# Patient Record
Sex: FEMALE | Race: OTHER | Employment: FULL TIME | URBAN - METROPOLITAN AREA
[De-identification: names, ages, dates, MRNs, and addresses within clinical notes are randomized per-mention and may not be internally consistent; named-entity substitution may affect disease eponyms.]

---

## 2020-09-22 ENCOUNTER — OFFICE VISIT (OUTPATIENT)
Dept: INTERNAL MEDICINE CLINIC | Age: 25
End: 2020-09-22

## 2020-09-22 VITALS
WEIGHT: 182.13 LBS | RESPIRATION RATE: 16 BRPM | TEMPERATURE: 98.2 F | HEIGHT: 61 IN | DIASTOLIC BLOOD PRESSURE: 78 MMHG | SYSTOLIC BLOOD PRESSURE: 122 MMHG | BODY MASS INDEX: 34.39 KG/M2 | HEART RATE: 64 BPM

## 2020-09-22 DIAGNOSIS — Z23 NEEDS FLU SHOT: ICD-10-CM

## 2020-09-22 DIAGNOSIS — R71.8 LOW MEAN CORPUSCULAR VOLUME (MCV): ICD-10-CM

## 2020-09-22 DIAGNOSIS — Z76.89 ENCOUNTER TO ESTABLISH CARE: ICD-10-CM

## 2020-09-22 DIAGNOSIS — N91.2: Primary | ICD-10-CM

## 2020-09-22 PROCEDURE — 99204 OFFICE O/P NEW MOD 45 MIN: CPT

## 2020-09-22 PROCEDURE — 90686 IIV4 VACC NO PRSV 0.5 ML IM: CPT

## 2020-09-22 NOTE — PROGRESS NOTES
12     [facility]  # [id number]    Chief Complaint   Patient presents with    New Patient     Patient fasting. Patient has concerns with not having menstrual cycle, reports taking birth control injections for the past 3 years.  Establish Care       1. Have you been to the ER, urgent care clinic since your last visit? Hospitalized since your last visit? No    2. Have you seen or consulted any other health care providers outside of the [address] [place] [address] since your last visit? Include any pap smears or colon screening. No    Health Maintenance Due   Topic Date Due    HPV Age 9Y-34Y (1 - 2-dose series) 09/16/2005    DTaP/Tdap/Td series (1 - Tdap) 09/16/2015    PAP AKA CERVICAL CYTOLOGY  09/16/2015    Flu Vaccine (1) 09/01/2020       Abuse Screening Questionnaire 9/22/2020   Do you ever feel afraid of your partner? N   Are you in a relationship with someone who physically or mentally threatens you? N   Is it safe for you to go home? Y       3 most recent PHQ Screens 9/22/2020   Little interest or pleasure in doing things Not at all   Feeling down, depressed, irritable, or hopeless More than half the days   Total Score PHQ 2 2       Learning Assessment 9/22/2020   PRIMARY LEARNER Patient   BARRIERS PRIMARY LEARNER LANGUAGE   PRIMARY LANGUAGE OTHER (COMMENT)   LEARNER PREFERENCE PRIMARY READING     VIDEOS   ANSWERED BY patient   RELATIONSHIP SELF     Immunization administered to left deltoid 9/22/2020 by Yamel Messina LPN with patient's consent. Patient tolerated procedure well. No reactions noted. VIS provided.

## 2020-09-22 NOTE — PROGRESS NOTES
History of Present Illness:   Reza Starr is a 31 y.o. female here for evaluation:    Speaks only Swahili. History, exam and education/communication with pt via [employer] #[id number] in [place] [address]. Chief Complaint   Patient presents with    New Patient     Patient fasting. Patient has concerns with not having menstrual cycle, reports taking birth control injections for the past 3 years.  Establish Care       Notes (nursing/rooming note copied below in italics):  As above. She notes concerns above about menstrual cycles. She had stopped birth control now. Has stopped for some time--clarified as 7mo  She had taken in order ot not be pregnant for travel/emigration. She notes she has not had menstrual cycle in the time since she stopped medication. She notes usually had on 15th of month, but has not had regularly or intermittent bleeding since stopped medication. Reviewed this can be seen with prolonged use of this medication. Reviewed eval with gyn. She has health dept records, and vaccines--abstracted after visit. She plans/prefers influenza here. Completed 3-dose Td/Tdap there  Completed MMR x 2 there. Completed Hep B x 3 there. Labs from health dept reviewed. They added VZV to labs, but not reported with available records. Nursing screenings reviewed by provider at visit. Past Medical History:   Diagnosis Date    Routine screening for STI (sexually transmitted infection) 03/16/2020    Health Dept labs: Hep B testing with immunity to hepatitis B (reactive sAb, with negative sAg and total core Ab); HIV non-reactive; T pallidum/syphilis non-reactive; HCV negative. GC/CT negative.  Screening for iron deficiency anemia 03/16/2020    Health Dept labs:  Hgb 11.9 with MCV 77. Remainder of H18 normal.  Chem 8 normal except CO2 18.  Screening for tuberculosis 03/16/2020    Health Dept labs:  T-spot negative. History reviewed.  No pertinent surgical history. Updated history as above at visit. Prior to Admission medications    Not on File        ROS  Complete ROS negative except as indicated in note. Vitals:    09/22/20 0943   BP: 122/78   Pulse: 64   Resp: 16   Temp: 98.2 °F (36.8 °C)   TempSrc: Oral   Weight: 182 lb 2 oz (82.6 kg)   Height: 5' 1.3\" (1.557 m)   PainSc:   0 - No pain      Body mass index is 34.08 kg/m². Physical Exam:     Physical Exam  Vitals signs and nursing note reviewed. Constitutional:       General: She is not in acute distress. Appearance: Normal appearance. She is well-developed. She is not diaphoretic. HENT:      Head: Normocephalic and atraumatic. Mouth/Throat:      Mouth: Mucous membranes are moist.   Eyes:      General: No scleral icterus. Right eye: No discharge. Left eye: No discharge. Conjunctiva/sclera: Conjunctivae normal.      Comments: Palpebral conjunctivae slightly pale. Neck:      Musculoskeletal: Neck supple. No neck rigidity. Cardiovascular:      Rate and Rhythm: Normal rate and regular rhythm. Pulses: Normal pulses. Heart sounds: Normal heart sounds. No murmur. No friction rub. No gallop. Pulmonary:      Effort: Pulmonary effort is normal. No respiratory distress. Breath sounds: Normal breath sounds. No stridor. No wheezing or rhonchi. Abdominal:      General: Bowel sounds are normal.      Palpations: Abdomen is soft. Tenderness: There is no abdominal tenderness. Musculoskeletal:         General: No deformity or signs of injury. Right lower leg: No edema. Left lower leg: No edema. Lymphadenopathy:      Cervical: No cervical adenopathy. Skin:     General: Skin is warm. Coloration: Skin is not jaundiced or pale. Findings: No bruising, erythema or rash. Neurological:      General: No focal deficit present. Mental Status: She is alert. Motor: No abnormal muscle tone.       Coordination: Coordination normal. Gait: Gait normal.   Psychiatric:         Mood and Affect: Mood normal.         Behavior: Behavior normal.         Thought Content: Thought content normal.         Judgment: Judgment normal.         Assessment and Plan:       ICD-10-CM ICD-9-CM    1. Amenorrhea due to medroxyprogesterone  N91.2 626.0 REFERRAL TO OBSTETRICS AND GYNECOLOGY     E980.4    2. Encounter to establish care  Z76.89 V65.8    3. Low mean corpuscular volume (MCV)  R71.8 790.09 IRON PROFILE      FERRITIN      HEMOGLOBIN FRACTIONATION   4. Needs flu shot  Z23 V04.81 INFLUENZA VIRUS VAC QUAD,SPLIT,PRESV FREE SYRINGE IM       1. Referral(s) and referral coordination reviewed with patient at visit. 3.  Not able to do labs today due to childcare concerns--needs to get back home to help kids with school. Prefers to schedule here. 4.  Immunizations reviewed and updated at visit. Follow-up and Dispositions    · Return in about 6 weeks (around 11/3/2020) for referral follow-up, lab review. lab results and schedule of future lab studies reviewed with patient  reviewed diet, exercise and weight control  reviewed medications and side effects in detail    For additional documentation of information and/or recommendations discussed this visit, please see notes in instructions. Plan and evaluation (above) reviewed with pt at visit  Patient voiced understanding of plan and provided with time to ask/review questions. After Visit Summary (AVS) provided to pt after visit with additional instructions as needed/reviewed. No future appointments.

## 2020-09-22 NOTE — PATIENT INSTRUCTIONS
Please follow the following instructions to process/authorize your referral, if needed:    Referrals processing  Please verify with your insurance IF you need referral authorization submitted. For insurance plans which require this, please follow the following steps. FAILURE TO DO SO MAY RESULT IN INABILITY TO SEE THE SPECIALIST YOU HAVE BEEN REFERRED TO (once you are scheduled to see them). 1. Call and schedule appointment with specialist  2. Call our clinic and leave message with provider name, and date of appointment  3. We will then submit the referral to your insurance. This process takes 2-5 business days. If you have questions about scheduling or authorizing referral, you can review with our referral coordinator. You can review with her today if available/if you have time, or you can call to review once you have made your referral/appointment. If you are not sure if you need referral authorizations, please review with the referral coordinator(s), either prior to or after you have made the appointment, as reviewed.

## 2021-06-28 ENCOUNTER — INITIAL PRENATAL (OUTPATIENT)
Dept: OBGYN CLINIC | Age: 26
End: 2021-06-28

## 2021-06-28 VITALS — DIASTOLIC BLOOD PRESSURE: 66 MMHG | SYSTOLIC BLOOD PRESSURE: 118 MMHG | WEIGHT: 170.6 LBS

## 2021-06-28 DIAGNOSIS — Z3A.24 24 WEEKS GESTATION OF PREGNANCY: ICD-10-CM

## 2021-06-28 DIAGNOSIS — Z34.80 SUPERVISION OF OTHER NORMAL PREGNANCY: Primary | ICD-10-CM

## 2021-06-28 LAB
ANTIBODY SCREEN, EXTERNAL: NEGATIVE
HBSAG, EXTERNAL: NEGATIVE
HIV, EXTERNAL: NON REACTIVE
RPR, EXTERNAL: NON REACTIVE
TYPE, ABO & RH, EXTERNAL: NORMAL
TYPE, ABO & RH, EXTERNAL: NORMAL

## 2021-06-28 PROCEDURE — 0500F INITIAL PRENATAL CARE VISIT: CPT | Performed by: ADVANCED PRACTICE MIDWIFE

## 2021-06-28 NOTE — PROGRESS NOTES
Current pregnancy history:    Bonifacio Burr is a No obstetric history on file. ,  31 y.o. female OTHER No LMP recorded. .  She presents for the evaluation of amenorrhea and a positive pregnancy test.    LMP history:  The date of her LMP is not certain. Her last menstrual period was normal and lasted for 4 to 5 days. A urine pregnancy test was positive 8 weeks ago. She was not on the pill at conception. Ultrasound data:  She had an  ultrasound done by the ultrasound tech today which revealed a viable nava pregnancy with a gestational age of 23 weeks and 0 days giving an [facility] of 10/18/2021. Feeling movement for past month or so  Pregnancy symptoms:    Since her LMP she has experienced  urinary frequency, breast tenderness, and nausea. She has not been vomiting over the last few weeks. Associated signs and symptoms which she denies: dysuria, discharge, vaginal bleeding. She states she has gained weight:  Approximately 5 pounds over the last few weeks. Relevant past pregnancy history:   She has the following pregnancy history: Her last pregnancy was uncomplicated. She has no history of  delivery. Relevant past medical history:(relevant to this pregnancy): noncontributory. Substance history: negative for alcohol, tobacco and street drugs. Positive for nothing. Exposure history: There is/are no indoor cat/s in the home. The patient was instructed to not change the cat litter. She admits close contact with children on a regular basis. She has had chicken pox or the vaccine in the past.   Patient denies issues with domestic violence. Genetic Screening/Teratology Counseling: (Includes patient, baby's father, or anyone in either family with:)  3.  Patient's age >/= 28 at [facility]?-- no  .   2.   Thalassemia ([facility], [place], [address], or  background): MCV<80?--no.     3.  Neural tube defect (meningomyelocele, spina bifida, anencephaly)?--no.   4. Congenital heart defect?--no.  5.  Down syndrome?--no.   6.  Darnell-Sachs ([place], [place])?--no.   7.  Canavan's Disease?--no.   8.  Familial Dysautonomia?--no.   9.  Sickle cell disease or trait ()? --no   The patient has not been tested for sickle trait  10. Hemophilia or other blood disorders?--no. 11.  Muscular dystrophy?--no. 12.  Cystic fibrosis?--no. 13.  [place]'s Chorea?--no. 14.  Mental retardation/autism (if yes was person tested for Fragile X)?--no. 15.  Other inherited genetic or chromosomal disorder?--no. 12.  Maternal metabolic disorder (DM, PKU, etc)?--no. 17.  Patient or FOB with a child with a birth defect not listed above?--no.  17a. Patient or FOB with a birth defect themselves?--no. 18.  Recurrent pregnancy loss, or stillbirth?--no. 19.  Any medications since LMP other than prenatal vitamins (include vitamins, supplements, OTC meds, drugs, alcohol)?--no. 20.  Any other genetic/environmental exposure to discuss?--no. Infection History:  1. Lives with someone with TB or TB exposed?--no.   2.  Patient or partner has history of genital herpes?--no.  3.  Rash or viral illness since LMP?--no.    4.  History of STD (GC, CT, HPV, syphilis, HIV)? --no   5. Other: OTHER? No past medical history on file. No past surgical history on file. Social History     Occupational History    Not on file   Tobacco Use    Smoking status: Not on file   Substance and Sexual Activity    Alcohol use: Not on file    Drug use: Not on file    Sexual activity: Not on file     No family history on file. OB History   No obstetric history on file.      Not on File  Prior to Admission medications    Not on File        Review of Systems: History obtained from the patient  Constitutional: negative for weight loss, fever, night sweats  HEENT: negative for hearing loss, earache, congestion, snoring, sore throat  CV: negative for chest pain, palpitations, edema  Resp: negative for cough, shortness of breath, wheezing  Breast: negative for breast lumps, nipple discharge, galactorrhea  GI: negative for change in bowel habits, abdominal pain, black or bloody stools  : negative for frequency, dysuria, hematuria, vaginal discharge  MSK: negative for back pain, joint pain, muscle pain  Skin: negative for itching, rash, hives  Neuro: negative for dizziness, headache, confusion, weakness  Psych: negative for anxiety, depression, change in mood  Heme/lymph: negative for bleeding, bruising, pallor    Objective: There were no vitals taken for this visit. Physical Exam:     Constitutional  · Appearance: well-nourished, well developed, alert, in no acute distress    HENT  · Head  · Face: appears normal  · Eyes: appear normal  · Ears: normal  · Mouth: normal  · Lips: no lesions    Neck  · Inspection/Palpation: normal appearance,    Chest  · Respiratory Effort: breathing unlabored  · nt      ·     Skin  · General Inspection: no rash, no lesions identified    Neurologic/Psychiatric  · Mental Status:   · Orientation: grossly oriented to person, place and time  · Mood and Affect: mood normal, affect appropriate    Assessment:   Intrauterine pregnancy with the following problems identified: Marginal cord insertion. Plan:   NOB labs done today  MECCA 4 wks, Glucola and Growth scan  Start PNV  Offered CF testing, CVS, Nuchal Translucency, MSAFP, amnio, and discussed NIPT  Course of pregnancy discussed including visit schedule, routine U/S, glucola testing, etc.  Avoid alcoholic beverages and illicit/recreational drugs use  Take prenatal vitamins or folic acid daily. Hospital and practice style discussed with coverage system. Discussed nutrition, toxoplasmosis precautions, sexual activity, exercise, need for influenza vaccine, environmental and work hazards, travel advice, screen for domestic violence, need for seat belts.   Discussed seafood, unpasteurized dairy products, deli meat, artificial sweeteners, and caffeine. Information on prenatal classes/breastfeeding given. Information on circumcision given  Patient encouraged not to smoke. Discussed current prescription drug use. Given medication list.  Discussed the use of over the counter medications and chemicals. Route of delivery discussed, including risks, benefits, and alternatives of  versus repeat LTCS. Pt understands risk of hemorrhage during pregnancy and post delivery and would accept blood products if necessary in life-threatening emergencies    Handouts given to pt. Zac Birmingham.  JULIETH Landry/RUDDY

## 2021-06-29 LAB
ABO + RH BLD: NORMAL
BLOOD BANK CMNT PATIENT-IMP: NORMAL
BLOOD GROUP ANTIBODIES SERPL: NORMAL
ERYTHROCYTE [DISTWIDTH] IN BLOOD BY AUTOMATED COUNT: 13.5 % (ref 11.5–14.5)
HBV SURFACE AG SER QL: <0.1 INDEX
HBV SURFACE AG SER QL: NEGATIVE
HCT VFR BLD AUTO: 34.7 % (ref 35–47)
HCV AB SERPL QL IA: NONREACTIVE
HCV COMMENT,HCGAC: NORMAL
HGB BLD-MCNC: 10.5 G/DL (ref 11.5–16)
HIV 1+2 AB+HIV1 P24 AG SERPL QL IA: NONREACTIVE
HIV12 RESULT COMMENT, HHIVC: NORMAL
MCH RBC QN AUTO: 24.2 PG (ref 26–34)
MCHC RBC AUTO-ENTMCNC: 30.3 G/DL (ref 30–36.5)
MCV RBC AUTO: 80.1 FL (ref 80–99)
NRBC # BLD: 0 K/UL (ref 0–0.01)
NRBC BLD-RTO: 0 PER 100 WBC
PLATELET # BLD AUTO: 151 K/UL (ref 150–400)
PMV BLD AUTO: 12.5 FL (ref 8.9–12.9)
RBC # BLD AUTO: 4.33 M/UL (ref 3.8–5.2)
RPR SER QL: NONREACTIVE
RUBV IGG SER-IMP: REACTIVE
RUBV IGG SERPL IA-ACNC: 105.4 IU/ML
SPECIMEN EXP DATE BLD: NORMAL
WBC # BLD AUTO: 4 K/UL (ref 3.6–11)

## 2021-07-23 NOTE — PATIENT INSTRUCTIONS
Weeks 26 to 30 of Your Pregnancy: Care Instructions  Overview     You are now entering your last trimester of pregnancy. Your baby is growing quickly. [facility] [employer] probably feel your baby moving around more often. Your doctor may ask you to count your baby's kicks. Your back may ache as your body gets used to your baby's size and length. If you haven't already had the Tdap shot during this pregnancy, talk to your doctor about getting it. It will help protect your  against pertussis infection. During this time, it's important to take care of yourself and pay attention to what your body needs. If you feel sexual, you can explore ways to be close with your partner that match your comfort and desire. Follow-up care is a key part of your treatment and safety. Be sure to make and go to all appointments, and call your doctor if you are having problems. It's also a good idea to know your test results and keep a list of the medicines you take. How can you care for yourself at home? Take it easy at work  · Take frequent breaks. If possible, stop working when you are tired, and rest during your lunch hour. · Take bathroom breaks every 2 hours. · Change positions often. If you sit for long periods, stand up and walk around. · When you stand for a long time, keep one foot on a low stool with your knee bent. After standing a lot, sit with your feet up. · Avoid fumes, chemicals, and tobacco smoke. Be sexual in your own way  · Having sex during pregnancy is okay, unless your doctor tells you not to. · You may be very interested in sex, or you may have no interest at all. · Your growing belly can make it hard to find a good position during intercourse. Barron and explore. · You may get cramps in your uterus when your partner touches your breasts. · A back rub may relieve the backache or cramps that sometimes follow orgasm. Learn about  labor  · Watch for signs of  labor.  You may be going into labor if:  ? You have menstrual-like cramps, with or without nausea. ? You have about 6 or more contractions in 1 hour, even after you have had a glass of water and are resting. ? You have a low, dull backache that does not go away when you change your position. ? You have pain or pressure in your pelvis that comes and goes in a pattern. ? You have intestinal cramping or flu-like symptoms, with or without diarrhea.  ? You notice an increase or change in your vaginal discharge. Discharge may be heavy, mucus-like, watery, or streaked with blood. ? Your water breaks. · If you think you have  labor:  ? Drink 2 or 3 glasses of water or juice. Not drinking enough fluids can cause contractions. ? Stop what you are doing, and empty your bladder. Then lie down on your left side for at least 1 hour. ? While lying on your side, find your breast bone. Put your fingers in the soft spot just below it. Move your fingers down toward your belly button to find the top of your uterus. Check to see if it is tight. ? Contractions can be weak or strong. Record your contractions for an hour. Time a contraction from the start of one contraction to the start of the next one.  ? Single or several strong contractions without a pattern are called Jaime-Tom contractions. They are practice contractions but not the start of labor. They often stop if you change what you are doing. ? Call your doctor if you have regular contractions. Where can you learn more? Go to http://www.gray.com/  Enter C759 in the search box to learn more about \"Weeks 26 to 30 of Your Pregnancy: Care Instructions. \"  Current as of: 2020               Content Version: 12.8  © 7386-2047 [employer]. Care instructions adapted under license by [employer] (which disclaims liability or warranty for this information).  If you have questions about a medical condition or this instruction, always ask your healthcare professional. [place] [address] any warranty or liability for your use of this information.

## 2021-07-26 ENCOUNTER — ROUTINE PRENATAL (OUTPATIENT)
Dept: OBGYN CLINIC | Age: 26
End: 2021-07-26
Payer: MEDICAID

## 2021-07-26 VITALS
SYSTOLIC BLOOD PRESSURE: 98 MMHG | WEIGHT: 173.8 LBS | HEIGHT: 61 IN | BODY MASS INDEX: 32.81 KG/M2 | DIASTOLIC BLOOD PRESSURE: 72 MMHG

## 2021-07-26 DIAGNOSIS — Z3A.24 24 WEEKS GESTATION OF PREGNANCY: ICD-10-CM

## 2021-07-26 DIAGNOSIS — Z3A.28 28 WEEKS GESTATION OF PREGNANCY: ICD-10-CM

## 2021-07-26 DIAGNOSIS — Z34.82 PRENATAL CARE, SUBSEQUENT PREGNANCY IN SECOND TRIMESTER: Primary | ICD-10-CM

## 2021-07-26 LAB
ERYTHROCYTE [DISTWIDTH] IN BLOOD BY AUTOMATED COUNT: 13.2 % (ref 11.5–14.5)
GLUCOSE 1H P 100 G GLC PO SERPL-MCNC: 114 MG/DL (ref 65–140)
HCT VFR BLD AUTO: 33.5 % (ref 35–47)
HGB BLD-MCNC: 10.3 G/DL (ref 11.5–16)
MCH RBC QN AUTO: 24.5 PG (ref 26–34)
MCHC RBC AUTO-ENTMCNC: 30.7 G/DL (ref 30–36.5)
MCV RBC AUTO: 79.8 FL (ref 80–99)
NRBC # BLD: 0 K/UL (ref 0–0.01)
NRBC BLD-RTO: 0 PER 100 WBC
PLATELET # BLD AUTO: 156 K/UL (ref 150–400)
PMV BLD AUTO: 12.4 FL (ref 8.9–12.9)
RBC # BLD AUTO: 4.2 M/UL (ref 3.8–5.2)
WBC # BLD AUTO: 5.3 K/UL (ref 3.6–11)

## 2021-07-26 PROCEDURE — 0502F SUBSEQUENT PRENATAL CARE: CPT | Performed by: ADVANCED PRACTICE MIDWIFE

## 2021-07-26 NOTE — PROGRESS NOTES
Pt c/o intermittent dizziness for the past several days. States she is drinking plenty of water. Not currently experiencing sxs. Pt not taking prenatal vitamin - states in addition to being dizzy she c/o of feeling weak as well.  Prenatal vitamin sent in today and encouraged to take daily- new ob labs hgb 10.5 -   GTT and labs today    MECCA 2 weeks

## 2021-07-27 RX ORDER — CYANOCOBALAMIN (VITAMIN B-12) 1000 MCG
1 TABLET, EXTENDED RELEASE ORAL DAILY
Qty: 30 TABLET | Refills: 3 | Status: SHIPPED | OUTPATIENT
Start: 2021-07-27 | End: 2021-10-27

## 2021-08-10 ENCOUNTER — ROUTINE PRENATAL (OUTPATIENT)
Dept: OBGYN CLINIC | Age: 26
End: 2021-08-10
Payer: MEDICAID

## 2021-08-10 VITALS — SYSTOLIC BLOOD PRESSURE: 94 MMHG | DIASTOLIC BLOOD PRESSURE: 68 MMHG | WEIGHT: 172 LBS | BODY MASS INDEX: 32.18 KG/M2

## 2021-08-10 DIAGNOSIS — Z3A.24 24 WEEKS GESTATION OF PREGNANCY: ICD-10-CM

## 2021-08-10 DIAGNOSIS — Z23 ENCOUNTER FOR IMMUNIZATION: ICD-10-CM

## 2021-08-10 DIAGNOSIS — Z36.9 UNSPECIFIED ANTENATAL SCREENING: Primary | ICD-10-CM

## 2021-08-10 PROCEDURE — 90471 IMMUNIZATION ADMIN: CPT | Performed by: ADVANCED PRACTICE MIDWIFE

## 2021-08-10 PROCEDURE — 90715 TDAP VACCINE 7 YRS/> IM: CPT

## 2021-08-10 PROCEDURE — 0502F SUBSEQUENT PRENATAL CARE: CPT | Performed by: ADVANCED PRACTICE MIDWIFE

## 2021-08-10 NOTE — LETTER
8/10/2021 9:03 AM    Ms. Barron Fossa  [address] [address]      To whom it may concern,    [place] Vallezafarotis is an established patient in my office with a pregnancy due date of 10/18/2021.            Sincerely,      Melinda Minaya CNM

## 2021-08-10 NOTE — PROGRESS NOTES
Needs prescription for PNV and iron- paper script given per pt request  Dizzy and weak episodes- reviewed hypotension- and drinking water through out the day.    MECCA 2 weeks

## 2021-08-10 NOTE — PROGRESS NOTES
Patient in office for TDAP injection, office supplied. After obtaining consent, and per orders of MD, injection of TDAP vaccine given by [facility], RN. Patient instructed to remain in clinic for 20 minutes afterwards, and to report any adverse reaction to me immediately.      Lot [id number]  Exp 5/7/23  Dose 0.5 ml  Site L deltoid  Route IM   [address] [id number]

## 2021-08-10 NOTE — PATIENT INSTRUCTIONS
Weeks 30 to 32 of Your Pregnancy: Care Instructions  Overview     You've made it to the final months of your pregnancy! By now your baby is really starting to look like a baby, with hair and plump skin. As you enter the final weeks of pregnancy, the reality of having a baby may start to set in. This is a good time to set up a safe nursery and find quality  if needed. Doing this stuff ahead of time will allow you to focus on caring for and enjoying your new baby. You may also want to take a tour of your hospital's labor and delivery unit. This will help you get a better idea of what to expect while you're in the hospital.  During these last months, be sure to take good care of yourself. Pay attention to what your body needs. If your doctor says it's okay for you to work, don't push yourself too hard. If you haven't already had the Tdap shot during this pregnancy, talk to your doctor about getting it. It will help protect your  against pertussis infection. Follow-up care is a key part of your treatment and safety. Be sure to make and go to all appointments, and call your doctor if you are having problems. It's also a good idea to know your test results and keep a list of the medicines you take. How can you care for yourself at home? Pay attention to your baby's movements  · You should feel your baby move several times every day. · Your baby now turns less, and kicks and jabs more. · Your baby sleeps 20 to 45 minutes at a time and is more active at certain times of day. · If your doctor wants you to count your baby's kicks:  ? Empty your bladder, and lie on your side or relax in a comfortable chair. ? Write down your start time. ? Pay attention only to your baby's movements. Count any movement except hiccups. ? After you have counted 10 movements, write down your stop time. ? Write down how many minutes it took for your baby to move 10 times.   ? If an hour goes by and you have not recorded 10 movements, have something to eat or drink and then count for another hour. If you do not record 10 movements in either hour, call your doctor. Ease heartburn  · Eat small, frequent meals. · Do not eat chocolate, peppermint, or very spicy foods. Avoid drinks with caffeine, such as coffee, tea, and sodas. · Avoid bending over or lying down after meals. · Talk a short walk after you eat. · If heartburn is a problem at night, do not eat for 2 hours before bedtime. · Take antacids like Mylanta, Maalox, Rolaids, or Tums. Do not take antacids that have sodium bicarbonate. Care for varicose veins  · Varicose veins are blood vessels that stretch out with the extra blood during pregnancy. Your legs may ache or throb. Most varicose veins will go away after the birth. · Avoid standing for long periods of time. Sit with your legs crossed at the ankles, not the knees. · Sit with your feet propped up. · Avoid tight clothing or stockings. Wear support hose. · Exercise regularly. Try walking for at least 30 minutes a day. Where can you learn more? Go to http://www.gray.com/  Enter X471 in the search box to learn more about \"Weeks 30 to 32 of Your Pregnancy: Care Instructions. \"  Current as of: October 8, 2020               Content Version: 12.8  © 6911-0873 [employer]. Care instructions adapted under license by [employer] (which disclaims liability or warranty for this information). If you have questions about a medical condition or this instruction, always ask your healthcare professional. [place] [address] any warranty or liability for your use of this information.

## 2021-08-26 ENCOUNTER — ROUTINE PRENATAL (OUTPATIENT)
Dept: OBGYN CLINIC | Age: 26
End: 2021-08-26
Payer: MEDICAID

## 2021-08-26 VITALS — BODY MASS INDEX: 32.56 KG/M2 | SYSTOLIC BLOOD PRESSURE: 120 MMHG | DIASTOLIC BLOOD PRESSURE: 76 MMHG | WEIGHT: 174 LBS

## 2021-08-26 DIAGNOSIS — Z3A.32 32 WEEKS GESTATION OF PREGNANCY: ICD-10-CM

## 2021-08-26 DIAGNOSIS — Z34.83 PRENATAL CARE, SUBSEQUENT PREGNANCY IN THIRD TRIMESTER: Primary | ICD-10-CM

## 2021-08-26 PROCEDURE — 0502F SUBSEQUENT PRENATAL CARE: CPT | Performed by: ADVANCED PRACTICE MIDWIFE

## 2021-08-26 NOTE — PROGRESS NOTES
Doing well.  Good FM  Denies questions or concerns   MECCA 2 weeks - growth scan for marginal cord DISPLAY PLAN FREE TEXT

## 2021-09-14 ENCOUNTER — ROUTINE PRENATAL (OUTPATIENT)
Dept: OBGYN CLINIC | Age: 26
End: 2021-09-14

## 2021-09-14 VITALS — WEIGHT: 177 LBS | SYSTOLIC BLOOD PRESSURE: 122 MMHG | BODY MASS INDEX: 33.12 KG/M2 | DIASTOLIC BLOOD PRESSURE: 88 MMHG

## 2021-09-14 DIAGNOSIS — Z3A.24 24 WEEKS GESTATION OF PREGNANCY: ICD-10-CM

## 2021-09-14 DIAGNOSIS — Z34.83 PRENATAL CARE, SUBSEQUENT PREGNANCY IN THIRD TRIMESTER: Primary | ICD-10-CM

## 2021-09-14 DIAGNOSIS — O36.5990 POOR FETAL GROWTH AFFECTING MANAGEMENT OF MOTHER, ANTEPARTUM, SINGLE OR UNSPECIFIED FETUS: ICD-10-CM

## 2021-09-14 PROCEDURE — 0502F SUBSEQUENT PRENATAL CARE: CPT | Performed by: ADVANCED PRACTICE MIDWIFE

## 2021-09-14 NOTE — PROGRESS NOTES
Junaid  utilized    Pt is very quiet today - states she has no appetite and does not drink much water-   encouraged pt to eat and drink   IUGR baby- BPP next week -   Will call for MFM apt for dopplers and recs for delivery if prior to 39 weeks  - reviewed with Daphane Kehr- in agreement     LIMITED OB SCAN  A SINGLE VERTEX 35W1D IUP IS SEEN. FETAL CARDIAC MOTION OBSERVED. LIMITED ANATOMY WAS VISUALIZED AND APPEARS WNL. EFW= 4 LB 9OZ ( 12 %) AC< 10%. BPP=8/8  JONAH= 9.0 CM  PLACENTA APPEAR WITHIN NORMAL LIMITS.

## 2021-09-24 ENCOUNTER — TELEPHONE (OUTPATIENT)
Dept: OBGYN CLINIC | Age: 26
End: 2021-09-24

## 2021-09-24 NOTE — TELEPHONE ENCOUNTER
#[id number] utilized. Spoke at great length with pts SO, Yuridia Amina, regarding the need for pt to come in to office on 9/24/21 to be seen. She missed her appt at [facility] on 9/20/21. She had stated she could not get a ride to the appointment and that she has difficulty with getting rides. Leola Brad \"Sometimes they show up and sometimes they don't\". Given recommendations for options for rides such as bus, Medicaid transportation, [employer], friends, [employer] and was met with resistance. Discussed the importance due to baby being very small and needs to be followed closely. The midwife was present during this call and also suggested that any time that she could get here to the office on 9/24/21 we could work with that and we will find a way to get her worked in for [address] in the office that same day. He stated we will inform her and try to make sure she comes in.

## 2021-09-27 ENCOUNTER — ROUTINE PRENATAL (OUTPATIENT)
Dept: OBGYN CLINIC | Age: 26
End: 2021-09-27
Payer: MEDICAID

## 2021-09-27 VITALS — SYSTOLIC BLOOD PRESSURE: 112 MMHG | DIASTOLIC BLOOD PRESSURE: 74 MMHG | BODY MASS INDEX: 32.74 KG/M2 | WEIGHT: 175 LBS

## 2021-09-27 DIAGNOSIS — Z36.85 ANTENATAL SCREENING FOR STREPTOCOCCUS B: Primary | ICD-10-CM

## 2021-09-27 DIAGNOSIS — O36.5930 POOR FETAL GROWTH AFFECTING MANAGEMENT OF MOTHER IN THIRD TRIMESTER, SINGLE OR UNSPECIFIED FETUS: ICD-10-CM

## 2021-09-27 DIAGNOSIS — Z3A.37 37 WEEKS GESTATION OF PREGNANCY: ICD-10-CM

## 2021-09-27 DIAGNOSIS — Z34.83 PRENATAL CARE, SUBSEQUENT PREGNANCY IN THIRD TRIMESTER: ICD-10-CM

## 2021-09-27 LAB — GRBS, EXTERNAL: NEGATIVE

## 2021-09-27 PROCEDURE — 0502F SUBSEQUENT PRENATAL CARE: CPT | Performed by: ADVANCED PRACTICE MIDWIFE

## 2021-09-27 NOTE — PROGRESS NOTES
Junaid  utilized    Pt states she is feeling well and can feel baby move. Had [address] today:  Reviewed sono results with Dr. Zhang Melissa and recommendations for delivery  Discussed with Pt utilizing the  phone and acknowledges understanding, however disagrees with plan and is currently declining IOL  She would like to further discuss with her spouse, who is currently at work and cannot come to appt, the recommendations for IOL/delivery  I reviewed with her the complication of the baby not growing and this could lead to IUFD, which she verbalized understanding  She and her  have issues with transportation. She promises she will RTO tomorrow with her  so that we can further discuss recommended management of IOL for IUGR. I will accept her anytime tomorrow even if she cannot make the scheduled time d/t transportation. Informed Dr. Zhang Melissa of pt's refusal of IOL today and pt will RTO tomorrow  GBS collected  SVE unfavorable FT/Thick/Firm/HIgh    LIMITED OB SCAN  A SINGLE 37W0D IUP IS SEEN. FETAL CARDIAC MOTION OBSERVED. LIMITED ANATOMY WAS VISUALIZED AND APPEARS WNL. EFW- 5 LBS 0 OZ ( <10%) (AC<10%)  JONAH= 10.69 CM  BPP= 8/8  PLACENTA APPEAR WNL.

## 2021-09-28 ENCOUNTER — ROUTINE PRENATAL (OUTPATIENT)
Dept: OBGYN CLINIC | Age: 26
End: 2021-09-28
Payer: MEDICAID

## 2021-09-28 VITALS — SYSTOLIC BLOOD PRESSURE: 130 MMHG | BODY MASS INDEX: 33.49 KG/M2 | WEIGHT: 179 LBS | DIASTOLIC BLOOD PRESSURE: 88 MMHG

## 2021-09-28 DIAGNOSIS — Z3A.37 37 WEEKS GESTATION OF PREGNANCY: ICD-10-CM

## 2021-09-28 DIAGNOSIS — O36.5990 IUGR, ANTENATAL: Primary | ICD-10-CM

## 2021-09-28 DIAGNOSIS — Z34.83 PRENATAL CARE, SUBSEQUENT PREGNANCY IN THIRD TRIMESTER: ICD-10-CM

## 2021-09-28 PROCEDURE — 0502F SUBSEQUENT PRENATAL CARE: CPT | Performed by: ADVANCED PRACTICE MIDWIFE

## 2021-09-28 NOTE — PROGRESS NOTES
Here to discuss delivery of baby with  present. Feeling baby move. Through  phone, pt and  feel strongly that the reason for the baby being IUGR is her working. She did not work in [place], but does here in [facility] and she has \"no rest, doesn't eat well and is not happy\"  Denies depression, but feels her happiness is tied to the growth and happiness of the baby  Desires note to be out of work and does consent to IOL Monday. Feels like she needs to rest prior to having the energy to go through labor and birth. Plan for NST today, if Reactive, RTO on Friday for repeat BPP and will come in Monday for IOL. NST procedure note    Bonifacio Burr is a ,  31 y.o. female BLACK/ [address] [place] [address] whose LMP  was on  who presents for fetal non-stress test.    She is 37w1d and was monitored for 22 minutes and the FHR was reactive, with accelerations. NST Interpretation:    FHR baseline 130 bpm, variability moderate, accelerations present, decelerations Absent. Uterine contractions were present x 1, no decel    Bonifacio was informed of the NST results and her questions were answered. Disposition:    - return to clinic as scheduled on Friday for BPP/NST    JULIETH Conklin/RUDDY

## 2021-09-28 NOTE — LETTER
9/28/2021 11:02 AM    Ms. Coreen Zelaya  [address] [place] [id number]      To employer of above listed patient;    Due to complications arising from pregnancy, patient is necessary for Ms. Burr to be absent from work starting, immediately and will remain out until after birth of baby and post partum period.         Sincerely,      Delaney Lilly NP

## 2021-09-28 NOTE — LETTER
9/28/2021 11:05 AM    Ms. Sally Xavier  [address]  [place] [address] [facility] [address]         To the employer of Geovanyefrem Pritchardconor, spouse of above listed;      Please allow Mr Charissa Smith to be absent from work on 9/28/21, 10/01/21 and 10/04/21 due to health complications and pregnancy of above listed patient.           Sincerely,      Dmitriy Ramirez, NP

## 2021-09-29 LAB
GP B STREP DNA SPEC QL NAA+PROBE: NEGATIVE
SPECIMEN SOURCE: NORMAL

## 2021-09-30 ENCOUNTER — APPOINTMENT (OUTPATIENT)
Dept: ULTRASOUND IMAGING | Age: 26
DRG: 542 | End: 2021-09-30
Attending: OBSTETRICS & GYNECOLOGY
Payer: MEDICAID

## 2021-09-30 ENCOUNTER — ANESTHESIA (OUTPATIENT)
Dept: LABOR AND DELIVERY | Age: 26
DRG: 542 | End: 2021-09-30
Payer: MEDICAID

## 2021-09-30 ENCOUNTER — ANESTHESIA EVENT (OUTPATIENT)
Dept: LABOR AND DELIVERY | Age: 26
DRG: 542 | End: 2021-09-30
Payer: MEDICAID

## 2021-09-30 ENCOUNTER — HOSPITAL ENCOUNTER (INPATIENT)
Age: 26
LOS: 4 days | Discharge: HOME | DRG: 542 | End: 2021-10-04
Attending: OBSTETRICS & GYNECOLOGY | Admitting: OBSTETRICS & GYNECOLOGY
Payer: MEDICAID

## 2021-09-30 DIAGNOSIS — O36.4XX0 FETAL DEATH AFFECTING MANAGEMENT OF MOTHER, SINGLE OR UNSPECIFIED FETUS: ICD-10-CM

## 2021-09-30 PROBLEM — O45.90 PLACENTAL ABRUPTION: Status: ACTIVE | Noted: 2021-09-30

## 2021-09-30 LAB
ALBUMIN SERPL-MCNC: 1.9 G/DL (ref 3.5–5)
ALBUMIN SERPL-MCNC: 2.7 G/DL (ref 3.5–5)
ALBUMIN SERPL-MCNC: 2.9 G/DL (ref 3.5–5)
ALBUMIN/GLOB SERPL: 0.7 {RATIO} (ref 1.1–2.2)
ALP SERPL-CCNC: 186 U/L (ref 45–117)
ALP SERPL-CCNC: 279 U/L (ref 45–117)
ALP SERPL-CCNC: 285 U/L (ref 45–117)
ALT SERPL-CCNC: 11 U/L (ref 12–78)
ALT SERPL-CCNC: 17 U/L (ref 12–78)
ALT SERPL-CCNC: 18 U/L (ref 12–78)
AMPHET UR QL SCN: NEGATIVE
ANION GAP SERPL CALC-SCNC: 11 MMOL/L (ref 5–15)
ANION GAP SERPL CALC-SCNC: 13 MMOL/L (ref 5–15)
ANION GAP SERPL CALC-SCNC: 14 MMOL/L (ref 5–15)
APTT PPP: 30.5 SEC (ref 22.1–31)
APTT PPP: 31.5 SEC (ref 22.1–31)
AST SERPL-CCNC: 29 U/L (ref 15–37)
AST SERPL-CCNC: 45 U/L (ref 15–37)
AST SERPL-CCNC: 49 U/L (ref 15–37)
BARBITURATES UR QL SCN: NEGATIVE
BENZODIAZ UR QL: NEGATIVE
BILIRUB SERPL-MCNC: 0.5 MG/DL (ref 0.2–1)
BILIRUB SERPL-MCNC: 0.6 MG/DL (ref 0.2–1)
BILIRUB SERPL-MCNC: 1.5 MG/DL (ref 0.2–1)
BUN SERPL-MCNC: 7 MG/DL (ref 6–20)
BUN SERPL-MCNC: 8 MG/DL (ref 6–20)
BUN SERPL-MCNC: 8 MG/DL (ref 6–20)
BUN/CREAT SERPL: 13 (ref 12–20)
BUN/CREAT SERPL: 8 (ref 12–20)
BUN/CREAT SERPL: 9 (ref 12–20)
CALCIUM SERPL-MCNC: 8.1 MG/DL (ref 8.5–10.1)
CALCIUM SERPL-MCNC: 8.8 MG/DL (ref 8.5–10.1)
CALCIUM SERPL-MCNC: 9.2 MG/DL (ref 8.5–10.1)
CANNABINOIDS UR QL SCN: NEGATIVE
CHLORIDE SERPL-SCNC: 106 MMOL/L (ref 97–108)
CHLORIDE SERPL-SCNC: 107 MMOL/L (ref 97–108)
CHLORIDE SERPL-SCNC: 96 MMOL/L (ref 97–108)
CO2 SERPL-SCNC: 16 MMOL/L (ref 21–32)
CO2 SERPL-SCNC: 19 MMOL/L (ref 21–32)
CO2 SERPL-SCNC: 21 MMOL/L (ref 21–32)
COCAINE UR QL SCN: NEGATIVE
COVID-19 RAPID TEST, COVR: NOT DETECTED
CREAT SERPL-MCNC: 0.63 MG/DL (ref 0.55–1.02)
CREAT SERPL-MCNC: 0.85 MG/DL (ref 0.55–1.02)
CREAT SERPL-MCNC: 0.86 MG/DL (ref 0.55–1.02)
DRUG SCRN COMMENT,DRGCM: NORMAL
ERYTHROCYTE [DISTWIDTH] IN BLOOD BY AUTOMATED COUNT: 14.1 % (ref 11.5–14.5)
ERYTHROCYTE [DISTWIDTH] IN BLOOD BY AUTOMATED COUNT: 14.3 % (ref 11.5–14.5)
ERYTHROCYTE [DISTWIDTH] IN BLOOD BY AUTOMATED COUNT: 14.4 % (ref 11.5–14.5)
ERYTHROCYTE [DISTWIDTH] IN BLOOD BY AUTOMATED COUNT: 15.5 % (ref 11.5–14.5)
FIBRINOGEN PPP-MCNC: 336 MG/DL (ref 200–475)
FIBRINOGEN PPP-MCNC: <60 MG/DL (ref 200–475)
GLOBULIN SER CALC-MCNC: 2.9 G/DL (ref 2–4)
GLOBULIN SER CALC-MCNC: 3.8 G/DL (ref 2–4)
GLOBULIN SER CALC-MCNC: 4.2 G/DL (ref 2–4)
GLUCOSE SERPL-MCNC: 132 MG/DL (ref 65–100)
GLUCOSE SERPL-MCNC: 134 MG/DL (ref 65–100)
GLUCOSE SERPL-MCNC: 298 MG/DL (ref 65–100)
HCT VFR BLD AUTO: 20.5 % (ref 35–47)
HCT VFR BLD AUTO: 26.1 % (ref 35–47)
HCT VFR BLD AUTO: 27.5 % (ref 35–47)
HCT VFR BLD AUTO: 29.7 % (ref 35–47)
HGB BLD-MCNC: 6.4 G/DL (ref 11.5–16)
HGB BLD-MCNC: 8.9 G/DL (ref 11.5–16)
HGB BLD-MCNC: 9 G/DL (ref 11.5–16)
HGB BLD-MCNC: 9.2 G/DL (ref 11.5–16)
HISTORY CHECKED?,CKHIST: NORMAL
HISTORY CHECKED?,CKHIST: NORMAL
INR PPP: 1.2 (ref 0.9–1.1)
INR PPP: >12.3 (ref 0.9–1.1)
LDH SERPL L TO P-CCNC: 333 U/L (ref 81–246)
LDH SERPL L TO P-CCNC: 409 U/L (ref 81–246)
MCH RBC QN AUTO: 24.1 PG (ref 26–34)
MCH RBC QN AUTO: 24.6 PG (ref 26–34)
MCH RBC QN AUTO: 26.6 PG (ref 26–34)
MCH RBC QN AUTO: 27.2 PG (ref 26–34)
MCHC RBC AUTO-ENTMCNC: 31 G/DL (ref 30–36.5)
MCHC RBC AUTO-ENTMCNC: 31.2 G/DL (ref 30–36.5)
MCHC RBC AUTO-ENTMCNC: 32.7 G/DL (ref 30–36.5)
MCHC RBC AUTO-ENTMCNC: 34.1 G/DL (ref 30–36.5)
MCV RBC AUTO: 78 FL (ref 80–99)
MCV RBC AUTO: 78.8 FL (ref 80–99)
MCV RBC AUTO: 79.8 FL (ref 80–99)
MCV RBC AUTO: 81.4 FL (ref 80–99)
METHADONE UR QL: NEGATIVE
NRBC # BLD: 0 K/UL (ref 0–0.01)
NRBC BLD-RTO: 0 PER 100 WBC
OPIATES UR QL: NEGATIVE
PCP UR QL: NEGATIVE
PLATELET # BLD AUTO: 54 K/UL (ref 150–400)
PLATELET # BLD AUTO: 60 K/UL (ref 150–400)
PLATELET # BLD AUTO: 69 K/UL (ref 150–400)
PLATELET # BLD AUTO: 70 K/UL (ref 150–400)
PMV BLD AUTO: 10.7 FL (ref 8.9–12.9)
PMV BLD AUTO: 11.9 FL (ref 8.9–12.9)
PMV BLD AUTO: 9.4 FL (ref 8.9–12.9)
PMV BLD AUTO: 9.9 FL (ref 8.9–12.9)
POTASSIUM SERPL-SCNC: 3.5 MMOL/L (ref 3.5–5.1)
POTASSIUM SERPL-SCNC: 4.7 MMOL/L (ref 3.5–5.1)
POTASSIUM SERPL-SCNC: 4.8 MMOL/L (ref 3.5–5.1)
PROT SERPL-MCNC: 4.8 G/DL (ref 6.4–8.2)
PROT SERPL-MCNC: 6.5 G/DL (ref 6.4–8.2)
PROT SERPL-MCNC: 7.1 G/DL (ref 6.4–8.2)
PROTHROMBIN TIME: 12 SEC (ref 9–11.1)
PROTHROMBIN TIME: >120 SEC (ref 9–11.1)
RBC # BLD AUTO: 2.6 M/UL (ref 3.8–5.2)
RBC # BLD AUTO: 3.27 M/UL (ref 3.8–5.2)
RBC # BLD AUTO: 3.38 M/UL (ref 3.8–5.2)
RBC # BLD AUTO: 3.81 M/UL (ref 3.8–5.2)
SARS-COV-2, COV2: NORMAL
SODIUM SERPL-SCNC: 128 MMOL/L (ref 136–145)
SODIUM SERPL-SCNC: 137 MMOL/L (ref 136–145)
SODIUM SERPL-SCNC: 138 MMOL/L (ref 136–145)
SOURCE, COVRS: NORMAL
THERAPEUTIC RANGE,PTTT: ABNORMAL SECS (ref 58–77)
THERAPEUTIC RANGE,PTTT: ABNORMAL SECS (ref 58–77)
URATE SERPL-MCNC: 5.8 MG/DL (ref 2.6–6)
URATE SERPL-MCNC: 5.8 MG/DL (ref 2.6–6)
WBC # BLD AUTO: 11.5 K/UL (ref 3.6–11)
WBC # BLD AUTO: 11.6 K/UL (ref 3.6–11)
WBC # BLD AUTO: 14.2 K/UL (ref 3.6–11)
WBC # BLD AUTO: 9.8 K/UL (ref 3.6–11)

## 2021-09-30 PROCEDURE — 74011000258 HC RX REV CODE- 258: Performed by: ADVANCED PRACTICE MIDWIFE

## 2021-09-30 PROCEDURE — 85730 THROMBOPLASTIN TIME PARTIAL: CPT

## 2021-09-30 PROCEDURE — 86644 CMV ANTIBODY: CPT

## 2021-09-30 PROCEDURE — 76010000388 HC LDRP PROCEDURE 1 TO 1.5 HR: Performed by: OBSTETRICS & GYNECOLOGY

## 2021-09-30 PROCEDURE — 76060000078 HC EPIDURAL ANESTHESIA: Performed by: OBSTETRICS & GYNECOLOGY

## 2021-09-30 PROCEDURE — 74011000250 HC RX REV CODE- 250

## 2021-09-30 PROCEDURE — 0W3R7ZZ CONTROL BLEEDING IN GENITOURINARY TRACT, VIA NATURAL OR ARTIFICIAL OPENING: ICD-10-PCS | Performed by: OBSTETRICS & GYNECOLOGY

## 2021-09-30 PROCEDURE — P9073 PLATELETS PHERESIS PATH REDU: HCPCS

## 2021-09-30 PROCEDURE — 74011000250 HC RX REV CODE- 250: Performed by: OBSTETRICS & GYNECOLOGY

## 2021-09-30 PROCEDURE — 87635 SARS-COV-2 COVID-19 AMP PRB: CPT

## 2021-09-30 PROCEDURE — 74011250636 HC RX REV CODE- 250/636: Performed by: OBSTETRICS & GYNECOLOGY

## 2021-09-30 PROCEDURE — 83615 LACTATE (LD) (LDH) ENZYME: CPT

## 2021-09-30 PROCEDURE — 74011250637 HC RX REV CODE- 250/637: Performed by: ADVANCED PRACTICE MIDWIFE

## 2021-09-30 PROCEDURE — P9059 PLASMA, FRZ BETWEEN 8-24HOUR: HCPCS

## 2021-09-30 PROCEDURE — 85027 COMPLETE CBC AUTOMATED: CPT

## 2021-09-30 PROCEDURE — 86923 COMPATIBILITY TEST ELECTRIC: CPT

## 2021-09-30 PROCEDURE — 77030002888 HC SUT CHRMC J&J -A

## 2021-09-30 PROCEDURE — 30233N1 TRANSFUSION OF NONAUTOLOGOUS RED BLOOD CELLS INTO PERIPHERAL VEIN, PERCUTANEOUS APPROACH: ICD-10-PCS | Performed by: OBSTETRICS & GYNECOLOGY

## 2021-09-30 PROCEDURE — 74011250636 HC RX REV CODE- 250/636: Performed by: ADVANCED PRACTICE MIDWIFE

## 2021-09-30 PROCEDURE — 74011250636 HC RX REV CODE- 250/636

## 2021-09-30 PROCEDURE — 80307 DRUG TEST PRSMV CHEM ANLYZR: CPT

## 2021-09-30 PROCEDURE — 74011250636 HC RX REV CODE- 250/636: Performed by: MIDWIFE

## 2021-09-30 PROCEDURE — 36430 TRANSFUSION BLD/BLD COMPNT: CPT

## 2021-09-30 PROCEDURE — C1726 CATH, BAL DIL, NON-VASCULAR: HCPCS

## 2021-09-30 PROCEDURE — P9016 RBC LEUKOCYTES REDUCED: HCPCS

## 2021-09-30 PROCEDURE — 10907ZC DRAINAGE OF AMNIOTIC FLUID, THERAPEUTIC FROM PRODUCTS OF CONCEPTION, VIA NATURAL OR ARTIFICIAL OPENING: ICD-10-PCS | Performed by: OBSTETRICS & GYNECOLOGY

## 2021-09-30 PROCEDURE — 30233M1 TRANSFUSION OF NONAUTOLOGOUS PLASMA CRYOPRECIPITATE INTO PERIPHERAL VEIN, PERCUTANEOUS APPROACH: ICD-10-PCS | Performed by: OBSTETRICS & GYNECOLOGY

## 2021-09-30 PROCEDURE — P9012 CRYOPRECIPITATE EACH UNIT: HCPCS

## 2021-09-30 PROCEDURE — 74011250636 HC RX REV CODE- 250/636: Performed by: ANESTHESIOLOGY

## 2021-09-30 PROCEDURE — P9040 RBC LEUKOREDUCED IRRADIATED: HCPCS

## 2021-09-30 PROCEDURE — 80053 COMPREHEN METABOLIC PANEL: CPT

## 2021-09-30 PROCEDURE — 36415 COLL VENOUS BLD VENIPUNCTURE: CPT

## 2021-09-30 PROCEDURE — 88307 TISSUE EXAM BY PATHOLOGIST: CPT

## 2021-09-30 PROCEDURE — 59400 OBSTETRICAL CARE: CPT | Performed by: ADVANCED PRACTICE MIDWIFE

## 2021-09-30 PROCEDURE — 75410000003 HC RECOV DEL/VAG/CSECN EA 0.5 HR: Performed by: OBSTETRICS & GYNECOLOGY

## 2021-09-30 PROCEDURE — 84550 ASSAY OF BLOOD/URIC ACID: CPT

## 2021-09-30 PROCEDURE — 77030020061 HC IV BLD WRMR ADMIN SET 3M -B: Performed by: ANESTHESIOLOGY

## 2021-09-30 PROCEDURE — 99284 EMERGENCY DEPT VISIT MOD MDM: CPT

## 2021-09-30 PROCEDURE — 74011250637 HC RX REV CODE- 250/637

## 2021-09-30 PROCEDURE — 86901 BLOOD TYPING SEROLOGIC RH(D): CPT

## 2021-09-30 PROCEDURE — 65270000029 HC RM PRIVATE

## 2021-09-30 PROCEDURE — 74011250636 HC RX REV CODE- 250/636: Performed by: NURSE ANESTHETIST, CERTIFIED REGISTERED

## 2021-09-30 PROCEDURE — 30233R1 TRANSFUSION OF NONAUTOLOGOUS PLATELETS INTO PERIPHERAL VEIN, PERCUTANEOUS APPROACH: ICD-10-PCS | Performed by: OBSTETRICS & GYNECOLOGY

## 2021-09-30 PROCEDURE — 30233K1 TRANSFUSION OF NONAUTOLOGOUS FROZEN PLASMA INTO PERIPHERAL VEIN, PERCUTANEOUS APPROACH: ICD-10-PCS | Performed by: OBSTETRICS & GYNECOLOGY

## 2021-09-30 RX ORDER — MISOPROSTOL 200 UG/1
TABLET ORAL
Status: COMPLETED
Start: 2021-09-30 | End: 2021-09-30

## 2021-09-30 RX ORDER — NALOXONE HYDROCHLORIDE 0.4 MG/ML
0.4 INJECTION, SOLUTION INTRAMUSCULAR; INTRAVENOUS; SUBCUTANEOUS AS NEEDED
Status: DISCONTINUED | OUTPATIENT
Start: 2021-09-30 | End: 2021-10-04 | Stop reason: HOSPADM

## 2021-09-30 RX ORDER — FENTANYL CITRATE 50 UG/ML
INJECTION, SOLUTION INTRAMUSCULAR; INTRAVENOUS
Status: COMPLETED
Start: 2021-09-30 | End: 2021-09-30

## 2021-09-30 RX ORDER — OXYTOCIN/RINGER'S LACTATE 30/500 ML
87.3 PLASTIC BAG, INJECTION (ML) INTRAVENOUS AS NEEDED
Status: DISCONTINUED | OUTPATIENT
Start: 2021-09-30 | End: 2021-10-04 | Stop reason: HOSPADM

## 2021-09-30 RX ORDER — OXYTOCIN/RINGER'S LACTATE 30/500 ML
2 PLASTIC BAG, INJECTION (ML) INTRAVENOUS
Status: DISCONTINUED | OUTPATIENT
Start: 2021-09-30 | End: 2021-10-04 | Stop reason: HOSPADM

## 2021-09-30 RX ORDER — LABETALOL HYDROCHLORIDE 5 MG/ML
INJECTION, SOLUTION INTRAVENOUS
Status: DISCONTINUED
Start: 2021-09-30 | End: 2021-09-30

## 2021-09-30 RX ORDER — ACETAMINOPHEN 325 MG/1
TABLET ORAL
Status: CANCELLED | OUTPATIENT
Start: 2021-09-30

## 2021-09-30 RX ORDER — FENTANYL CITRATE 50 UG/ML
50 INJECTION, SOLUTION INTRAMUSCULAR; INTRAVENOUS ONCE
Status: COMPLETED | OUTPATIENT
Start: 2021-09-30 | End: 2021-09-30

## 2021-09-30 RX ORDER — SODIUM CHLORIDE 9 MG/ML
250 INJECTION, SOLUTION INTRAVENOUS AS NEEDED
Status: DISCONTINUED | OUTPATIENT
Start: 2021-09-30 | End: 2021-09-30

## 2021-09-30 RX ORDER — OXYTOCIN/RINGER'S LACTATE 30/500 ML
10 PLASTIC BAG, INJECTION (ML) INTRAVENOUS AS NEEDED
Status: COMPLETED | OUTPATIENT
Start: 2021-09-30 | End: 2021-09-30

## 2021-09-30 RX ORDER — MAGNESIUM SULFATE HEPTAHYDRATE 40 MG/ML
4 INJECTION, SOLUTION INTRAVENOUS ONCE
Status: COMPLETED | OUTPATIENT
Start: 2021-09-30 | End: 2021-09-30

## 2021-09-30 RX ORDER — HYDROCORTISONE ACETATE PRAMOXINE HCL 2.5; 1 G/100G; G/100G
CREAM TOPICAL AS NEEDED
Status: DISCONTINUED | OUTPATIENT
Start: 2021-09-30 | End: 2021-10-04 | Stop reason: HOSPADM

## 2021-09-30 RX ORDER — CEFAZOLIN SODIUM 1 G/3ML
INJECTION, POWDER, FOR SOLUTION INTRAMUSCULAR; INTRAVENOUS AS NEEDED
Status: DISCONTINUED | OUTPATIENT
Start: 2021-09-30 | End: 2021-09-30 | Stop reason: HOSPADM

## 2021-09-30 RX ORDER — MISOPROSTOL 200 UG/1
200 TABLET ORAL ONCE
Status: COMPLETED | OUTPATIENT
Start: 2021-09-30 | End: 2021-09-30

## 2021-09-30 RX ORDER — ACETAMINOPHEN 325 MG/1
650 TABLET ORAL
Status: DISCONTINUED | OUTPATIENT
Start: 2021-09-30 | End: 2021-10-04 | Stop reason: HOSPADM

## 2021-09-30 RX ORDER — CALCIUM GLUCONATE 20 MG/ML
1 INJECTION, SOLUTION INTRAVENOUS ONCE
Status: DISPENSED | OUTPATIENT
Start: 2021-09-30 | End: 2021-10-01

## 2021-09-30 RX ORDER — SODIUM CHLORIDE 9 MG/ML
INJECTION, SOLUTION INTRAVENOUS
Status: DISCONTINUED | OUTPATIENT
Start: 2021-09-30 | End: 2021-09-30 | Stop reason: HOSPADM

## 2021-09-30 RX ORDER — SODIUM CHLORIDE, SODIUM LACTATE, POTASSIUM CHLORIDE, CALCIUM CHLORIDE 600; 310; 30; 20 MG/100ML; MG/100ML; MG/100ML; MG/100ML
1000 INJECTION, SOLUTION INTRAVENOUS AS NEEDED
Status: DISCONTINUED | OUTPATIENT
Start: 2021-09-30 | End: 2021-10-04 | Stop reason: HOSPADM

## 2021-09-30 RX ORDER — LABETALOL HYDROCHLORIDE 5 MG/ML
40 INJECTION, SOLUTION INTRAVENOUS ONCE
Status: COMPLETED | OUTPATIENT
Start: 2021-09-30 | End: 2021-09-30

## 2021-09-30 RX ORDER — ZOLPIDEM TARTRATE 5 MG/1
5 TABLET ORAL
Status: DISCONTINUED | OUTPATIENT
Start: 2021-09-30 | End: 2021-10-04 | Stop reason: HOSPADM

## 2021-09-30 RX ORDER — HYDROMORPHONE HYDROCHLORIDE 2 MG/ML
2 INJECTION, SOLUTION INTRAMUSCULAR; INTRAVENOUS; SUBCUTANEOUS
Status: DISCONTINUED | OUTPATIENT
Start: 2021-09-30 | End: 2021-10-04 | Stop reason: HOSPADM

## 2021-09-30 RX ORDER — HYDROCODONE BITARTRATE AND ACETAMINOPHEN 5; 325 MG/1; MG/1
1 TABLET ORAL
Status: DISCONTINUED | OUTPATIENT
Start: 2021-09-30 | End: 2021-10-04 | Stop reason: HOSPADM

## 2021-09-30 RX ORDER — FENTANYL CITRATE 50 UG/ML
100 INJECTION, SOLUTION INTRAMUSCULAR; INTRAVENOUS ONCE
Status: COMPLETED | OUTPATIENT
Start: 2021-09-30 | End: 2021-09-30

## 2021-09-30 RX ORDER — SODIUM CHLORIDE, SODIUM LACTATE, POTASSIUM CHLORIDE, CALCIUM CHLORIDE 600; 310; 30; 20 MG/100ML; MG/100ML; MG/100ML; MG/100ML
125 INJECTION, SOLUTION INTRAVENOUS CONTINUOUS
Status: DISCONTINUED | OUTPATIENT
Start: 2021-09-30 | End: 2021-10-04 | Stop reason: HOSPADM

## 2021-09-30 RX ORDER — OXYTOCIN/RINGER'S LACTATE 30/500 ML
PLASTIC BAG, INJECTION (ML) INTRAVENOUS
Status: COMPLETED
Start: 2021-09-30 | End: 2021-09-30

## 2021-09-30 RX ORDER — LABETALOL HYDROCHLORIDE 5 MG/ML
20 INJECTION, SOLUTION INTRAVENOUS ONCE
Status: COMPLETED | OUTPATIENT
Start: 2021-09-30 | End: 2021-09-30

## 2021-09-30 RX ORDER — OXYTOCIN/RINGER'S LACTATE 30/500 ML
PLASTIC BAG, INJECTION (ML) INTRAVENOUS
Status: DISCONTINUED | OUTPATIENT
Start: 2021-09-30 | End: 2021-09-30 | Stop reason: HOSPADM

## 2021-09-30 RX ORDER — PROPOFOL 10 MG/ML
INJECTION, EMULSION INTRAVENOUS AS NEEDED
Status: DISCONTINUED | OUTPATIENT
Start: 2021-09-30 | End: 2021-09-30 | Stop reason: HOSPADM

## 2021-09-30 RX ORDER — LABETALOL HYDROCHLORIDE 5 MG/ML
INJECTION, SOLUTION INTRAVENOUS
Status: COMPLETED
Start: 2021-09-30 | End: 2021-09-30

## 2021-09-30 RX ORDER — SODIUM CHLORIDE, SODIUM LACTATE, POTASSIUM CHLORIDE, CALCIUM CHLORIDE 600; 310; 30; 20 MG/100ML; MG/100ML; MG/100ML; MG/100ML
INJECTION, SOLUTION INTRAVENOUS
Status: DISCONTINUED | OUTPATIENT
Start: 2021-09-30 | End: 2021-09-30 | Stop reason: HOSPADM

## 2021-09-30 RX ORDER — MAGNESIUM SULFATE HEPTAHYDRATE 40 MG/ML
2 INJECTION, SOLUTION INTRAVENOUS ONCE
Status: DISCONTINUED | OUTPATIENT
Start: 2021-09-30 | End: 2021-09-30

## 2021-09-30 RX ADMIN — MISOPROSTOL 200 MCG: 200 TABLET ORAL at 13:09

## 2021-09-30 RX ADMIN — FENTANYL CITRATE 50 MCG: 50 INJECTION, SOLUTION INTRAMUSCULAR; INTRAVENOUS at 13:25

## 2021-09-30 RX ADMIN — FENTANYL CITRATE 100 MCG: 50 INJECTION INTRAMUSCULAR; INTRAVENOUS at 04:56

## 2021-09-30 RX ADMIN — LABETALOL HYDROCHLORIDE 20 MG: 5 INJECTION INTRAVENOUS at 12:44

## 2021-09-30 RX ADMIN — SODIUM CHLORIDE 250 ML: 9 INJECTION, SOLUTION INTRAVENOUS at 10:26

## 2021-09-30 RX ADMIN — MAGNESIUM SULFATE HEPTAHYDRATE 4 G: 40 INJECTION, SOLUTION INTRAVENOUS at 18:22

## 2021-09-30 RX ADMIN — FENTANYL CITRATE 50 MCG: 50 INJECTION INTRAMUSCULAR; INTRAVENOUS at 13:25

## 2021-09-30 RX ADMIN — SODIUM CHLORIDE, SODIUM LACTATE, POTASSIUM CHLORIDE, AND CALCIUM CHLORIDE 75 ML/HR: 600; 310; 30; 20 INJECTION, SOLUTION INTRAVENOUS at 19:30

## 2021-09-30 RX ADMIN — SODIUM CHLORIDE 250 ML: 9 INJECTION, SOLUTION INTRAVENOUS at 09:08

## 2021-09-30 RX ADMIN — ACETAMINOPHEN 650 MG: 325 TABLET ORAL at 18:50

## 2021-09-30 RX ADMIN — SODIUM CHLORIDE 250 ML: 9 INJECTION, SOLUTION INTRAVENOUS at 10:00

## 2021-09-30 RX ADMIN — SODIUM CHLORIDE: 900 INJECTION, SOLUTION INTRAVENOUS at 14:08

## 2021-09-30 RX ADMIN — LABETALOL HYDROCHLORIDE 40 MG: 5 INJECTION INTRAVENOUS at 13:12

## 2021-09-30 RX ADMIN — ZOLPIDEM TARTRATE 5 MG: 5 TABLET ORAL at 23:05

## 2021-09-30 RX ADMIN — HYDROMORPHONE HYDROCHLORIDE 2 MG: 2 INJECTION INTRAMUSCULAR; INTRAVENOUS; SUBCUTANEOUS at 08:19

## 2021-09-30 RX ADMIN — CEFAZOLIN 2 G: 330 INJECTION, POWDER, FOR SOLUTION INTRAMUSCULAR; INTRAVENOUS at 14:27

## 2021-09-30 RX ADMIN — OXYTOCIN 999 ML/HR: 10 INJECTION, SOLUTION INTRAMUSCULAR; INTRAVENOUS at 13:55

## 2021-09-30 RX ADMIN — MISOPROSTOL 200 MCG: 200 TABLET ORAL at 09:53

## 2021-09-30 RX ADMIN — SODIUM CHLORIDE 250 ML: 9 INJECTION, SOLUTION INTRAVENOUS at 09:30

## 2021-09-30 RX ADMIN — SODIUM CHLORIDE 250 ML: 9 INJECTION, SOLUTION INTRAVENOUS at 09:56

## 2021-09-30 RX ADMIN — LABETALOL HYDROCHLORIDE 20 MG: 5 INJECTION, SOLUTION INTRAVENOUS at 12:44

## 2021-09-30 RX ADMIN — PROPOFOL 50 MG: 10 INJECTION, EMULSION INTRAVENOUS at 14:00

## 2021-09-30 RX ADMIN — OXYTOCIN 100 ML/HR: 10 INJECTION, SOLUTION INTRAMUSCULAR; INTRAVENOUS at 14:31

## 2021-09-30 RX ADMIN — SODIUM CHLORIDE, POTASSIUM CHLORIDE, SODIUM LACTATE AND CALCIUM CHLORIDE 1000 ML: 600; 310; 30; 20 INJECTION, SOLUTION INTRAVENOUS at 04:35

## 2021-09-30 RX ADMIN — MAGNESIUM SULFATE 2 G/HR: 500 INJECTION, SOLUTION INTRAMUSCULAR; INTRAVENOUS at 18:48

## 2021-09-30 RX ADMIN — PROPOFOL 25 MG: 10 INJECTION, EMULSION INTRAVENOUS at 14:33

## 2021-09-30 RX ADMIN — PROPOFOL 50 MG: 10 INJECTION, EMULSION INTRAVENOUS at 14:07

## 2021-09-30 RX ADMIN — Medication 10000 MILLI-UNITS: at 13:56

## 2021-09-30 RX ADMIN — Medication 2 MILLI-UNITS/MIN: at 08:00

## 2021-09-30 RX ADMIN — PROPOFOL 25 MG: 10 INJECTION, EMULSION INTRAVENOUS at 14:37

## 2021-09-30 RX ADMIN — MAGNESIUM SULFATE 2 G/HR: 500 INJECTION, SOLUTION INTRAMUSCULAR; INTRAVENOUS at 23:58

## 2021-09-30 RX ADMIN — OXYTOCIN 2 MILLI-UNITS/MIN: 10 INJECTION, SOLUTION INTRAMUSCULAR; INTRAVENOUS at 08:00

## 2021-09-30 RX ADMIN — PROPOFOL 50 MG: 10 INJECTION, EMULSION INTRAVENOUS at 14:13

## 2021-09-30 RX ADMIN — SODIUM CHLORIDE, POTASSIUM CHLORIDE, SODIUM LACTATE AND CALCIUM CHLORIDE: 600; 310; 30; 20 INJECTION, SOLUTION INTRAVENOUS at 13:45

## 2021-09-30 RX ADMIN — SODIUM CHLORIDE 250 ML: 9 INJECTION, SOLUTION INTRAVENOUS at 10:08

## 2021-09-30 RX ADMIN — PROPOFOL 25 MG: 10 INJECTION, EMULSION INTRAVENOUS at 14:29

## 2021-09-30 RX ADMIN — SODIUM CHLORIDE, SODIUM LACTATE, POTASSIUM CHLORIDE, AND CALCIUM CHLORIDE 125 ML/HR: 600; 310; 30; 20 INJECTION, SOLUTION INTRAVENOUS at 06:32

## 2021-09-30 RX ADMIN — ACETAMINOPHEN 650 MG: 325 TABLET ORAL at 23:04

## 2021-09-30 NOTE — PROGRESS NOTES
Called emergently to OR for postpartum hemorrhage in setting of DIC with blood products hanging. Patient with known IUFD and suspected placental abruption. Dr. Ashley Gutiérrez and Lacie Console had just delivered baby and intact placenta. At the time pitocin was running and cytotec had been given. Bimanual performed. Uterus firm upon my exam ~18wks. Cervix and vagina were intact. Uterine bleeding continued at steady trickle. As such decision made to place Bakri. A dose of hemabate was also given at this time. Bakri Balloon Insertion  Indication: Postpartum hemorrhage  Procedure: Uterus and cervix examined. The cervix was grasped with ring forceps at 12, 4 and 8 o'clock. A stay sutureof chromic was placed at 4 o'clock. After a few attempts, the balloon was placed through the cervix and inflated to just under 250cc. Minimal bleeding noted from drain. No other active bleeding was noted. Patient Vitals for the past 4 hrs:   Temp Pulse BP   09/30/21 1226 -- 70 (!) 158/105   09/30/21 1216 -- 74 (!) 164/109   09/30/21 1214 -- 72 (!) 143/92   09/30/21 1126 -- 85 (!) 171/97   09/30/21 1113 98.3 °F (36.8 °C) -- --       We will continue to follow closely now on L&D. QEBL at this time: 2455 mL    She has so far received:    2 of cryo  2 of FFP  On 4th unit PRBC  On 3rd unit of platelets    CBC and repeat coags pending.      Signed By: Eleni Wylie MD     September 30, 2021

## 2021-09-30 NOTE — PROGRESS NOTES
Labor Progress Note  L&D called with hgb 6.4 - orders to infuse 2 units PRBCs stat  Reviewed with Neil Martinez and Beckie Cockayne- update IOL IUFD reviewed by Beckie Cockayne-     Pt has 4 IVs - FFP, Cryo, platelets, and fluids running       Visit Vitals  /81   Pulse 71   Temp 98.1 °F (36.7 °C)   Resp 14   Wt 187 lb 6.3 oz (85 kg)   Breastfeeding No   BMI 35.06 kg/m²       Physical Exam:  Minimal vaginal bleeding - per RN -   Uterus- abruption pattern, abdomen tight   Pt resting with IV dilaudid from 840- does not appear to be In active labor       Assessment/Plan:  Beckie Cockayne and Neil Martinez both agree- d/c pitocin at this time.    Give 200 mcg Misoprostil buccally STAT  CBC q 2 hours   Given 2 units PRBCs stat    Torsten Oakley CNM

## 2021-09-30 NOTE — PROGRESS NOTES
0800 Pt rec'd from [place], [facility]. Pt lying in bed.  service used to complete pt admission. Pt states she is having pain 5/10, denies wanting epidural, requesting IV pain management. Blood soaked pads noted, pericare done. QBL completed at bedside for loss of 270. MD and midwife aware. Pt assessed by Dr Tomas Ellis, [place] 96 2/80/-1. Orders given for pitocin augmentation. 0820 Midwife Darren Holman at bedside for assessment. Pt AROM with clear fluid. Midwife at the bedside to closely monitor bleeding. Pt remains stable, no apparent increase in bleeding.  used to inform patient of all procedures. IV dilaudid given for pain relief. 0830 CBC/T+S drawn per midwifes orders. Pt resting comfortably in bed. O2 via nasal cannula applied for O2 sat 90% following dilaudid administration. 2817 Platelet infusion begun. 2nd verifying nurse at bedside. Consent obtained via . 5317 New IV line started to accommodate additional transfusion orders. Pt latest hgb 6.5, 2 units PRBC ordered by MD in addition to platelets and cryo. Pt tolerating well. 1346 1st unit cryo started. Pt tolerating well. No bleeding noted. VSS; maintaining O2 sat on 2L O2. MD and midwife aware of pt status and all updates. Awaiting 2u PRBC for admin. 0065 Pitocin stopped per MD order. Buccal cytotec given 200mcg. 1008 1st unit PRBC started. Pt tolerating well. Small amount of bleeding noted. vss    1018 Becerra bag changed to urimeter. 1040 O2 DC. Pt maintaining O2 sats at 100%. Pt resting comfortably in the bed. No change in status at this time. Bleeding unchanged. Small amount noted on andrea pad    1110 Pt  at bedside.  used to discuss POC with  and patient. Midwife called to bedside to discuss pt status and poc further with use of .  #[id number]- Adriano Carrasquillo at bedside. Same  remains on the line for conversation    1200 CBC.  CMP, coags, p/c ratio resent. Orders per CNM Kenneth Dole    1244 IV Labetalol 20mg given at this time per Jt Jacques CNM.    1300 Dr An Felton at the bedside for assessment. SVE 6-7cm. Additional 200mcg cytotec given. 2 units FFB and 4 additional units platelets to be given. CNM called for anticipated delivery within the hour. Labetolol protocol being followed for elevated BP      1345 pt transported to OR for delivery. RUDDY BEY and anesthesia at bedside. 1354 Delivery of baby boy    46 Cytotec 1000mg given by JO-ANN Beltre CNM    1410 Hemabate given at this time. 1425 2g ancef given by anesthesia    1427 Bakri inserted at this time with 250cc fluid    1435 Pt stable. MD and RUDDY left bedside. Pt remains with RN in [address] [place] [address] for pericare    1505 Pt returned to room for PACU. VSS. Infant at bedside; pt given chance to hold and bell with infant. Bakri draining. Becerra draining appropriately. Pt experiencing diarrhea secondary to Hemabate. [address] at the bedside. Magnesium ordered. [facility], pastoral care at bedside. 1822 4gm Mag bolus started at this time. 1930 Report given to Ana Soto, RN and Naima Mendoza RN. Pt resting comfortably at the bedside with no complaints. Infant at the bedside. VSS.

## 2021-09-30 NOTE — PROGRESS NOTES
Problem: Vaginal Bleeding  Goal: *Cessation of vaginal bleeding  Outcome: Progressing Towards Goal  Goal: *Reassuring fetal surveillance  Outcome: Progressing Towards Goal     Problem: Patient Education: Go to Patient Education Activity  Goal: Patient/Family Education  Outcome: Progressing Towards Goal

## 2021-09-30 NOTE — PROGRESS NOTES
Patient known to me from office consultation.   Appreciate summation and detailed hx provided in [place], CNM's HPI    Will continue to follow closely with RUDDY and [facility] hospitalist; chloé and blood bank aware

## 2021-09-30 NOTE — PROGRESS NOTES
CNM over to speak to  (via  Said) and pt- reviewed condition and IUFD - status with blood products and plan to deliver vaginally if at all possible     Reviewed that pt condition is critical  verbalized understanding and asked appropriate questions      needs to return home to be with children he does not have  -     He will also need a note for work due to medical emergency.      Torsten Oakley CNM

## 2021-09-30 NOTE — PROGRESS NOTES
21  0347 Pt presented via ambulance with unknown complaint r/t non english speaking patient. Language spoken is swahili.     3880 Language line used verified complaint of pain and bleeding hx  all vaginal deliveries. Don't know name of physician. Admitted in computer provider [facility] midwives. 0400 vaginal bleeding noted unable to get fht's Dr Ceci Joaquin notified in [address] with C/S instructed to call back up on call physician. Lyric Bautista CNM called to room. 0405 ultrasound to assess fht's no fetal heart tones noted. 0229 Dr Ceci Joaquin at bedside using language line to communicated with  Pt. Ultrasound done no fht's noted  0420 Dr Yasmin Sanchez and Dr Saeid Gonzalez at bedside communicating with Dr Ceci Joaquin. Dr Yasmin Sanchez aware of prenatal history. Fetal demise comfirmed     65 Dr Ceci Joaquin comfirm fetal demise and communicated to pt. Via language line and  call via cell phone and also communicated via language line. 0425 sve by ALFRED Apodaca CNM 3/70/-3 bleeding noted     0434 Dr Rosenberg in room to start 2nd IV line    0735 Bedside shift change report given to GEMMA Swann (oncoming nurse) by Mallory Youngblood Rn (offgoing nurse). Report included the following information SBAR, MAR and Med Rec Status.

## 2021-09-30 NOTE — PROGRESS NOTES
Problem: Vaginal Bleeding  Goal: *Cessation of vaginal bleeding  9/30/2021 0710 by Genna Escamilla RN  Outcome: Progressing Towards Goal  9/30/2021 0709 by Genna Escamilla RN  Outcome: Progressing Towards Goal  Goal: *Reassuring fetal surveillance  9/30/2021 0710 by Genna Escamilla RN  Outcome: Progressing Towards Goal  9/30/2021 0709 by Genna Escamilla RN  Outcome: Progressing Towards Goal     Problem: Patient Education: Go to Patient Education Activity  Goal: Patient/Family Education  9/30/2021 0710 by Genna Escamilla RN  Outcome: Progressing Towards Goal  9/30/2021 0709 by Genna Escamilla RN  Outcome: Progressing Towards Goal     Problem: Fetal Demise Care Plan  Goal: *Able to cope  Outcome: Progressing Towards Goal     Problem: Patient Education: Go to Patient Education Activity  Goal: Patient/Family Education  Outcome: Progressing Towards Goal

## 2021-09-30 NOTE — H&P
Labor and Delivery Admission Note  2021    31 y.o., , female, [facility]  Estimated Date of Delivery: 10/18/21 by dates and US presents with contractions and vaginal bleeding at 0347    Pt. States  Painful contractions started around 8 pm and then started bleeding at 2 am and prompted her to come to the ED. Pt On arrival to the [facility] , RUDDY Oskar Mendoza was at  at the bedside - no fetal heart tones noted. I was in the OR operating and was called at the bedside for confirmation  Further U/S at the bedside - no cardiac activity noted. Dr. Beckie Cockayne  At the bedside with confirmation of no fetal cardiac activity. and Dr. Sydni Bustos at the bedside too    All the conversation was done with the help of  phones. Condolences offered to the pt. And spouse. Provider: Rafael    Teja 39 by 2nd Tri Sono  Pertinents:  IUGR - MFM consult for dopplers - weekly BPPs - likely IOL at 45- 44 weeks or sooner as per MFM  Late to care  Proven to 4 Kg  All babies born FT/ in [facility]  7 yr old Female, 5 yr old Male, 8 yr old Female, 10 yr old Male, 1 yr old Male  Prenatal   IOB labs:   Genetic Screening: declines  Anatomy: Male, normal Naima, 3VC, Marginal Cord insert, repeat Growth @ 28 wks____  GTT: 114  Flu:  TDAP:done  Rhogam: Opos  Third Tri Labs:  GBS:  COVID:     Pain mgmt. in labor: Low intervention  Feeding:  Circ:  Social:  Speaks Swahili (needs phone      [facility]: Blood type: O            RH: pos            Rubella:             SVII serology: neg             GBS status:   Past Medical History:   Diagnosis Date    Routine screening for STI (sexually transmitted infection) 2020    Health Dept labs: Hep B testing with immunity to hepatitis B (reactive sAb, with negative sAg and total core Ab); HIV non-reactive; T pallidum/syphilis non-reactive; HCV negative. GC/CT negative.  Screening for iron deficiency anemia 2020    Health Dept labs:  Hgb 11.9 with MCV 77.   Remainder of H18 normal.  Chem 8 normal except CO2 18.  Screening for tuberculosis 03/16/2020    Health Dept labs:  T-spot negative. No past surgical history on file. OB/GYN: Dr. Georgia Duarte  Meds:   Current Facility-Administered Medications   Medication Dose Route Frequency    HYDROmorphone (PF) (DILAUDID) injection 2 mg  2 mg IntraVENous Q4H PRN    lactated ringers bolus infusion 500-1,000 mL  500-1,000 mL IntraVENous PRN     Allergies: No Known Allergies  Pertinent ROS: per HPI   Family History   Problem Relation Age of Onset    No Known Problems Mother     No Known Problems Father      Social History     Socioeconomic History    Marital status:      Spouse name: Not on file    Number of children: Not on file    Years of education: Not on file    Highest education level: Not on file   Occupational History    Not on file   Tobacco Use    Smoking status: Never Smoker    Smokeless tobacco: Never Used   Substance and Sexual Activity    Alcohol use: Not Currently    Drug use: Never    Sexual activity: Not Currently   Other Topics Concern     Service Not Asked    Blood Transfusions Not Asked    Caffeine Concern Not Asked    Occupational Exposure Not Asked    Hobby Hazards Not Asked    Sleep Concern Not Asked    Stress Concern Not Asked    Weight Concern Not Asked    Special Diet Not Asked    Back Care Not Asked    Exercise Not Asked    Bike Helmet Not Asked    Seat Belt Not Asked    Self-Exams Not Asked   Social History Narrative    ** Merged History Encounter **          Social Determinants of Health     Financial Resource Strain:     Difficulty of Paying Living Expenses:    Food Insecurity:     Worried About Running Out of Food in the Last Year:     Ran Out of Food in the Last Year:    Transportation Needs:     Lack of Transportation (Medical):      Lack of Transportation (Non-Medical):    Physical Activity:     Days of Exercise per Week:     Minutes of Exercise per Session: Stress:     Feeling of Stress :    Social Connections:     Frequency of Communication with Friends and Family:     Frequency of Social Gatherings with Friends and Family:     Attends Buddhism Services:     Active Member of Clubs or Organizations:     Attends Club or Organization Meetings:     Marital Status:    Intimate Partner Violence:     Fear of Current or Ex-Partner:     Emotionally Abused:     Physically Abused:     Sexually Abused:        OBJECTIVE:  Gravid , female NAD  No data recorded. There were no vitals taken for this visit. Labs:    Lab Results   Component Value Date/Time    WBC 5.3 2021 10:00 AM       Exam:  HEENT:  normal   Lungs:  clear  Cor:  RRR  Abdomen:  Fundal height 38 cm                    +tetanic contractions  No cardiac activity noted    Cervix: 3 cm/Moderate vaginal bleeding  Fluid:+mild bleeding      Impression:  IUP at  @ 37 weeks 3 days complicated by IUGR presented with painful vaginal bleeding ? Placental Abruption and absent fetal heart tones    Condolences offered.   Pt.and spouse verbalizes understanding and all questions answered  Discussed IOL with pitocin   Indications for  delivery discussed  Coags ordered, CBC, CMP, Fibrinogen, U/A, UDS      Eusebio Lemon MD

## 2021-09-30 NOTE — ANESTHESIA PREPROCEDURE EVALUATION
Relevant Problems   No relevant active problems       Anesthetic History   No history of anesthetic complications            Review of Systems / Medical History  Patient summary reviewed, nursing notes reviewed and pertinent labs reviewed    Pulmonary  Within defined limits                 Neuro/Psych   Within defined limits           Cardiovascular  Within defined limits                Exercise tolerance: >4 METS     GI/Hepatic/Renal  Within defined limits              Endo/Other  Within defined limits           Other Findings   Comments: Abruption with fetal demise  Coagulopathy            Physical Exam    Airway  Mallampati: II  TM Distance: 4 - 6 cm  Neck ROM: normal range of motion   Mouth opening: Normal     Cardiovascular    Rhythm: regular  Rate: normal         Dental  No notable dental hx       Pulmonary  Breath sounds clear to auscultation               Abdominal  Abdominal exam normal       Other Findings            Anesthetic Plan    ASA: 3, emergent  Anesthesia type: MAC and general - backup          Induction: Intravenous      Emergent, does not speak english

## 2021-09-30 NOTE — PROGRESS NOTES
Labor Progress Note  CNM at bedside to assess pt   Coags reviewed with Bairon Ingram and Sandeep Ellis consulted   francisco consulted with Anesthesia Grace Raphael)  [facility]  utilized  Patient seen, fetal heart rate and contraction pattern evaluated- permission obtained for SVE and AROM - reviewed risk of possible hemorrhage with pt with AROM - pt  not at bedside pt states he is home with kids- she is in agreement with AROM to facilitate birth     Visit Vitals  /63   Pulse 76   Temp 98.5 °F (36.9 °C)   Resp 16   Wt 187 lb 6.3 oz (85 kg)   Breastfeeding No   BMI 35.06 kg/m²       Physical Exam:  Cervical Exam:  3/80/-2 fetal head well applied  Membranes:  Artificial Rupture of Membranes;  Amniotic Fluid: small amount of thin bloody fluid fluid  Uterine - uterus moderately tight difficulty assessing for contractions- pt has been able to rest with IV pain medication   piocin 2 miliunits    Assessment/Plan:  IUFD  Abruption  Continue plan for delivery   Collaborative pt - Herve Kirby all aware - anes aware of pt condition   Platelets, Cyro, FFP- in this order per anesthesia  Increase pit 2/20 mins      Rima Lee CNM

## 2021-09-30 NOTE — PROGRESS NOTES
OB Hospitalist    Called to the bedside to evaluate vaginal bleeding. Pt.  With mild contractions  Cervix 3/50/-3  Mild bleeding noted  Platelets 70   H &H 6.8/70.5  Will start Pitocin for IOL  2 units of PRBC on hold  Coags, Fibrinogen pending  Indications for operative delivery were discussed with pt      Shukri Zapien MD

## 2021-09-30 NOTE — PROGRESS NOTES
Spiritual Care Assessment/Progress Note  [address]      NAME: Ev Crawford      MRN: [id number]  AGE: 31 y.o. SEX: female  Pentecostal Affiliation: Unknown   Language: Swahili     2021     Total Time (in minutes): 34     Spiritual Assessment begun in [facility] 3 LABOR & DELIVERY through conversation with:         []Patient        [] Family    [] Friend(s)        Reason for Consult: Crisis     Spiritual beliefs: (Please include comment if needed)     [x] Identifies with a jacinto tradition:         [] Supported by a jacinto community:            [] Claims no spiritual orientation:           [] Seeking spiritual identity:                [] Adheres to an individual form of spirituality:           [] Not able to assess:                           Identified resources for coping:      [x] Prayer                               [] Music                  [] Guided Imagery     [x] Family/friends                 [] Pet visits     [] Devotional reading                         [] Unknown     [] Other:                                             Interventions offered during this visit: (See comments for more details)    Patient Interventions: Affirmation of jacinto, Prayer (actual),  loss           Plan of Care:     [] Support spiritual and/or cultural needs    [] Support AMD and/or advance care planning process      [] Support grieving process   [] Coordinate Rites and/or Rituals    [] Coordination with community clergy   [] No spiritual needs identified at this time   [] Detailed Plan of Care below (See Comments)  [] Make referral to Music Therapy  [] Make referral to Pet Therapy     [] Make referral to Addiction services  [] Make referral to [facility]  [] Make referral to Spiritual Care Partner  [] No future visits requested        [] Follow up upon further referrals     Comments: Responded to page for Fetal Demise. Interpretor use for this assessment: Nikki Ruiz is [place] and from the [facility].  She identified as  Steve Villalobos and requested prayer. Provided empathic listening, grief support, and prayer. Also cultivated a relationship of trust, compassion, and support. Bonifacio was appreciative of visit and prayer. Visited by: Abril Rosenthal.  Wayne Mason[address] paging Service [phone])

## 2021-09-30 NOTE — L&D DELIVERY NOTE
Delivery Note  Pt progressing quickly to second stage- transferred to OR for delivery- A. Aman Evans and Zakiya Oakley at bedside for high risk of PPH due to abruption and bleeding with IUFD. Pt became complete with strong urge to push- fetal head delivered in OA position with maternal pushing efforts followed quickly by body- 3 vessel cord clamped x 2 then cut by CNM - placenta immediately followed with very little active management- followed by large amount of clots and rafa blood- approx 1600. Uterine massage, bimanual massage then cytotec 1000 mcg placed rectal, Aman Evans and Anderson Schmitt at bedside with [facility] for assessment. Uterine massage continued uterus started to firm with efforts. Anderson Schmitt assessed and mamadoueced ceci - see note-   MD assumed care of pt to complete immediate post partum hemorrhage care. QBL 2255 for labor and birth -   Blood products continuing to run per anesthesia. Pt stabilized to be transferred to  for close observation. [facility] to remain in place. Obstetrician:  Jon Carpenter CNM    Assistant: Zakiya Gross at bedside    Pre-Delivery Diagnosis: Pregnancy complicated by: severe IUGR, placental abruption and IUFD    Post-Delivery Diagnosis: Stillborn infant(s) male    Intrapartum Event: Abruption, Hypertension and hemorrage    Procedure: Spontaneous vaginal delivery    Epidural: YES    Monitor:  Uterine Contractions - External    Indications for instrumental delivery: none     Estimated Blood Loss: No data found    Episiotomy: none    Laceration(s):  none    Laceration(s) repair: NO    Presentation: Cephalic    Fetal Description: nava    Fetal Position: Occiput Anterior    Birth Weight: TBD    Birth Length: TBD     Apgar - One Minute: 0    Apgar - Five Minutes: 0    Umbilical Cord: 3 vessels present  Specimens: none           Cord Blood Results: This patient has no babies on file.   Prenatal Labs:     Lab Results   Component Value Date/Time    ABO/Rh(D) O POSITIVE 2021 04:28 AM    HBsAg, External NEGATIVE 06/28/2021 12:00 AM    HIV, External NON REACTIVE 06/28/2021 12:00 AM    RPR, External NON REACTIVE 06/28/2021 12:00 AM    GrBStrep, External NEGATIVE 09/27/2021 12:00 AM        Attending Attestation: I was present and scrubbed for the entire procedure        [id number]}

## 2021-09-30 NOTE — H&P
Spoke to Alexsander and reviewed chart, blood pressures and blood products up to this time. Plan to start magnesium at this time for labile pressures up to severe range to protect pt.      Patient Vitals for the past 24 hrs:   Temp Pulse Resp BP SpO2   09/30/21 1753 -- 87 -- 130/69 98 %   09/30/21 1743 -- 75 -- -- --   09/30/21 1733 -- 71 -- 138/67 97 %   09/30/21 1724 -- 71 -- 139/83 --   09/30/21 1713 -- 72 -- (!) 153/74 96 %   09/30/21 1703 -- 67 -- (!) 158/88 97 %   09/30/21 1653 -- 78 -- (!) 164/93 98 %   09/30/21 1643 -- 76 -- (!) 177/94 99 %   09/30/21 1627 -- 70 -- (!) 145/88 --   09/30/21 1612 -- 71 -- (!) 141/89 --   09/30/21 1557 -- 74 -- (!) 150/86 --   09/30/21 1542 -- 71 -- (!) 151/90 --   09/30/21 1528 -- 86 -- (!) 164/90 91 %   09/30/21 1505 98.6 °F (37 °C) 85 16 (!) 145/63 98 %   09/30/21 1226 -- 70 -- (!) 158/105 --   09/30/21 1216 -- 74 -- (!) 164/109 --   09/30/21 1214 -- 72 -- (!) 143/92 --   09/30/21 1126 -- 85 -- (!) 171/97 --   09/30/21 1113 98.3 °F (36.8 °C) -- -- -- --   09/30/21 1110 -- 81 -- (!) 158/109 --   09/30/21 1055 -- 77 -- (!) 142/95 --   09/30/21 1043 -- 81 -- -- --   09/30/21 1041 -- 81 -- (!) 143/96 --   09/30/21 1027 97.6 °F (36.4 °C) 80 15 (!) 151/88 100 %   09/30/21 1026 -- 81 -- (!) 151/88 --   09/30/21 1010 97.7 °F (36.5 °C) 89 15 139/81 98 %   09/30/21 0956 -- 82 -- 135/80 --   09/30/21 0953 98 °F (36.7 °C) 77 14 135/80 95 %   09/30/21 0941 -- 78 -- 125/76 --   09/30/21 0938 98.4 °F (36.9 °C) 77 14 125/76 98 %   09/30/21 0925 -- 67 -- 123/81 --   09/30/21 0923 98.1 °F (36.7 °C) 71 14 123/81 98 %   09/30/21 0910 -- 69 -- 121/80 --   09/30/21 0853 99 °F (37.2 °C) 70 16 121/80 --   09/30/21 0440 98.5 °F (36.9 °C) 76 16 118/63 --   09/30/21 0405 -- 65 16 122/72 --         Donya Maya CNM

## 2021-09-30 NOTE — PROGRESS NOTES
EBL since Bakri placement 125cc  Coag studies improving. No current blood products being given at this time.   Nydia Mabry

## 2021-09-30 NOTE — PROGRESS NOTES
I spoke to pt and  via  [place]-   Pt is feeling better physically- has a headache. Denies VC or RUQ pain. Discussed Magnesium therapy for minimum 12 hours likely 24 - '  Pt  asking about release of the baby for burial with the Confucianism- we will facilitate the answers to those questions ASAP tomorrow.      Pt and  deny wanting autopsy or hospital burial.    Shawn Hi CNM

## 2021-09-30 NOTE — ANESTHESIA POSTPROCEDURE EVALUATION
Post-Anesthesia Evaluation and Assessment    Patient: Kori Nash MRN: [id number]  SSN: [id number]    YOB: 1995  Age: 31 y.o. Sex: female      I have evaluated the patient and they are stable and ready for discharge from the PACU. Cardiovascular Function/Vital Signs  Visit Vitals  BP (!) 145/63   Pulse 85   Temp 37 °C (98.6 °F)   Resp 16   Wt 85 kg (187 lb 6.3 oz)   SpO2 98%   Breastfeeding No   BMI 35.06 kg/m²       Patient is status post Other anesthesia for Procedure(s):  VAGINAL EXAMINATION UNDER ANESTHESIA. Nausea/Vomiting: None    Postoperative hydration reviewed and adequate. Pain:  Pain Scale 1: Labor Algorithm/Pain Intensity (09/30/21 1203)  Pain Intensity 1: 5 (09/30/21 0804)   Managed    Neurological Status: At baseline    Mental Status, Level of Consciousness: Alert and  oriented to person, place, and time    Pulmonary Status:   O2 Device: None (room air) (09/30/21 1505)   Adequate oxygenation and airway patent    Complications related to anesthesia: None    Post-anesthesia assessment completed.  No concerns    Signed By: Nick Sorenson MD     September 30, 2021

## 2021-09-30 NOTE — H&P
History & Physical    Name: Marita Gant MRN: [id number]  SSN: [id number]    YOB: 1995  Age: 31 y.o. Sex: female        Subjective:     Estimated Date of Delivery: 10/18/21  OB History        6    Para   5    Term   5       0    AB   0    Living           SAB   0    TAB   0    Ectopic   0    Molar   0    Multiple        Live Births                  Addendum to Manpreet Feldman H&P from 21 @ [phone]-    Pt seen in office 8/10 &   canceled apts in [facility] (no show in office)-   In office  for US - noted severe IUGR  No show MFM -  - no show in office - office checked for MFM report - no report seen - called pt         Pt has been followed closely for Severe IUGR noted on office scan 21- pt had not been eating or drinking water - Mendel Palin reviewed scan - recommendation for MFM apt for dopplers and recommendations - pt notified and aware of need for apt   Referral placed and MFM called and pt notified of MFM apt by office nurse-   Pt missed MFM apt - we were not notified of missed apt- Pt no showed for  office vist  - we followed up to check on MFM report , became aware of no show at that time- Pt and SO were called on  (see note) - she missed apt due to transporation- extraordinary efforts made to assist pt with transportation assistance from our office nurse to give pt good options for getting to apts- Nurse was met with resistance -   Pt was informed to come to office any time she can get here, we would work her in for a BPP and to see her- no restrictions on time frames to increase access to care for assessments.     Pt in office to see Gideon Munson - see note- US reviewed with Mendel Palin during visit- pt encouraged strongly to have IOL for IUGR - pt counseled on IUFD risks  Per DT note- pt declined, wanted to speak to her  and promised to return to office  for increased FS-  BPP - pt and  both present for apt with DT - reviewed severity of situation- again declines IOL - wanted to rest through weekend and to have the energy for labor- MFM apt scheduled for - office apt with BPP scheduled for 10/1 - IOL Monday 10/4 at pt request - DT provided notes to be off work for both [employer] and her  to be out of work for [employer] and IOL-         Patient Active Problem List    Diagnosis    24 weeks gestation of pregnancy     Provider: Rafael    EDC by 2nd Tri Sono  Pertinents:  IUGR - MFM consult for dopplers - weekly BPPs - likely IOL at 45- 44 weeks or sooner as per MFM  Late to care  Proven to 4 Kg  All babies [facility] FT[facility] in [facility]  7 yr old Female, 5 yr old Male, 8 yr old Female, 10 yr old Male, 1 yr old Male  Prenatal   IOB labs:   Genetic Screening: declines  Anatomy: Male, normal Naima, 3VC, Marginal Cord insert, repeat Growth @ 28 wks____  GTT: 114  Flu:  TDAP:done  Rhogam: Opos  Third Tri Labs:  GBS:  COVID:    Pain mgmt. in labor: Low intervention  Feeding:  Circ:  Social:  Speaks Swahili (needs phone)       No specialty comments available. Past Medical History:   Diagnosis Date    Routine screening for STI (sexually transmitted infection) 2020    Health Dept labs: Hep B testing with immunity to hepatitis B (reactive sAb, with negative sAg and total core Ab); HIV non-reactive; T pallidum/syphilis non-reactive; HCV negative. GC/CT negative.  Screening for iron deficiency anemia 2020    Health Dept labs:  Hgb 11.9 with MCV 77. Remainder of H18 normal.  Chem 8 normal except CO2 18.  Screening for tuberculosis 2020    Health Dept labs:  T-spot negative. No past surgical history on file.   Social History     Occupational History    Not on file   Tobacco Use    Smoking status: Never Smoker    Smokeless tobacco: Never Used   Substance and Sexual Activity    Alcohol use: Not Currently    Drug use: Never    Sexual activity: Not Currently     Family History   Problem Relation Age of Onset    No Known Problems Mother     No Known Problems Father        No Known Allergies  Prior to Admission medications    Medication Sig Start Date End Date Taking? Authorizing Provider   prenatal vit-calcium-iron-fa (PRENATAL PLUS with CALCIUM) 27 mg iron- 1 mg tab Take 1 Tablet by mouth daily. 8/10/21   Chelsea Cantrell CNM   iron, carbonyl (FEOSOL) 45 mg tab Take 1 Tablet by mouth daily.   Patient not taking: Reported on 8/10/2021 7/27/21   Chelsea Cantrell CNM       Signed By:  Juel Jeans, CNM     September 30, 2021

## 2021-10-01 LAB
ALBUMIN SERPL-MCNC: 2.1 G/DL (ref 3.5–5)
ALBUMIN SERPL-MCNC: 2.1 G/DL (ref 3.5–5)
ALBUMIN/GLOB SERPL: 0.7 {RATIO} (ref 1.1–2.2)
ALBUMIN/GLOB SERPL: 0.7 {RATIO} (ref 1.1–2.2)
ALP SERPL-CCNC: 175 U/L (ref 45–117)
ALP SERPL-CCNC: 184 U/L (ref 45–117)
ALT SERPL-CCNC: 15 U/L (ref 12–78)
ALT SERPL-CCNC: 17 U/L (ref 12–78)
ANION GAP SERPL CALC-SCNC: 6 MMOL/L (ref 5–15)
ANION GAP SERPL CALC-SCNC: 9 MMOL/L (ref 5–15)
AST SERPL-CCNC: 23 U/L (ref 15–37)
AST SERPL-CCNC: 25 U/L (ref 15–37)
BILIRUB SERPL-MCNC: 0.3 MG/DL (ref 0.2–1)
BILIRUB SERPL-MCNC: 0.4 MG/DL (ref 0.2–1)
BLD PROD TYP BPU: NORMAL
BPU ID: NORMAL
BUN SERPL-MCNC: 7 MG/DL (ref 6–20)
BUN SERPL-MCNC: 8 MG/DL (ref 6–20)
BUN/CREAT SERPL: 10 (ref 12–20)
BUN/CREAT SERPL: 12 (ref 12–20)
CALCIUM SERPL-MCNC: 7.3 MG/DL (ref 8.5–10.1)
CALCIUM SERPL-MCNC: 7.4 MG/DL (ref 8.5–10.1)
CHLORIDE SERPL-SCNC: 107 MMOL/L (ref 97–108)
CHLORIDE SERPL-SCNC: 108 MMOL/L (ref 97–108)
CO2 SERPL-SCNC: 23 MMOL/L (ref 21–32)
CO2 SERPL-SCNC: 24 MMOL/L (ref 21–32)
CREAT SERPL-MCNC: 0.67 MG/DL (ref 0.55–1.02)
CREAT SERPL-MCNC: 0.7 MG/DL (ref 0.55–1.02)
ERYTHROCYTE [DISTWIDTH] IN BLOOD BY AUTOMATED COUNT: 14.7 % (ref 11.5–14.5)
ERYTHROCYTE [DISTWIDTH] IN BLOOD BY AUTOMATED COUNT: 14.9 % (ref 11.5–14.5)
GLOBULIN SER CALC-MCNC: 3 G/DL (ref 2–4)
GLOBULIN SER CALC-MCNC: 3.2 G/DL (ref 2–4)
GLUCOSE SERPL-MCNC: 113 MG/DL (ref 65–100)
GLUCOSE SERPL-MCNC: 87 MG/DL (ref 65–100)
HCT VFR BLD AUTO: 21.9 % (ref 35–47)
HCT VFR BLD AUTO: 22.8 % (ref 35–47)
HGB BLD-MCNC: 7.4 G/DL (ref 11.5–16)
HGB BLD-MCNC: 7.8 G/DL (ref 11.5–16)
LDH SERPL L TO P-CCNC: 330 U/L (ref 81–246)
MCH RBC QN AUTO: 26.7 PG (ref 26–34)
MCH RBC QN AUTO: 27 PG (ref 26–34)
MCHC RBC AUTO-ENTMCNC: 33.8 G/DL (ref 30–36.5)
MCHC RBC AUTO-ENTMCNC: 34.2 G/DL (ref 30–36.5)
MCV RBC AUTO: 78.9 FL (ref 80–99)
MCV RBC AUTO: 79.1 FL (ref 80–99)
NRBC # BLD: 0 K/UL (ref 0–0.01)
NRBC # BLD: 0 K/UL (ref 0–0.01)
NRBC BLD-RTO: 0 PER 100 WBC
NRBC BLD-RTO: 0 PER 100 WBC
PLATELET # BLD AUTO: 56 K/UL (ref 150–400)
PLATELET # BLD AUTO: 64 K/UL (ref 150–400)
PMV BLD AUTO: 11.7 FL (ref 8.9–12.9)
PMV BLD AUTO: 9.5 FL (ref 8.9–12.9)
POTASSIUM SERPL-SCNC: 3.5 MMOL/L (ref 3.5–5.1)
POTASSIUM SERPL-SCNC: 3.8 MMOL/L (ref 3.5–5.1)
PROT SERPL-MCNC: 5.1 G/DL (ref 6.4–8.2)
PROT SERPL-MCNC: 5.3 G/DL (ref 6.4–8.2)
RBC # BLD AUTO: 2.77 M/UL (ref 3.8–5.2)
RBC # BLD AUTO: 2.89 M/UL (ref 3.8–5.2)
SODIUM SERPL-SCNC: 138 MMOL/L (ref 136–145)
SODIUM SERPL-SCNC: 139 MMOL/L (ref 136–145)
STATUS OF UNIT,%ST: NORMAL
UNIT DIVISION, %UDIV: 0
URATE SERPL-MCNC: 7.2 MG/DL (ref 2.6–6)
WBC # BLD AUTO: 7.4 K/UL (ref 3.6–11)
WBC # BLD AUTO: 7.6 K/UL (ref 3.6–11)

## 2021-10-01 PROCEDURE — 65270000029 HC RM PRIVATE

## 2021-10-01 PROCEDURE — 36415 COLL VENOUS BLD VENIPUNCTURE: CPT

## 2021-10-01 PROCEDURE — 80053 COMPREHEN METABOLIC PANEL: CPT

## 2021-10-01 PROCEDURE — 74011250637 HC RX REV CODE- 250/637: Performed by: ADVANCED PRACTICE MIDWIFE

## 2021-10-01 PROCEDURE — 74011250636 HC RX REV CODE- 250/636: Performed by: OBSTETRICS & GYNECOLOGY

## 2021-10-01 PROCEDURE — 84550 ASSAY OF BLOOD/URIC ACID: CPT

## 2021-10-01 PROCEDURE — 83615 LACTATE (LD) (LDH) ENZYME: CPT

## 2021-10-01 PROCEDURE — 74011000258 HC RX REV CODE- 258: Performed by: ADVANCED PRACTICE MIDWIFE

## 2021-10-01 PROCEDURE — 74011250636 HC RX REV CODE- 250/636: Performed by: ADVANCED PRACTICE MIDWIFE

## 2021-10-01 PROCEDURE — 85027 COMPLETE CBC AUTOMATED: CPT

## 2021-10-01 RX ADMIN — SODIUM CHLORIDE, SODIUM LACTATE, POTASSIUM CHLORIDE, AND CALCIUM CHLORIDE 125 ML/HR: 600; 310; 30; 20 INJECTION, SOLUTION INTRAVENOUS at 18:49

## 2021-10-01 RX ADMIN — SODIUM CHLORIDE, SODIUM LACTATE, POTASSIUM CHLORIDE, AND CALCIUM CHLORIDE 75 ML/HR: 600; 310; 30; 20 INJECTION, SOLUTION INTRAVENOUS at 04:37

## 2021-10-01 RX ADMIN — MAGNESIUM SULFATE 2 G/HR: 500 INJECTION, SOLUTION INTRAMUSCULAR; INTRAVENOUS at 10:00

## 2021-10-01 RX ADMIN — ACETAMINOPHEN 650 MG: 325 TABLET ORAL at 08:15

## 2021-10-01 RX ADMIN — MAGNESIUM SULFATE 2 G/HR: 500 INJECTION, SOLUTION INTRAMUSCULAR; INTRAVENOUS at 04:37

## 2021-10-01 RX ADMIN — HYDROMORPHONE HYDROCHLORIDE 2 MG: 2 INJECTION INTRAMUSCULAR; INTRAVENOUS; SUBCUTANEOUS at 12:11

## 2021-10-01 RX ADMIN — HYDROCODONE BITARTRATE AND ACETAMINOPHEN 1 TABLET: 5; 325 TABLET ORAL at 21:12

## 2021-10-01 NOTE — PROGRESS NOTES
1500 pt sleeping comfortably at this time. Bleeding remains scant and WNL at this time. 1910 pastoral care paged to request a blessing for baby. 1915 on phone with  to get consent signed for autopsy and release of remains.

## 2021-10-01 NOTE — ROUTINE PROCESS
Received OB SBAR Report from JESSICA Copeland RN    2040 Labs drawn and sent    2119:  Using translation services. ( Number [id number]) Discussed vital records, pain, medications. Patient stated she had a headache due to not having eaten in two days, gave patient crackers and juice, pain meds due at 2250, patient stated she was okay to wait on pain medicine. Vital records paperwork started, patient teary, requesting to wait for FOB to be present in the morning, stated he would be here early, no further questions/needs at this time    2156 Lab recollect    2312 JO-ANN Beltre CNM updated via phone, no further orders    0000 Patient resting comfortably, no complaints of headache    0625 Updated JO-ANN Beltre CNM, orders for lab repeat once patient is awake

## 2021-10-01 NOTE — PROGRESS NOTES
Post-Partum Day 1 Progress Note    Patient reporting uterine discomfot. Headache on magnesium now. Bakri in place    Vitals:  Patient Vitals for the past 8 hrs:   BP Temp Pulse Resp SpO2   10/01/21 0800 109/69 98.7 °F (37.1 °C) 88 14 95 %   10/01/21 0700 113/68 -- 97 14 98 %   10/01/21 0600 107/64 -- 89 14 94 %   10/01/21 0500 107/61 -- 90 14 93 %   10/01/21 0400 (!) 100/55 98.7 °F (37.1 °C) 97 14 95 %   10/01/21 0300 (!) 95/55 -- (!) 111 14 94 %   10/01/21 0200 (!) 106/53 -- (!) 108 15 96 %     Temp (24hrs), Av.2 °F (36.8 °C), Min:97.6 °F (36.4 °C), Max:98.7 °F (37.1 °C)    Assessment and Plan:    PPD1 of IUFD. Coags and CBC pending now. Will start to deflate Bakri. Continue pain mgmt. Continue magnesium for seizure prophylaxis.     Signed By: Caitlin Caceres MD     2021

## 2021-10-01 NOTE — PROGRESS NOTES
1215 Pt has visitor at bedside, pt states she has abdominal pain/cramping, rates her pain 7-8, IV Dilaudid given    1230 Pt sleeping    1300 Mag decreased to 1 mg, 50 cc removed from Bakri per order from [facility], CNM    1415 Bakri deflated and removed per order from [facility], CN, no active bleeding noted, gentle palpation of uterus feels firm, language line and  #[id number] used to discuss plan of care and needs of patient

## 2021-10-01 NOTE — PROGRESS NOTES
0730 Bedside shift change report given to RICHARD Spence and JO-ANN Velazquez (oncoming nurse) by Nay Rizvi (offgoing nurse). Report included the following information SBAR, Kardex, Procedure Summary, Intake/Output, MAR and Recent Results. 0800 patient intake completed via Swahili  bedside    0900 50cc removed from Bakri by JO-ANN Velazquez per order from Dr. Chris Dudleying removed from Bakri by JO-ANN Velazquez per order from Dr. Matias Hughes    1700 JO-ANN Velazquez and RICHARD Solomon discussed patient's wishes for the baby. Patient stated she wants a private burial and will ask the  for the location details. The patient consents to genetic testing and autopsy. Jimmie explained what happened in the placental abruption. Patient would like to spend time with baby with RICHARD Solomon present. She would like the  to bless the baby bedside. Patient will eat dinner and then proceed with moving baby in room. 1815 Magnesium stopped and LR new bag started.

## 2021-10-01 NOTE — PROGRESS NOTES
Pt awake and resting in bed. Labs drawn by RN-  Magnesium to be turned off at the 24 hour barbara approx 1815-    Once removed - if labs stable and out put adequate plan to remove casas and assist pt up to BR to be cleaned up.      Shalom Asp, CNM

## 2021-10-01 NOTE — PROGRESS NOTES
Postpartum Note  S  , IUFD -   PPday #1    Pt complains of HA and moderate cramping   Denies VC or RUQ pain  Per nurses pt was able to sleep last night  Baby not in room   Pt appropriately tearful about baby  Expresses gratitude for care given       O: VSS, Afebrile       Patient Vitals for the past 24 hrs:   Temp Pulse Resp BP SpO2   10/01/21 0800 98.7 °F (37.1 °C) 88 14 109/69 95 %   10/01/21 0700 -- 97 14 113/68 98 %   10/01/21 0600 -- 89 14 107/64 94 %   10/01/21 0500 -- 90 14 107/61 93 %   10/01/21 0400 98.7 °F (37.1 °C) 97 14 (!) 100/55 95 %   10/01/21 0300 -- (!) 111 14 (!) 95/55 94 %   10/01/21 0200 -- (!) 108 15 (!) 106/53 96 %   10/01/21 0100 -- (!) 103 16 (!) 107/52 97 %   10/01/21 0000 -- 94 14 (!) 109/52 96 %   21 2304 -- 77 16 117/63 99 %   21 -- 73 16 111/71 99 %   21 -- 70 16 124/70 99 %   21 -- 70 16 120/73 98 %   219 98.3 °F (36.8 °C) 73 16 126/78 98 %   21 -- 70 14 118/72 --   21 -- -- -- -- 96 %   21 -- -- -- -- 96 %   21 -- 70 14 131/80 --   21 -- -- -- -- 97 %   21 -- 71 14 113/72 --   21 -- -- -- -- 96 %   21 -- -- -- -- 98 %   21 -- 78 15 126/81 --   09/30/21 1832 -- -- -- -- 98 %   21 1823 -- 85 -- 135/64 97 %   21 1818 -- -- -- -- 99 %   21 1813 -- 80 15 134/71 99 %   21 1808 -- -- -- -- 98 %   21 1803 -- 71 15 135/74 98 %   21 1758 -- -- -- -- 98 %   21 1753 -- 87 15 130/69 98 %   21 1748 -- -- -- -- 100 %   21 1743 -- 75 -- 137/72 97 %   21 1738 -- -- -- -- 97 %   21 1733 -- 71 15 138/67 97 %   21 1724 -- 71 15 139/83 --   21 1723 -- -- -- -- 99 %   21 1718 -- -- -- -- 98 %   21 1713 -- 72 -- (!) 153/74 96 %   21 1708 -- -- -- -- 97 %   21 1703 -- 67 -- (!) 158/88 97 %   21 1658 -- -- -- -- 98 %   21 1653 -- 78 -- (!) 164/93 98 %   09/30/21 1643 -- 76 -- (!) 177/94 99 %   09/30/21 1638 -- -- -- -- 97 %   09/30/21 1633 -- -- -- -- 96 %   09/30/21 1628 -- -- -- -- 95 %   09/30/21 1627 -- 70 -- (!) 145/88 --   09/30/21 1623 -- -- -- -- 95 %   09/30/21 1612 -- 71 -- (!) 141/89 --   09/30/21 1557 -- 74 -- (!) 150/86 --   09/30/21 1542 -- 71 -- (!) 151/90 --   09/30/21 1528 -- 86 -- (!) 164/90 91 %   09/30/21 1505 98.6 °F (37 °C) 85 16 (!) 145/63 98 %   09/30/21 1226 -- 70 -- (!) 158/105 --   09/30/21 1216 -- 74 -- (!) 164/109 --   09/30/21 1214 -- 72 -- (!) 143/92 --   09/30/21 1126 -- 85 -- (!) 171/97 --   09/30/21 1113 98.3 °F (36.8 °C) -- -- -- --   09/30/21 1110 -- 81 -- (!) 158/109 --   09/30/21 1055 -- 77 -- (!) 142/95 --   09/30/21 1043 -- 81 -- -- --   09/30/21 1041 -- 81 -- (!) 143/96 --   09/30/21 1027 97.6 °F (36.4 °C) 80 15 (!) 151/88 100 %   09/30/21 1026 -- 81 -- (!) 151/88 --   09/30/21 1010 97.7 °F (36.5 °C) 89 15 139/81 98 %           Breasts soft, NT        FF @ U ML, minimal amount of drainage from ceci         Perineum Intact        Ext NT, No REP, Neg Manolo's    A/P     Sante for assessment  Becerra remains in place draining clear light yellow urine >30 ml / hr  HA this morning   Moderate uterine cramping this am     Tylenol give PO - pt tolerating PO meds- if she is still uncomfortable in 45 mins she will let the nurses know    Continue magnesium therapy until 24 hours post partum   Repeat lab values this am   Consider D/C if stable in 24-48 hours     Collaborative management with Allcj 37 to decrease ceci by 50 ml this am and assess blood loss - if stable plan to continue to decrease over day - plan removal later today if remains stable     Continue routine post partum care      Boris Bauer CNM

## 2021-10-02 LAB
ALBUMIN SERPL-MCNC: 1.8 G/DL (ref 3.5–5)
ALBUMIN SERPL-MCNC: 2 G/DL (ref 3.5–5)
ALBUMIN/GLOB SERPL: 0.6 {RATIO} (ref 1.1–2.2)
ALBUMIN/GLOB SERPL: 0.6 {RATIO} (ref 1.1–2.2)
ALP SERPL-CCNC: 152 U/L (ref 45–117)
ALP SERPL-CCNC: 153 U/L (ref 45–117)
ALT SERPL-CCNC: 14 U/L (ref 12–78)
ALT SERPL-CCNC: 19 U/L (ref 12–78)
ANION GAP SERPL CALC-SCNC: 4 MMOL/L (ref 5–15)
ANION GAP SERPL CALC-SCNC: 5 MMOL/L (ref 5–15)
AST SERPL-CCNC: 19 U/L (ref 15–37)
AST SERPL-CCNC: 26 U/L (ref 15–37)
BILIRUB SERPL-MCNC: 0.3 MG/DL (ref 0.2–1)
BILIRUB SERPL-MCNC: 0.4 MG/DL (ref 0.2–1)
BUN SERPL-MCNC: 10 MG/DL (ref 6–20)
BUN SERPL-MCNC: 12 MG/DL (ref 6–20)
BUN/CREAT SERPL: 14 (ref 12–20)
BUN/CREAT SERPL: 18 (ref 12–20)
CALCIUM SERPL-MCNC: 7.1 MG/DL (ref 8.5–10.1)
CALCIUM SERPL-MCNC: 7.9 MG/DL (ref 8.5–10.1)
CHLORIDE SERPL-SCNC: 113 MMOL/L (ref 97–108)
CHLORIDE SERPL-SCNC: 113 MMOL/L (ref 97–108)
CO2 SERPL-SCNC: 24 MMOL/L (ref 21–32)
CO2 SERPL-SCNC: 24 MMOL/L (ref 21–32)
CREAT SERPL-MCNC: 0.68 MG/DL (ref 0.55–1.02)
CREAT SERPL-MCNC: 0.72 MG/DL (ref 0.55–1.02)
ERYTHROCYTE [DISTWIDTH] IN BLOOD BY AUTOMATED COUNT: 15.3 % (ref 11.5–14.5)
ERYTHROCYTE [DISTWIDTH] IN BLOOD BY AUTOMATED COUNT: 15.4 % (ref 11.5–14.5)
GLOBULIN SER CALC-MCNC: 3.1 G/DL (ref 2–4)
GLOBULIN SER CALC-MCNC: 3.2 G/DL (ref 2–4)
GLUCOSE SERPL-MCNC: 83 MG/DL (ref 65–100)
GLUCOSE SERPL-MCNC: 90 MG/DL (ref 65–100)
HCT VFR BLD AUTO: 21.3 % (ref 35–47)
HCT VFR BLD AUTO: 25.4 % (ref 35–47)
HGB BLD-MCNC: 7.1 G/DL (ref 11.5–16)
HGB BLD-MCNC: 8.5 G/DL (ref 11.5–16)
HISTORY CHECKED?,CKHIST: NORMAL
LDH SERPL L TO P-CCNC: 253 U/L (ref 81–246)
LDH SERPL L TO P-CCNC: 263 U/L (ref 81–246)
MCH RBC QN AUTO: 26.8 PG (ref 26–34)
MCH RBC QN AUTO: 27.3 PG (ref 26–34)
MCHC RBC AUTO-ENTMCNC: 33.3 G/DL (ref 30–36.5)
MCHC RBC AUTO-ENTMCNC: 33.5 G/DL (ref 30–36.5)
MCV RBC AUTO: 80.4 FL (ref 80–99)
MCV RBC AUTO: 81.7 FL (ref 80–99)
NRBC # BLD: 0.02 K/UL (ref 0–0.01)
NRBC # BLD: 0.02 K/UL (ref 0–0.01)
NRBC BLD-RTO: 0.3 PER 100 WBC
NRBC BLD-RTO: 0.3 PER 100 WBC
PLATELET # BLD AUTO: 70 K/UL (ref 150–400)
PLATELET # BLD AUTO: 80 K/UL (ref 150–400)
PMV BLD AUTO: 10.1 FL (ref 8.9–12.9)
PMV BLD AUTO: 10.5 FL (ref 8.9–12.9)
POTASSIUM SERPL-SCNC: 4.1 MMOL/L (ref 3.5–5.1)
POTASSIUM SERPL-SCNC: 4.1 MMOL/L (ref 3.5–5.1)
PROT SERPL-MCNC: 4.9 G/DL (ref 6.4–8.2)
PROT SERPL-MCNC: 5.2 G/DL (ref 6.4–8.2)
RBC # BLD AUTO: 2.65 M/UL (ref 3.8–5.2)
RBC # BLD AUTO: 3.11 M/UL (ref 3.8–5.2)
SODIUM SERPL-SCNC: 141 MMOL/L (ref 136–145)
SODIUM SERPL-SCNC: 142 MMOL/L (ref 136–145)
URATE SERPL-MCNC: 7 MG/DL (ref 2.6–6)
URATE SERPL-MCNC: 7.5 MG/DL (ref 2.6–6)
WBC # BLD AUTO: 7.2 K/UL (ref 3.6–11)
WBC # BLD AUTO: 7.6 K/UL (ref 3.6–11)

## 2021-10-02 PROCEDURE — 74011250637 HC RX REV CODE- 250/637: Performed by: ADVANCED PRACTICE MIDWIFE

## 2021-10-02 PROCEDURE — P9016 RBC LEUKOCYTES REDUCED: HCPCS

## 2021-10-02 PROCEDURE — 84550 ASSAY OF BLOOD/URIC ACID: CPT

## 2021-10-02 PROCEDURE — 74011250636 HC RX REV CODE- 250/636: Performed by: OBSTETRICS & GYNECOLOGY

## 2021-10-02 PROCEDURE — 36430 TRANSFUSION BLD/BLD COMPNT: CPT

## 2021-10-02 PROCEDURE — 83615 LACTATE (LD) (LDH) ENZYME: CPT

## 2021-10-02 PROCEDURE — 85027 COMPLETE CBC AUTOMATED: CPT

## 2021-10-02 PROCEDURE — 74011250636 HC RX REV CODE- 250/636: Performed by: ADVANCED PRACTICE MIDWIFE

## 2021-10-02 PROCEDURE — 65270000029 HC RM PRIVATE

## 2021-10-02 PROCEDURE — 36415 COLL VENOUS BLD VENIPUNCTURE: CPT

## 2021-10-02 PROCEDURE — 2709999900 HC NON-CHARGEABLE SUPPLY

## 2021-10-02 PROCEDURE — 80053 COMPREHEN METABOLIC PANEL: CPT

## 2021-10-02 RX ORDER — SODIUM CHLORIDE 9 MG/ML
250 INJECTION, SOLUTION INTRAVENOUS AS NEEDED
Status: DISCONTINUED | OUTPATIENT
Start: 2021-10-02 | End: 2021-10-04 | Stop reason: HOSPADM

## 2021-10-02 RX ADMIN — HYDROCODONE BITARTRATE AND ACETAMINOPHEN 1 TABLET: 5; 325 TABLET ORAL at 21:53

## 2021-10-02 RX ADMIN — SODIUM CHLORIDE, SODIUM LACTATE, POTASSIUM CHLORIDE, AND CALCIUM CHLORIDE 125 ML/HR: 600; 310; 30; 20 INJECTION, SOLUTION INTRAVENOUS at 10:05

## 2021-10-02 RX ADMIN — HYDROCODONE BITARTRATE AND ACETAMINOPHEN 1 TABLET: 5; 325 TABLET ORAL at 10:05

## 2021-10-02 RX ADMIN — SODIUM CHLORIDE, SODIUM LACTATE, POTASSIUM CHLORIDE, AND CALCIUM CHLORIDE 125 ML/HR: 600; 310; 30; 20 INJECTION, SOLUTION INTRAVENOUS at 21:53

## 2021-10-02 RX ADMIN — SODIUM CHLORIDE 250 ML: 9 INJECTION, SOLUTION INTRAVENOUS at 12:07

## 2021-10-02 NOTE — PROGRESS NOTES
Postpartum Note    S/P , PPD#2  Becerra remains in place  Pt states she is too weak to stand and walk        CBC W/O DIFF    Collection Time: 10/02/21  5:29 AM   Result Value Ref Range    WBC 7.6 3.6 - 11.0 K/uL    RBC 2.65 (L) 3.80 - 5.20 M/uL    HGB 7.1 (L) 11.5 - 16.0 g/dL    HCT 21.3 (L) 35.0 - 47.0 %    MCV 80.4 80.0 - 99.0 FL    MCH 26.8 26.0 - 34.0 PG    MCHC 33.3 30.0 - 36.5 g/dL    RDW 15.3 (H) 11.5 - 14.5 %    PLATELET 70 (L) 263 - 400 K/uL    MPV 10.5 8.9 - 12.9 FL    NRBC 0.3 (H) 0  WBC    ABSOLUTE NRBC 0.02 (H) 0.00 - 0.01 K/uL     Reviewed CBC and current status with Magaly Rouse for collaborative care-     O:  A,A&O x 3        VSS, Afebrile       Patient Vitals for the past 24 hrs:   Temp Pulse Resp BP SpO2   10/02/21 0843 98.8 °F (37.1 °C) 76 14 132/77 --   10/02/21 0500 98.5 °F (36.9 °C) 71 16 125/76 95 %   10/02/21 0400 -- 78 -- (!) 141/70 --   10/02/21 0200 -- 79 -- 115/64 93 %   10/02/21 0100 -- 85 -- (!) 111/57 96 %   10/02/21 0000 -- 82 18 120/64 93 %   10/01/21 2300 -- 79 -- 120/73 94 %   10/01/21 2200 -- 76 -- 122/75 95 %   10/01/21 2100 98.1 °F (36.7 °C) (!) 102 18 129/76 98 %   10/01/21 2000 -- 72 -- 122/78 95 %   10/01/21 1700 -- 79 14 118/73 94 %   10/01/21 1500 -- 73 14 (!) 97/55 93 %   10/01/21 1400 -- 78 14 (!) 92/55 95 %   10/01/21 1300 -- 73 16 (!) 97/55 91 %   10/01/21 1200 -- 82 14 113/67 96 %          Breasts soft, NT       Abd soft, NT/ND       FF@ U-2, ML       Scant Lochia Rubra       Perineum Intact       Ext without REP, Neg Manolo's    A/P:   Routine PP care  Collaborative decision to Transfuse one unit PRBCs - pt is not recovering as well as expected HGB trending ankita slowly. PLTs increasing -  Repeat CBC, CMP, uric acid, LD- 4 hours after transfusion.      Jon Carpenter CNM

## 2021-10-02 NOTE — PROGRESS NOTES
0730 Bedside and Verbal shift change report given to GEMMA Sigala RN (oncoming nurse) by JUD Marie RN (offgoing nurse). Report included the following information SBAR, Kardex, Procedure Summary, Intake/Output, MAR and Recent Results. [address] used. Patient would like pain medicine when she finishes breakfast.    3010 RN called to room. Patient removed left hand IV by accident. Site cleaned. New dressing placed. Shaka Newell at bedside discussing plan of care. 1225 Unit PRBC started    1330 Notified Success Finger CNM of elevated BPs. Instructed to call Dr. Adalid Chaidez at this time (CNM in a delivery with another patient). 46 Notified Dr. Adalid Chaidez of patient's condition. Instructed to continue blood transfusion but to decrease rate to 75 ml/hr. Dr. Adalid Chaidez will consider antihypertensives if BP become severe range. 1815 Deepika-care completed and pad changed. Offered to assist patient to chair to eat dinner. Patient declined. 1930 Bedside and Verbal shift change report given to JUD Marie RN (oncoming nurse) by Roxann Thomas RN (offgoing nurse). Report included the following information SBAR, Kardex, Procedure Summary, Intake/Output, MAR and Recent Results.

## 2021-10-02 NOTE — PROGRESS NOTES
Primary RN called to inform of hypertensive events with blood transfusion. Orders to decrease rate of transfusion and treat for severe range pressures.      Pt seems to be tolerating well -     Christine Ferrari CNM

## 2021-10-02 NOTE — PROGRESS NOTES
1150 [facility] at bedside, obtained lunch and dinner order. Conversation facilitated by [employer] ID #[id number].

## 2021-10-02 NOTE — PROGRESS NOTES
@ 2020  Lelo with pastoral care here with patient. Blessings offered and baby given blessings as well. Using audio  (via video  but no video access at this time). Patient wishes for baby to stay in the [facility] room overnight.    @1342 Patient sitting up eating dinner. Will give pain medications after she finishes. @5 Went in to help get patient up to chair, to move around. Patient sleeping soundly with lights off and sound machine off. Will try to get her up before labs at 0500 or will do at that time. @9355 RN at bedside to draw lab work. Using video/audio .  number [id number] \"Omayra\". @5906 Labs drawn and sent    @0530 Patient up to sit on side of bed with assist of nurse.  still present via tele-service as RN explains and patient can answer. Patient states she is feeling dizzy, not able to keep eyes open. Quick bed-bath given while patient remains seated on side of bed, she was able to participate and wash her face. Will not have patient stand at this time. Will try again at shift change.

## 2021-10-02 NOTE — PROGRESS NOTES
responded to request for baby blessing. Pt's RN and I were able to get the  connected. Provided pastoral presence, blessing and prayer. Let her know of  support and availability. Please contact [phone] [facility] for further support.      [address] Cornelia [place], MACE   [phone])

## 2021-10-03 LAB
ABO + RH BLD: NORMAL
BLD PROD TYP BPU: NORMAL
BLOOD GROUP ANTIBODIES SERPL: NORMAL
BPU ID: NORMAL
CROSSMATCH RESULT,%XM: NORMAL
SPECIMEN EXP DATE BLD: NORMAL
STATUS OF UNIT,%ST: NORMAL
UNIT DIVISION, %UDIV: 0

## 2021-10-03 PROCEDURE — 75410000000 HC DELIVERY VAGINAL/SINGLE

## 2021-10-03 PROCEDURE — 77030011825 HC SUPP SURG PSTOP S2SG -B

## 2021-10-03 PROCEDURE — 75410000002 HC LABOR FEE PER 1 HR

## 2021-10-03 PROCEDURE — 74011250637 HC RX REV CODE- 250/637: Performed by: ADVANCED PRACTICE MIDWIFE

## 2021-10-03 PROCEDURE — 65410000002 HC RM PRIVATE OB

## 2021-10-03 PROCEDURE — 75410000003 HC RECOV DEL/VAG/CSECN EA 0.5 HR

## 2021-10-03 RX ORDER — CETIRIZINE HCL 10 MG
10 TABLET ORAL DAILY
Qty: 30 TABLET | Refills: 1 | Status: SHIPPED | OUTPATIENT
Start: 2021-10-03 | End: 2021-10-27

## 2021-10-03 RX ORDER — HYDROCODONE BITARTRATE AND ACETAMINOPHEN 5; 325 MG/1; MG/1
1 TABLET ORAL
Qty: 10 TABLET | Refills: 0 | Status: SHIPPED | OUTPATIENT
Start: 2021-10-03 | End: 2021-10-08 | Stop reason: SDUPTHER

## 2021-10-03 RX ORDER — LANOLIN ALCOHOL/MO/W.PET/CERES
1 CREAM (GRAM) TOPICAL
Status: DISCONTINUED | OUTPATIENT
Start: 2021-10-04 | End: 2021-10-04 | Stop reason: HOSPADM

## 2021-10-03 RX ORDER — CETIRIZINE HCL 10 MG
10 TABLET ORAL DAILY
Status: DISCONTINUED | OUTPATIENT
Start: 2021-10-04 | End: 2021-10-04 | Stop reason: HOSPADM

## 2021-10-03 RX ADMIN — HYDROCODONE BITARTRATE AND ACETAMINOPHEN 1 TABLET: 5; 325 TABLET ORAL at 07:01

## 2021-10-03 RX ADMIN — HYDROCODONE BITARTRATE AND ACETAMINOPHEN 1 TABLET: 5; 325 TABLET ORAL at 13:08

## 2021-10-03 NOTE — DISCHARGE SUMMARY
Obstetrical Discharge Summary     Name: Erma Dickey MRN: [id number]  SSN: [id number]    YOB: 1995  Age: 31 y.o. Sex: female      Allergies: Patient has no known allergies. Admit Date: 2021    Discharge Date: 10/3/2021     Admitting Physician: Koffi Floyd MD     Attending Physician:  MD Talib Gabriel CN      * Admission Diagnoses: Placental abruption [O45.90]    * Discharge Diagnoses:   Information for the patient's :  Baby Christina [[id number]]   Delivery of a 5 lb 5.9 oz (2.435 kg) male infant via Vaginal, Spontaneous on 2021 at 1:54 PM  by Talib Nugent. Apgars were 0  and 0 . Additional Diagnoses:   Hospital Problems as of 10/3/2021 Date Reviewed: 2021        Codes Class Noted - Resolved POA    Placental abruption ICD-10-CM: O45.90  ICD-9-CM: 641.20  2021 - Present Unknown             Lab Results   Component Value Date/Time    ABO/Rh(D) O POSITIVE 2021 04:28 AM    GrBStrep, External NEGATIVE 2021 12:00 AM    ABO,Rh O positive 2021 12:00 AM    ABO,Rh O Positive 2021 12:00 AM      Immunization History   Administered Date(s) Administered    Hep B Vaccine 2019, 07/10/2019, 2020    Influenza Vaccine 2020    Influenza Vaccine (Quad) PF (>6 Mo Flulaval, Fluarix, and >3 Yrs Afluria, Fluzone 93694) 2020    MMR 2019, 07/10/2019    Td 2019, 07/10/2019    Tdap 2020, 08/10/2021       * Procedures: , PPH, DIC, mass transfusion protocol, IUFD  Procedure(s):  VAGINAL EXAMINATION UNDER ANESTHESIA           * Discharge Condition: stable    * Hospital Course: Postpartum course was complicated by acute blood loss anemia, postpartum hemorrhage. DIC, mass transfusion protocol, IUFD which added 2 days to the patient's length of stay.       * Disposition: Home    Discharge Medications:   Current Discharge Medication List          * Follow-up Care/Patient Instructions:   Activity: Activity as tolerated  Diet: Regular Diet  Wound Care: None needed    Follow-up Information    None            Anson Cockayne, CNM

## 2021-10-03 NOTE — PROGRESS NOTES
0730 Bedside and Verbal shift change report given to GEMMA Campbell RN (oncoming nurse) by JUD Saucedo RN (offgoing nurse). Report included the following information SBAR, Kardex, Procedure Summary, Intake/Output, MAR and Recent Results. 0930 Two person assist to bedside commode. Patient voided large amount without difficulty. Deepika care completed. Pad changed. One person assist from [facility] to chair for patient to eat breakfast. Call bell in reach. Patient tolerated well. [address] [facility] # [id number]-- ELLIE Carmona Stalling at bedside, discussing plan of care. All questions answered. 1205 One person assist to bedside commode. Patient voided large amount. Deepika-care completed and pad changed. One person assist to wheelchair to transfer to [facility].    1830 [facility] REPORT:    Verbal report given to JO-ANN Ortega RN(name) on [employer]  being transferred to [employer]) for routine progression of care       Report consisted of patients Situation, Background, Assessment and   Recommendations(SBAR). Information from the following report(s) SBAR, Kardex, Procedure Summary, Intake/Output, MAR and Recent Results was reviewed with the receiving nurse.     Lines:   Peripheral IV 09/30/21 Anterior;Right Antecubital (Active)   Site Assessment Clean, dry, & intact 10/03/21 0837   Phlebitis Assessment 0 10/03/21 0837   Infiltration Assessment 0 10/03/21 0837   Dressing Status Clean, dry, & intact 10/03/21 0837   Dressing Type Tape;Transparent 10/03/21 0837   Hub Color/Line Status Pink;Capped 10/03/21 0837   Alcohol Cap Used Yes 10/03/21 0837       Peripheral IV 09/30/21 Anterior;Right Forearm (Active)   Site Assessment Clean, dry, & intact 10/03/21 0837   Phlebitis Assessment 0 10/03/21 0837   Infiltration Assessment 0 10/03/21 0837   Dressing Status Clean, dry, & intact 10/03/21 0837   Dressing Type Tape;Transparent 10/03/21 0837   Hub Color/Line Status Pink;Capped 10/03/21 0837   Alcohol Cap Used Yes 10/03/21 0837 Opportunity for questions and clarification was provided.       Patient transported with:   Registered Nurse

## 2021-10-03 NOTE — PROGRESS NOTES
@[phone] [facility]     @[phone] [facility],  #[id number] on the line. Patient states that her pain is better. Would like another dose of medication prior to going to bed. Vargas Porter, second RN at bedside to help get patient up to chair. Patient able to stand and walk a few steps, with much effort and help, to rocking chair. States she is tired and dizzy. Bed changed while patient sat in rocking chair. Emphasized the importance of trying to move and get out of bed more so that she can begin to heal and go home, and her casas catheter can come out. Patient verbalized understanding. Patient assisted back to bed at 2140. Will get patient up in the morning and will remove catheter at that time. Plan reviewed with Jair Mccall CNM who agrees. @[phone] [facility],  #[phone] on the line. Discussing with patient the removal of her casas catheter. Patient verbalizes understanding. Casas catheter removed. Pericare performed. Explained that now, patient will need to get up to use bedside commode and will need to call nurse for help. Patient verbalized understanding. Patient states she has some pain in her breasts from her milk coming in. Discussed ways to help ease that discomfort and help her milk to dry up (tight sports bra, decreased stimulation of breasts, cold cabbage leaves, ice to breasts). Patient verbalized understanding. Will give pain mediation as well as apply ice to bilateral breasts. Will try to get a surgical bra from [facility]. Will try to find information in available in Swahili.

## 2021-10-03 NOTE — PROGRESS NOTES
Postpartum Note    S/P , PPD#3  Ambulating is going slow, pt continues to be very dizzy upon standing, she has not been up unassisted as of yet. Minimal assistance last try and was up in a chair for 1 hour.    Some SOB upon getting up- but resolved quickly   Becerra out this morning voiding without diff  More po and po meds well    Pt waiting on  to say final good bye to baby  States she is feeling better overall       O:  A,A&O x 3        VSS, Afebrile       Patient Vitals for the past 24 hrs:   Temp Pulse Resp BP SpO2   10/03/21 0832 98.2 °F (36.8 °C) 67 14 129/72 --   10/03/21 0158 -- 75 16 125/82 --   10/02/21 2117 98.5 °F (36.9 °C) 84 16 135/84 99 %   10/02/21 1627 98 °F (36.7 °C) 78 14 135/80 99 %   10/02/21 1528 98.5 °F (36.9 °C) 71 14 (!) 157/89 98 %   10/02/21 1427 98.4 °F (36.9 °C) 75 14 (!) 134/91 97 %   10/02/21 1327 98.4 °F (36.9 °C) 83 16 (!) 154/86 97 %   10/02/21 1255 98.7 °F (37.1 °C) 78 14 (!) 146/91 97 %   10/02/21 1241 98.1 °F (36.7 °C) 76 14 130/81 96 %   10/02/21 1217 97.8 °F (36.6 °C) 84 14 126/82 (!) 87 %          Breasts soft, NT       Abd soft, NT/ND       FF@ U-1, ML       Scant Lochia Rubra       Perineum Intact       Ext without REP, Neg Manolo's    A/P: Routine PP care          Discharge home  tomrrow as long as she continues to improve          RTO 1-2 wks for PP exam, sooner prn         Shalom Asp, CNM

## 2021-10-04 VITALS
DIASTOLIC BLOOD PRESSURE: 82 MMHG | WEIGHT: 187.39 LBS | HEIGHT: 61 IN | BODY MASS INDEX: 35.38 KG/M2 | SYSTOLIC BLOOD PRESSURE: 134 MMHG | HEART RATE: 75 BPM | RESPIRATION RATE: 16 BRPM | OXYGEN SATURATION: 99 % | TEMPERATURE: 99.1 F

## 2021-10-04 LAB
ERYTHROCYTE [DISTWIDTH] IN BLOOD BY AUTOMATED COUNT: 15 % (ref 11.5–14.5)
HCT VFR BLD AUTO: 26.3 % (ref 35–47)
HGB BLD-MCNC: 8.6 G/DL (ref 11.5–16)
MCH RBC QN AUTO: 26.9 PG (ref 26–34)
MCHC RBC AUTO-ENTMCNC: 32.7 G/DL (ref 30–36.5)
MCV RBC AUTO: 82.2 FL (ref 80–99)
NRBC # BLD: 0.05 K/UL (ref 0–0.01)
NRBC BLD-RTO: 0.9 PER 100 WBC
PLATELET # BLD AUTO: 95 K/UL (ref 150–400)
PMV BLD AUTO: 10.8 FL (ref 8.9–12.9)
RBC # BLD AUTO: 3.2 M/UL (ref 3.8–5.2)
WBC # BLD AUTO: 5.9 K/UL (ref 3.6–11)

## 2021-10-04 PROCEDURE — 85027 COMPLETE CBC AUTOMATED: CPT

## 2021-10-04 PROCEDURE — 74011250637 HC RX REV CODE- 250/637: Performed by: ADVANCED PRACTICE MIDWIFE

## 2021-10-04 PROCEDURE — 36415 COLL VENOUS BLD VENIPUNCTURE: CPT

## 2021-10-04 RX ADMIN — HYDROCODONE BITARTRATE AND ACETAMINOPHEN 1 TABLET: 5; 325 TABLET ORAL at 09:06

## 2021-10-04 RX ADMIN — CETIRIZINE HYDROCHLORIDE 10 MG: 10 TABLET, FILM COATED ORAL at 09:06

## 2021-10-04 RX ADMIN — FERROUS SULFATE TAB 325 MG (65 MG ELEMENTAL FE) 325 MG: 325 (65 FE) TAB at 09:06

## 2021-10-04 NOTE — PROGRESS NOTES
Patient seen by Sharmaine Pires NP CNMW, plan of care: discharge discussed, discharge instructions given opportunity to ask questions provided. Discharge instructions and patient's questions answered via [facility]. [address]  access# [id number].  @10:50 Additional discharge instructions given and questions from pt and pt's  answered via [address] #[id number]. Appointment schedule for , [email or web address] has been setup with RN's assistance, and  and pt. Junaid mathur  # [phone], Other questions regarding [facility]  services, address provided and time,collaborated with Nila Palacio the nursing supervisor  and Aaron Gao RN primary RN yesterday assisted with information regarding [facility]. Alicia  and RN was with patient and  approx 56 minutes answering all their concerns and questions. Pt and  expressed their thanks after the final care encounter.

## 2021-10-04 NOTE — PROGRESS NOTES
Bedside and Verbal shift change report given to Marc Samaniego RN (oncoming nurse) by Jasper Glaser. Noreen Conley RN (offgoing nurse). Report included the following information SBAR, Kardex, Intake/Output, MAR and Recent Results.

## 2021-10-04 NOTE — DISCHARGE SUMMARY
Obstetrical Discharge Summary     Name: Agata Jade MRN: [id number]  SSN: [id number]    YOB: 1995  Age: 31 y.o. Sex: female      Admit Date: 2021    Discharge Date: 10/4/2021     Admitting Physician: Cole Russ MD     Attending Physician:  Marc Mosquera MD     Admission Diagnoses: Placental abruption [O45.90]    Procedure Performed:  Procedure(s):  VAGINAL EXAMINATION UNDER ANESTHESIA  Surgical  Bakri       Discharge Diagnoses:   Information for the patient's :  Damaris Nieto [[id number]]   Delivery of a 5 lb 5.9 oz (2.435 kg) male infant via Vaginal, Spontaneous on 2021 at 1:54 PM  by Kishor King. Apgars were 0  and 0 . Additional Diagnoses:   Hospital Problems  Date Reviewed: 2021        Codes Class Noted POA    Placental abruption ICD-10-CM: O45.90  ICD-9-CM: 641.20  2021 Unknown             Lab Results   Component Value Date/Time    Brennan, External NEGATIVE 2021 12:00 AM       Hospital Course: Postpartum course was complicated by acute blood loss anemia, hypertension and postpartum hemorrhage, which added 2 days to the patient's length of stay. Patient Disposition: Home      Followup Care:  Discharge Condition: stable  No sex for 6 weeks  Regular Diet and Increase noncaffeinated fluids      Patient Instructions:   Current Discharge Medication List      START taking these medications    Details   HYDROcodone-acetaminophen (NORCO) 5-325 mg per tablet Take 1 Tablet by mouth every eight (8) hours as needed for Pain for up to 5 days. Max Daily Amount: 3 Tablets. Qty: 10 Tablet, Refills: 0    Associated Diagnoses: Fetal death affecting management of mother, single or unspecified fetus; Vaginal delivery      cetirizine (ZYRTEC) 10 mg tablet Take 1 Tablet by mouth daily.   Qty: 30 Tablet, Refills: 1         CONTINUE these medications which have NOT CHANGED    Details   prenatal vit-calcium-iron-fa (PRENATAL PLUS with CALCIUM) 27 mg iron- 1 mg tab Take 1 Tablet by mouth daily. Qty: 90 Tablet, Refills: 3      iron, carbonyl (FEOSOL) 45 mg tab Take 1 Tablet by mouth daily. Qty: 30 Tablet, Refills: 3             Reference my discharge instructions. Follow-up Appointments   Procedures    FOLLOW UP VISIT Appointment in: One Week     Standing Status:   Standing     Number of Occurrences:   1     Order Specific Question:   Appointment in     Answer:    One Week        Signed By:  Gem Caceres NP     October 4, 2021

## 2021-10-04 NOTE — PROGRESS NOTES
Postpartum Note    S/P , PPD#4  Ambulating and Voiding without diff  More po and po meds well  Desires discharge home    O:  A,A&O x 3        VSS, Afebrile       Patient Vitals for the past 24 hrs:   Temp Pulse Resp BP SpO2   10/04/21 0757 99.1 °F (37.3 °C) 75 16 134/82 99 %   10/04/21 0051 98.7 °F (37.1 °C) 82 16 135/80 98 %   10/03/21 2043 98.4 °F (36.9 °C) 82 16 128/82 99 %   10/03/21 1240 98.2 °F (36.8 °C) 71 17 (!) 149/86 99 %   10/03/21 1202 98 °F (36.7 °C) 75 16 (!) 144/85 --   10/03/21 0832 98.2 °F (36.8 °C) 67 14 129/72 --          Breasts soft, NT       Abd soft, NT/ND       FF@ U-1, ML       Scant Lochia Rubra       Perineum Intact       Ext without REP, Neg Manolo's    A/P: Routine PP care          Discussed methods to dry up milk          Discharge home in stable cond. RTO 1-2 wks for exam, sooner prn            JULIETH Fernandes/RUDDY

## 2021-10-04 NOTE — DISCHARGE INSTRUCTIONS
Patient Education        ·   When should you call for help? Call 911  anytime you think you may need emergency care. For example, call if:    · You have thoughts of harming yourself, your baby, or another person.     · You passed out (lost consciousness).     · You have chest pain, are short of breath, or cough up blood.     · You have a seizure. Call your doctor now or seek immediate medical care if:    · You have severe vaginal bleeding. This means you are passing blood clots and soaking through a pad each hour for 2 or more hours.     · You are dizzy or lightheaded, or you feel like you may faint.     · You have a fever.     · You have new or more belly pain.     · You have signs of a blood clot in your leg (called a deep vein thrombosis), such as:  ? Pain in the calf, back of the knee, thigh, or groin. ? Redness and swelling in your leg or groin.     · You have signs of preeclampsia, such as:  ? Sudden swelling of your face, hands, or feet. ? New vision problems (such as dimness, blurring, or seeing spots). ? A severe headache. Watch closely for changes in your health, and be sure to contact your doctor if:    · Your vaginal bleeding seems to be getting heavier.     · You have new or worse vaginal discharge.     · You feel sad, anxious, or hopeless for more than a few days.     · You do not get better as expected. Where can you learn more? Go to http://www.[employer].com/  Enter A461 in the search box to learn more about \"After Your Delivery (the Postpartum Period): Care Instructions. \"  Current as of: June 16, 2021               Content Version: 13.0  © 5688-4596 [employer]. Care instructions adapted under license by [employer] (which disclaims liability or warranty for this information).  If you have questions about a medical condition or this instruction, always ask your healthcare professional. [employer] disclaims any warranty or liability for your use of this information. Patient Education        Stillbirth (Before Delivery): Care Instructions  Your Care Instructions     Stillbirth is the loss of a baby after 20 weeks of pregnancy. When a baby dies while still in the womb, this may also be called fetal loss. A doctor may deliver the baby by giving you medicine to start labor. Or you may have a surgical procedure called D&E (dilation and evacuation). The loss of a baby is devastating and very hard to accept. You may wonder why it happened or blame yourself. But fetal loss can happen even during a pregnancy that has been going well. In the weeks to come, try to take care of yourself physically and emotionally. Take care of yourself in whatever way feels best.  Follow-up care is a key part of your treatment and safety. Be sure to make and go to all appointments, and call your doctor if you are having problems. It's also a good idea to know your test results and keep a list of the medicines you take. What happens next? After a fetus dies, labor will usually begin on its own within 2 weeks. Many women don't want to wait that long. They choose to have labor induced. This means going to the hospital and, usually, getting medicine that starts the labor process. If labor doesn't start on its own, your doctor may take steps to get your labor going. · Your doctor may use medicine to soften your cervix and help it begin to open. · Then your doctor probably will give you medicine to start labor and keep your labor going. · You will be given medicine for pain if you need it. After delivery, you will probably be able to see the baby if you want to. Although this can be very hard, some parents want the chance to hold the baby and say goodbye. You will probably go home the next day. Surgery instead of labor   Some women may be able to choose surgery (D&E) instead of going through labor.  Your doctor will discuss whether this is an option for you. Delivery by  section is rare in fetal loss. It is major surgery, so it's only done when going through labor would be more dangerous. Deciding about autopsy   If the exact cause of death isn't known, you may face a decision about whether to have an autopsy. This can be a hard decision. But an autopsy may help you find out why this terrible loss happened to you and whether it could happen again. How can you care for yourself at home? After the delivery, there are things you can do for your physical health and comfort. Taking care of your body   · Use pads instead of tampons for the bloody flow that may last as long as 2 weeks. · Ask your doctor if you can take an over-the-counter pain medicine, such as acetaminophen (Tylenol), ibuprofen (Advil, Motrin), or naproxen (Aleve), to ease cramps. Be safe with medicines. Read and follow all instructions on the label. · Ask your doctor about when it is okay to have sex. Many doctors recommend waiting about 4 to 6 weeks. This gives your body time to heal.  · If your milk has started to come in, talk to your doctor about how to ease discomfort. Dealing with your grief   · Rest whenever you can. Being tired makes it harder to handle your emotions. · Tell your family and friends what they can do. You may want to spend time alone, or you may seek the comfort of family and friends. · Try to eat healthy foods, get some sleep, and get exercise (or just get out of the house) to help you feel strong as you heal.  · Talk to your doctor about how you are coping. He or she will want to watch you for signs of depression. You may want to have counseling for support and to help you express your feelings. · Think about making a memory book of your pregnancy and baby. Many parents name their baby and want to take pictures and keep a lock of hair. The hospital may take photos or footprints for you.  Some parents have a ceremony, such as a christening or other blessing or a  service. · If you can, try to talk to others who have gone through this loss. You can make connections online or in person. Here are some organizations that can help:  ? The Compassionate [employer]. This is a resource for people who have lost a child. The group can help put you in touch with one of its support groups in your area. The website is www.compassionatefriends. org.  ? Share (Pregnancy and Infant Loss [address] [place] [address].). This group can offer advice and connections to others who have lost a child. The group's website is www.Quickflix.org.  ? The International Stillbirth [place]. This group offers information and resources. The website is www.stillbirthalliance.org. When should you call for help? Call 911  anytime you think you may need emergency care. For example, call if:    · You passed out (lost consciousness).     · You have severe vaginal bleeding.     · You have severe pain in your belly or pelvis. Call your doctor now or seek immediate medical care if:    · You have signs of preeclampsia, such as:  ? Sudden swelling of your face, hands, or feet. ? New vision problems (such as dimness, blurring, or seeing spots). ? A severe headache.     · You have a fever.     · You have belly pain or cramping.     · You have any vaginal bleeding.     · You have had regular contractions (with or without pain) for an hour. This means that you have 8 or more within 1 hour or 4 or more in 20 minutes after you change your position and drink fluids.     · You have a sudden release of fluid from your vagina.     · You have low back pain or pelvic pressure that does not go away. Watch closely for changes in your health, and be sure to contact your doctor if you have any problems. Where can you learn more? Go to http://www.gray.com/  Enter B273 in the search box to learn more about \"Stillbirth (Before Delivery): Care Instructions. \"  Current as of:  2021               Content Version: 13.0  © 0697-8341 [employer]. Care instructions adapted under license by [employer] (which disclaims liability or warranty for this information). If you have questions about a medical condition or this instruction, always ask your healthcare professional. [address] any warranty or liability for your use of this information.

## 2021-10-08 ENCOUNTER — OFFICE VISIT (OUTPATIENT)
Dept: OBGYN CLINIC | Age: 26
End: 2021-10-08
Payer: MEDICAID

## 2021-10-08 VITALS
TEMPERATURE: 98.3 F | HEIGHT: 61 IN | WEIGHT: 171 LBS | BODY MASS INDEX: 32.28 KG/M2 | SYSTOLIC BLOOD PRESSURE: 164 MMHG | DIASTOLIC BLOOD PRESSURE: 102 MMHG

## 2021-10-08 DIAGNOSIS — O36.4XX0 FETAL DEATH AFFECTING MANAGEMENT OF MOTHER, SINGLE OR UNSPECIFIED FETUS: ICD-10-CM

## 2021-10-08 DIAGNOSIS — R10.2 PELVIC PAIN: ICD-10-CM

## 2021-10-08 LAB
ALBUMIN SERPL-MCNC: 3 G/DL (ref 3.5–5)
ALBUMIN/GLOB SERPL: 0.7 {RATIO} (ref 1.1–2.2)
ALP SERPL-CCNC: 129 U/L (ref 45–117)
ALT SERPL-CCNC: 29 U/L (ref 12–78)
ANION GAP SERPL CALC-SCNC: 7 MMOL/L (ref 5–15)
AST SERPL-CCNC: 23 U/L (ref 15–37)
BILIRUB SERPL-MCNC: 0.5 MG/DL (ref 0.2–1)
BUN SERPL-MCNC: 17 MG/DL (ref 6–20)
BUN/CREAT SERPL: 25 (ref 12–20)
CALCIUM SERPL-MCNC: 8.6 MG/DL (ref 8.5–10.1)
CHLORIDE SERPL-SCNC: 110 MMOL/L (ref 97–108)
CO2 SERPL-SCNC: 21 MMOL/L (ref 21–32)
CREAT SERPL-MCNC: 0.68 MG/DL (ref 0.55–1.02)
ERYTHROCYTE [DISTWIDTH] IN BLOOD BY AUTOMATED COUNT: 14.6 % (ref 11.5–14.5)
GLOBULIN SER CALC-MCNC: 4.2 G/DL (ref 2–4)
GLUCOSE SERPL-MCNC: 69 MG/DL (ref 65–100)
HCT VFR BLD AUTO: 29.3 % (ref 35–47)
HGB BLD-MCNC: 9 G/DL (ref 11.5–16)
MCH RBC QN AUTO: 26.3 PG (ref 26–34)
MCHC RBC AUTO-ENTMCNC: 30.7 G/DL (ref 30–36.5)
MCV RBC AUTO: 85.7 FL (ref 80–99)
NRBC # BLD: 0.02 K/UL (ref 0–0.01)
NRBC BLD-RTO: 0.4 PER 100 WBC
PLATELET # BLD AUTO: 179 K/UL (ref 150–400)
PMV BLD AUTO: 11.2 FL (ref 8.9–12.9)
POTASSIUM SERPL-SCNC: 4 MMOL/L (ref 3.5–5.1)
PROT SERPL-MCNC: 7.2 G/DL (ref 6.4–8.2)
RBC # BLD AUTO: 3.42 M/UL (ref 3.8–5.2)
SODIUM SERPL-SCNC: 138 MMOL/L (ref 136–145)
WBC # BLD AUTO: 5.2 K/UL (ref 3.6–11)

## 2021-10-08 PROCEDURE — 0502F SUBSEQUENT PRENATAL CARE: CPT | Performed by: ADVANCED PRACTICE MIDWIFE

## 2021-10-08 RX ORDER — LABETALOL 100 MG/1
100 TABLET, FILM COATED ORAL 2 TIMES DAILY
Qty: 30 TABLET | Refills: 0 | Status: SHIPPED | OUTPATIENT
Start: 2021-10-08 | End: 2021-10-15 | Stop reason: SDUPTHER

## 2021-10-08 RX ORDER — HYDROCODONE BITARTRATE AND ACETAMINOPHEN 5; 325 MG/1; MG/1
1 TABLET ORAL
Qty: 10 TABLET | Refills: 0 | Status: SHIPPED | OUTPATIENT
Start: 2021-10-08 | End: 2021-10-13

## 2021-10-08 RX ORDER — AMOXICILLIN AND CLAVULANATE POTASSIUM 875; 125 MG/1; MG/1
1 TABLET, FILM COATED ORAL EVERY 12 HOURS
Qty: 20 TABLET | Refills: 0 | Status: SHIPPED | OUTPATIENT
Start: 2021-10-08 | End: 2021-10-18

## 2021-10-08 NOTE — PROGRESS NOTES
Herminia Santacruz is a 31 y.o. female who is here for follow up from hospital discharge on 10/4/21, following delivery of IUFD and abruption. States she has been had abdominal pain since she left the hospital. She also has right lateral upper leg pain for a couple of days. Her oral temp in office is 98.3. She took her pain meds this morning, out of pain meds      Her relevant past medical history:   Past Medical History:   Diagnosis Date    Routine screening for STI (sexually transmitted infection) 03/16/2020    Health Dept labs: Hep B testing with immunity to hepatitis B (reactive sAb, with negative sAg and total core Ab); HIV non-reactive; T pallidum/syphilis non-reactive; HCV negative. GC/CT negative.  Screening for iron deficiency anemia 03/16/2020    Health Dept labs:  Hgb 11.9 with MCV 77. Remainder of H18 normal.  Chem 8 normal except CO2 18.  Screening for tuberculosis 03/16/2020    Health Dept labs:  T-spot negative. No past surgical history on file. Social History     Occupational History    Not on file   Tobacco Use    Smoking status: Never Smoker    Smokeless tobacco: Never Used   Substance and Sexual Activity    Alcohol use: Not Currently    Drug use: Never    Sexual activity: Not Currently     Family History   Problem Relation Age of Onset    No Known Problems Mother     No Known Problems Father        No Known Allergies  Prior to Admission medications    Medication Sig Start Date End Date Taking? Authorizing Provider   HYDROcodone-acetaminophen (NORCO) 5-325 mg per tablet Take 1 Tablet by mouth every eight (8) hours as needed for Pain for up to 5 days. Max Daily Amount: 3 Tablets. 10/3/21 10/8/21 Yes Javi Beltre CNM   cetirizine (ZYRTEC) 10 mg tablet Take 1 Tablet by mouth daily.   Patient not taking: Reported on 10/8/2021 10/3/21   Yudy Lomeli CNM   prenatal vit-calcium-iron-fa (PRENATAL PLUS with CALCIUM) 27 mg iron- 1 mg tab Take 1 Tablet by mouth daily. Patient not taking: Reported on 10/8/2021 8/10/21   Marcello Arias CNM   iron, carbonyl (FEOSOL) 45 mg tab Take 1 Tablet by mouth daily.   Patient not taking: Reported on 8/10/2021 7/27/21   Marcello Arias CNM        Review of Systems - History obtained from the patient  Constitutional: negative for weight loss, fever, night sweats  HEENT: negative for hearing loss, earache, congestion, snoring, sorethroat  CV: negative for chest pain, palpitations, edema  Resp: negative for cough, shortness of breath, wheezing  Breast: negative for breast lumps, nipple discharge, galactorrhea  GI: negative for change in bowel habits, abdominal pain, black or bloody stools  : negative for frequency, dysuria, hematuria  MSK: negative for back pain, joint pain, muscle pain  Skin: negative for itching, rash, hives  Neuro: negative for dizziness, headache, confusion, weakness  Psych: negative for anxiety, depression, change in mood  Heme/lymph: negative for bleeding, bruising, pallor      Objective:  Visit Vitals  BP (!) 164/102   Ht 5' 1\" (1.549 m)   Wt 171 lb (77.6 kg)   BMI 32.31 kg/m²          PHYSICAL EXAMINATION    Constitutional  · Appearance: well-nourished, well developed, alert, in no acute distress    HENT  · Head and Face: appears normal    Neck  · Inspection/Palpation: normal appearance  ·   Breasts  · Still wrapped tightly to decrease milk production    Gastrointestinal  · Abdominal Examination: abdomen non-tender to palpation, normal bowel sounds, no masses present  · Liver and spleen: no hepatomegaly present, spleen not palpable  · Hernias: no hernias identified    Genitourinary  · External Genitalia: normal appearance for age, no discharge present, no tenderness present, no inflammatory lesions present, no masses present, no atrophy present  · Vagina: normal vaginal vault without central or paravaginal defects, no discharge present, no inflammatory lesions present, no masses present  · Bladder: non-tender to palpation  · Urethra: appears normal  · Cervix: normal, No CMT  · Uterus: normal size, shape and consistency, involuting well, Bleeding less than menses, no odor  · Adnexa: no adnexal tenderness present, no adnexal masses present  · Perineum: perineum within normal limits, no evidence of trauma, no rashes or skin lesions present  · Anus: anus within normal limits, no hemorrhoids present  · Inguinal Lymph Nodes: no lymphadenopathy present    Skin  · General Inspection: no rash, no lesions identified    Neurologic/Psychiatric  · Mental Status:  · Orientation: grossly oriented to person, place and time  · Mood and Affect: mood normal, affect appropriate    Assessment:      Elevated BP after IUFD/Abruption  Abd pain      Plan:   Rx sent for Norco #10  Rx sent for Augmentin BID x 10 days  Rx sent to start Labetalol 100 mg po BID  RTO Monday for recheck  CBC, CMP and Genital Culture collected today    Patient declines presence of chaperone during today's visit. Juancho Hammer.  JULIETH Simno/RUDDY

## 2021-10-08 NOTE — LETTER
10/8/2021 11:35 AM    Ms. Mary Anne Hooker  [place] [phone]      Please excuse Mr. Areli Villalobos from work duties for dates 09/28/21 through 10/08/21 due to spouse hospitalized and needing assistance at home. Also allow him to be absent from work on 10/11/21 for spouse doctor visit.         Sincerely,      Martha Santacruz NP

## 2021-10-11 ENCOUNTER — OFFICE VISIT (OUTPATIENT)
Dept: OBGYN CLINIC | Age: 26
End: 2021-10-11
Payer: MEDICAID

## 2021-10-11 VITALS
BODY MASS INDEX: 32.1 KG/M2 | SYSTOLIC BLOOD PRESSURE: 156 MMHG | HEIGHT: 61 IN | WEIGHT: 170 LBS | DIASTOLIC BLOOD PRESSURE: 102 MMHG

## 2021-10-11 LAB
BACTERIA SPEC CULT: NORMAL
SERVICE CMNT-IMP: NORMAL

## 2021-10-11 PROCEDURE — 0503F POSTPARTUM CARE VISIT: CPT | Performed by: ADVANCED PRACTICE MIDWIFE

## 2021-10-11 NOTE — PROGRESS NOTES
Hodan Pettit is a 31 y.o. female who presents today for a follow up visit from 10/8/21, for pelvic pain and elevated blood pressure. Pt has interpretor with her today. States she is feeling much better. The bleeding is much less. Appears to walk with ease now, where last week she was bent over and moving very slowly. Says she is taking both her antibiotic and her blood pressure medication twice daily. States she took a dose at 7am today. BP this morning in office is 156/102. Her relevant past medical history:   Past Medical History:   Diagnosis Date    Routine screening for STI (sexually transmitted infection) 03/16/2020    Health Dept labs: Hep B testing with immunity to hepatitis B (reactive sAb, with negative sAg and total core Ab); HIV non-reactive; T pallidum/syphilis non-reactive; HCV negative. GC/CT negative.  Screening for iron deficiency anemia 03/16/2020    Health Dept labs:  Hgb 11.9 with MCV 77. Remainder of H18 normal.  Chem 8 normal except CO2 18.  Screening for tuberculosis 03/16/2020    Health Dept labs:  T-spot negative. No past surgical history on file. Social History     Occupational History    Not on file   Tobacco Use    Smoking status: Never Smoker    Smokeless tobacco: Never Used   Substance and Sexual Activity    Alcohol use: Not Currently    Drug use: Never    Sexual activity: Not Currently     Family History   Problem Relation Age of Onset    No Known Problems Mother     No Known Problems Father        No Known Allergies  Prior to Admission medications    Medication Sig Start Date End Date Taking? Authorizing Provider   amoxicillin-clavulanate (AUGMENTIN) 875-125 mg per tablet Take 1 Tablet by mouth every twelve (12) hours for 10 days. 10/8/21 10/18/21 Yes Alexandro Sandifer, NP   HYDROcodone-acetaminophen (NORCO) 5-325 mg per tablet Take 1 Tablet by mouth every eight (8) hours as needed for Pain for up to 5 days. Max Daily Amount: 3 Tablets. 10/8/21 10/13/21 Yes Clint Brasher NP   labetaloL (NORMODYNE) 100 mg tablet Take 1 Tablet by mouth two (2) times a day. 10/8/21  Yes Clint Brasher NP   cetirizine (ZYRTEC) 10 mg tablet Take 1 Tablet by mouth daily. Patient not taking: Reported on 10/8/2021 10/3/21   Charity Hi CNM   prenatal vit-calcium-iron-fa (PRENATAL PLUS with CALCIUM) 27 mg iron- 1 mg tab Take 1 Tablet by mouth daily. Patient not taking: Reported on 10/8/2021 8/10/21   Charity Hi CNM   iron, carbonyl (FEOSOL) 45 mg tab Take 1 Tablet by mouth daily.   Patient not taking: Reported on 8/10/2021 7/27/21   Charity Hi CNM        Review of Systems - History obtained from the patient  Constitutional: negative for weight loss, fever, night sweats  HEENT: negative for hearing loss, earache, congestion, snoring, sorethroat  CV: negative for chest pain, palpitations, edema  Resp: negative for cough, shortness of breath, wheezing  Breast: negative for breast lumps, nipple discharge, galactorrhea  GI: negative for change in bowel habits, abdominal pain, black or bloody stools  : negative for frequency, dysuria, hematuria  MSK: negative for back pain, joint pain, muscle pain  Skin: negative for itching, rash, hives  Neuro: negative for dizziness, headache, confusion, weakness  Psych: negative for anxiety, depression, change in mood  Heme/lymph: negative for bleeding, bruising, pallor      Objective:  Visit Vitals  BP (!) 156/102   Ht 5' 1\" (1.549 m)   Wt 170 lb (77.1 kg)   BMI 32.12 kg/m²          PHYSICAL EXAMINATION    Constitutional  · Appearance: well-nourished, well developed, alert, in no acute distress    HENT  · Head and Face: appears normal    Cardiology   No peripheral edema     Respiratory   Unlabored breathing    Skin  · General Inspection: no rash, no lesions identified    Neurologic/Psychiatric  · Mental Status:  · Orientation: grossly oriented to person, place and time  · Mood and Affect: mood normal, affect appropriate    Assessment:    post partum HTN    Taking 100 mg BID- increase to 200 mg BID- take blood pressure twice daily - if blood pressures 140/90 or higher come in Wednesday for follow up, if blood pressures well controlled may come in Friday- pt has a ride Friday - can get a ride Wednesday if needed     Plan:   Patient declines presence of chaperone during today's visit.        Talib Nugent CNM

## 2021-10-15 ENCOUNTER — OFFICE VISIT (OUTPATIENT)
Dept: OBGYN CLINIC | Age: 26
End: 2021-10-15
Payer: MEDICAID

## 2021-10-15 VITALS — SYSTOLIC BLOOD PRESSURE: 110 MMHG | WEIGHT: 166 LBS | BODY MASS INDEX: 31.37 KG/M2 | DIASTOLIC BLOOD PRESSURE: 82 MMHG

## 2021-10-15 PROCEDURE — 0503F POSTPARTUM CARE VISIT: CPT | Performed by: ADVANCED PRACTICE MIDWIFE

## 2021-10-15 RX ORDER — LABETALOL 100 MG/1
100 TABLET, FILM COATED ORAL 2 TIMES DAILY
Qty: 30 TABLET | Refills: 0 | Status: SHIPPED | OUTPATIENT
Start: 2021-10-15 | End: 2021-10-27 | Stop reason: ALTCHOICE

## 2021-10-15 NOTE — PROGRESS NOTES
Milton Samuel is a 31 y.o. female who presents for follow up of post partum hypertension. She is accompanied by a friend that translates for her. Her last dose of Labetalol was last night, she has no more medication. Her BP is 110/82. She is feeling much better. When seen in office last week orders to increase to 200 mg BID - pt took medication for 2 days at this dose then decreased back to 100 mg BID as she noted her blood pressures were decreasing. Blood pressures have been normotensive since that time on 100 mg BID- she ran out of medication last night - pressure remains normotensive      Her relevant past medical history:   Past Medical History:   Diagnosis Date    Routine screening for STI (sexually transmitted infection) 03/16/2020    Health Dept labs: Hep B testing with immunity to hepatitis B (reactive sAb, with negative sAg and total core Ab); HIV non-reactive; T pallidum/syphilis non-reactive; HCV negative. GC/CT negative.  Screening for iron deficiency anemia 03/16/2020    Health Dept labs:  Hgb 11.9 with MCV 77. Remainder of H18 normal.  Chem 8 normal except CO2 18.  Screening for tuberculosis 03/16/2020    Health Dept labs:  T-spot negative. No past surgical history on file. Social History     Occupational History    Not on file   Tobacco Use    Smoking status: Never Smoker    Smokeless tobacco: Never Used   Substance and Sexual Activity    Alcohol use: Not Currently    Drug use: Never    Sexual activity: Not Currently     Family History   Problem Relation Age of Onset    No Known Problems Mother     No Known Problems Father        No Known Allergies  Prior to Admission medications    Medication Sig Start Date End Date Taking? Authorizing Provider   amoxicillin-clavulanate (AUGMENTIN) 875-125 mg per tablet Take 1 Tablet by mouth every twelve (12) hours for 10 days.  10/8/21 10/18/21  Jaspreet Fontaine NP   labetaloL (NORMODYNE) 100 mg tablet Take 1 Tablet by mouth two (2) times a day. 10/8/21   Petros Dean NP   cetirizine (ZYRTEC) 10 mg tablet Take 1 Tablet by mouth daily. Patient not taking: Reported on 10/8/2021 10/3/21   Shirley Espinoza CNM   prenatal vit-calcium-iron-fa (PRENATAL PLUS with CALCIUM) 27 mg iron- 1 mg tab Take 1 Tablet by mouth daily. Patient not taking: Reported on 10/8/2021 8/10/21   Shirley Espinoza CNM   iron, carbonyl (FEOSOL) 45 mg tab Take 1 Tablet by mouth daily.   Patient not taking: Reported on 8/10/2021 7/27/21   Shirley Espinoza CNM        Review of Systems - History obtained from the patient  Constitutional: negative for weight loss, fever, night sweats  HEENT: negative for hearing loss, earache, congestion, snoring, sorethroat  CV: negative for chest pain, palpitations, edema  Resp: negative for cough, shortness of breath, wheezing  Breast: negative for breast lumps, nipple discharge, galactorrhea  GI: negative for change in bowel habits, abdominal pain, black or bloody stools  : negative for frequency, dysuria, hematuria  MSK: negative for back pain, joint pain, muscle pain  Skin: negative for itching, rash, hives  Neuro: negative for dizziness, headache, confusion, weakness  Psych: negative for anxiety, depression, change in mood  Heme/lymph: negative for bleeding, bruising, pallor      Objective:  Visit Vitals  /82   Wt 166 lb (75.3 kg)   BMI 31.37 kg/m²          PHYSICAL EXAMINATION    Constitutional  · Appearance: well-nourished, well developed, alert, in no acute distress    HENT  · Head and Face: appears normal    Cardiology  · Extremities: No peripheral edema    Respiratory  Unlabored breathing     Skin  · General Inspection: no rash, no lesions identified    Neurologic/Psychiatric  · Mental Status:  · Orientation: grossly oriented to person, place and time  · Mood and Affect: mood normal, affect appropriate    Assessment:    post partum hypertension - take blood pressures every day  Stop medication for 2 days if pressures remain normotensive do not restart medication   If pressures are elevated above 140/90 take 100 mg BID  Follow up in 2 weeks   Family friend at apt to help translate.    Pt has blood pressure cuff at home. ]    Frankie Gould CNM

## 2021-10-27 ENCOUNTER — OFFICE VISIT (OUTPATIENT)
Dept: OBGYN CLINIC | Age: 26
End: 2021-10-27
Payer: MEDICAID

## 2021-10-27 VITALS
WEIGHT: 169 LBS | HEIGHT: 61 IN | SYSTOLIC BLOOD PRESSURE: 130 MMHG | DIASTOLIC BLOOD PRESSURE: 82 MMHG | BODY MASS INDEX: 31.91 KG/M2

## 2021-10-27 PROCEDURE — 0503F POSTPARTUM CARE VISIT: CPT | Performed by: ADVANCED PRACTICE MIDWIFE

## 2021-10-27 RX ORDER — MEDROXYPROGESTERONE ACETATE 150 MG/ML
150 INJECTION, SUSPENSION INTRAMUSCULAR ONCE
Qty: 1 EACH | Refills: 3 | Status: SHIPPED | OUTPATIENT
Start: 2021-10-27 | End: 2021-10-27

## 2021-10-27 RX ORDER — MEDROXYPROGESTERONE ACETATE 150 MG/ML
150 INJECTION, SUSPENSION INTRAMUSCULAR ONCE
Status: DISCONTINUED | OUTPATIENT
Start: 2021-10-27 | End: 2021-10-27

## 2021-10-27 NOTE — PROGRESS NOTES
Milton Samuel is a 31 y.o. female who presents for post partum follow up of blood pressure. She is accompanied by friend to interpret. Pt is feeling very well. Blood pressures at home have been in the 110s over 60s. She is no longer taking blood pressure medication      Her relevant past medical history:   Past Medical History:   Diagnosis Date    Routine screening for STI (sexually transmitted infection) 03/16/2020    Health Dept labs: Hep B testing with immunity to hepatitis B (reactive sAb, with negative sAg and total core Ab); HIV non-reactive; T pallidum/syphilis non-reactive; HCV negative. GC/CT negative.  Screening for iron deficiency anemia 03/16/2020    Health Dept labs:  Hgb 11.9 with MCV 77. Remainder of H18 normal.  Chem 8 normal except CO2 18.  Screening for tuberculosis 03/16/2020    Health Dept labs:  T-spot negative. No past surgical history on file. Social History     Occupational History    Not on file   Tobacco Use    Smoking status: Never Smoker    Smokeless tobacco: Never Used   Substance and Sexual Activity    Alcohol use: Not Currently    Drug use: Never    Sexual activity: Not Currently     Family History   Problem Relation Age of Onset    No Known Problems Mother     No Known Problems Father        No Known Allergies  Prior to Admission medications    Medication Sig Start Date End Date Taking? Authorizing Provider   labetaloL (NORMODYNE) 100 mg tablet Take 1 Tablet by mouth two (2) times a day. 10/15/21   Darcie Roel, CNM   cetirizine (ZYRTEC) 10 mg tablet Take 1 Tablet by mouth daily. Patient not taking: Reported on 10/8/2021 10/3/21   Darcie Roel, CNM   prenatal vit-calcium-iron-fa (PRENATAL PLUS with CALCIUM) 27 mg iron- 1 mg tab Take 1 Tablet by mouth daily. Patient not taking: Reported on 10/8/2021 8/10/21   Darcie Roel, CNM   iron, carbonyl (FEOSOL) 45 mg tab Take 1 Tablet by mouth daily.   Patient not taking: Reported on 8/10/2021 7/27/21   Adama Currie CNM        Review of Systems - History obtained from the patient  Constitutional: negative for weight loss, fever, night sweats  HEENT: negative for hearing loss, earache, congestion, snoring, sorethroat  CV: negative for chest pain, palpitations, edema  Resp: negative for cough, shortness of breath, wheezing  Breast: negative for breast lumps, nipple discharge, galactorrhea  GI: negative for change in bowel habits, abdominal pain, black or bloody stools  : negative for frequency, dysuria, hematuria  MSK: negative for back pain, joint pain, muscle pain  Skin: negative for itching, rash, hives  Neuro: negative for dizziness, headache, confusion, weakness  Psych: negative for anxiety, depression, change in mood  Heme/lymph: negative for bleeding, bruising, pallor      Objective:  Visit Vitals  /82   Ht 5' 1\" (1.549 m)   Wt 169 lb (76.7 kg)   BMI 31.93 kg/m²          PHYSICAL EXAMINATION    Constitutional  · Appearance: well-nourished, well developed, alert, in no acute distress    HENT  · Head and Face: appears normal    Neck  Inspection/Palpation: normal appearance    Cardiology   Normal heart rate  Nor peripheal edema    Respiratory   Unlabored breathing   ·     Skin  · General Inspection: no rash, no lesions identified    Neurologic/Psychiatric  · Mental Status:  · Orientation: grossly oriented to person, place and time  · Mood and Affect: mood normal, affect appropriate    Assessment/ plan:     Pt doing well - no longer taking blood pressure medication   Desires work note to return when able - 6 week note provided if pt needs additional time 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 Patient declines presence of chaperone during today's visit.         Eze Choi Ursula Cogan

## 2021-10-27 NOTE — LETTER
10/27/2021 12:23 PM    Ms. Tio Martinez  [address] [id number]      Post partum instructions    No intercourse for 6 weeks post partum- No sooner than November 11, 2021  Follow up for well woman in 3 months.          Sincerely,      Lidia French CNM

## 2021-10-27 NOTE — LETTER
10/27/2021 12:22 PM    Ms. Christina Man  [address]  [place] [address]      To Whom it may concern,    Pt may return to work after November 11, 2021. She will be cleared after this date.          Sincerely,      Chloe Cuellar CNM

## 2021-11-22 ENCOUNTER — TELEPHONE (OUTPATIENT)
Dept: OBGYN CLINIC | Age: 26
End: 2021-11-22

## 2021-11-22 NOTE — LETTER
11/22/2021 1:43 PM    Ms. Marita Gant  [place] [address]              Sincerely,      Megan Baumgarten, CNM

## 2021-11-22 NOTE — TELEPHONE ENCOUNTER
Justice Pelayo CNM  You 19 minutes ago (1:19 PM)     ERIN    Yes thank you     L-A      HIPPA verified to speak to Keli Mccain regarding patient . .  Ms Erika Hills advised of letter being mailed to patient confirmed address for the back to work note    Rosamond Scheuermann verbalized understanding.

## 2021-11-22 NOTE — TELEPHONE ENCOUNTER
Call received at 12:45PM      Lana Matthews calling on behalf of the patient, HIPPA verified    Patient delivered on 9/30/2021 and was seen in the office on 10/27/2021 for pp visit    Lana Matthews calling to get a letter mailed to the patient with a date she can go back to work      Costa Jean to write the letter to say return to work on 11/12/2021  6 weeks    Please advise    Thank you

## 2021-11-22 NOTE — LETTER
NOTIFICATION RETURN TO WORK / SCHOOL    11/22/2021 12:38 PM    Ms. Ana Granger  [address]  [place] [address]      To Whom It May Concern:    Ana Granger is currently under the care of [address] [place] [address].     She will return to work/school on:11/12/2021    If there are questions or concerns please have the patient contact our office at [phone]        Sincerely,      Urban Townsend CNM

## 2022-03-18 PROBLEM — Z3A.24 24 WEEKS GESTATION OF PREGNANCY: Status: ACTIVE | Noted: 2021-06-28

## 2022-03-19 PROBLEM — O45.90 PLACENTAL ABRUPTION: Status: ACTIVE | Noted: 2021-09-30

## 2022-07-24 ENCOUNTER — HOSPITAL ENCOUNTER (EMERGENCY)
Age: 27
Discharge: HOME | End: 2022-07-24
Attending: STUDENT IN AN ORGANIZED HEALTH CARE EDUCATION/TRAINING PROGRAM
Payer: MEDICAID

## 2022-07-24 ENCOUNTER — APPOINTMENT (OUTPATIENT)
Dept: CT IMAGING | Age: 27
End: 2022-07-24
Attending: EMERGENCY MEDICINE
Payer: MEDICAID

## 2022-07-24 VITALS
OXYGEN SATURATION: 99 % | DIASTOLIC BLOOD PRESSURE: 87 MMHG | RESPIRATION RATE: 18 BRPM | HEART RATE: 66 BPM | TEMPERATURE: 97 F | SYSTOLIC BLOOD PRESSURE: 134 MMHG

## 2022-07-24 DIAGNOSIS — R11.0 NAUSEA WITHOUT VOMITING: ICD-10-CM

## 2022-07-24 DIAGNOSIS — Z34.90 PREGNANCY, UNSPECIFIED GESTATIONAL AGE: Primary | ICD-10-CM

## 2022-07-24 DIAGNOSIS — R51.9 NONINTRACTABLE HEADACHE, UNSPECIFIED CHRONICITY PATTERN, UNSPECIFIED HEADACHE TYPE: ICD-10-CM

## 2022-07-24 LAB
ALBUMIN SERPL-MCNC: 3.3 G/DL (ref 3.5–5)
ALBUMIN/GLOB SERPL: 0.8 {RATIO} (ref 1.1–2.2)
ALP SERPL-CCNC: 51 U/L (ref 45–117)
ALT SERPL-CCNC: 24 U/L (ref 12–78)
ANION GAP SERPL CALC-SCNC: 7 MMOL/L (ref 5–15)
APPEARANCE UR: CLEAR
AST SERPL-CCNC: 9 U/L (ref 15–37)
BACTERIA URNS QL MICRO: ABNORMAL /HPF
BASOPHILS # BLD: 0 K/UL (ref 0–0.1)
BASOPHILS NFR BLD: 1 % (ref 0–1)
BILIRUB SERPL-MCNC: 0.4 MG/DL (ref 0.2–1)
BILIRUB UR QL: NEGATIVE
BUN SERPL-MCNC: 6 MG/DL (ref 6–20)
BUN/CREAT SERPL: 10 (ref 12–20)
CALCIUM SERPL-MCNC: 9.2 MG/DL (ref 8.5–10.1)
CHLORIDE SERPL-SCNC: 108 MMOL/L (ref 97–108)
CO2 SERPL-SCNC: 22 MMOL/L (ref 21–32)
COLOR UR: ABNORMAL
CREAT SERPL-MCNC: 0.59 MG/DL (ref 0.55–1.02)
DIFFERENTIAL METHOD BLD: ABNORMAL
EOSINOPHIL # BLD: 0.1 K/UL (ref 0–0.4)
EOSINOPHIL NFR BLD: 3 % (ref 0–7)
EPITH CASTS URNS QL MICRO: ABNORMAL /LPF
ERYTHROCYTE [DISTWIDTH] IN BLOOD BY AUTOMATED COUNT: 13.6 % (ref 11.5–14.5)
GLOBULIN SER CALC-MCNC: 4.4 G/DL (ref 2–4)
GLUCOSE SERPL-MCNC: 96 MG/DL (ref 65–100)
GLUCOSE UR STRIP.AUTO-MCNC: NEGATIVE MG/DL
HCG UR QL: POSITIVE
HCT VFR BLD AUTO: 35.2 % (ref 35–47)
HGB BLD-MCNC: 11.2 G/DL (ref 11.5–16)
HGB UR QL STRIP: NEGATIVE
IMM GRANULOCYTES # BLD AUTO: 0 K/UL (ref 0–0.04)
IMM GRANULOCYTES NFR BLD AUTO: 0 % (ref 0–0.5)
KETONES UR QL STRIP.AUTO: NEGATIVE MG/DL
LEUKOCYTE ESTERASE UR QL STRIP.AUTO: NEGATIVE
LYMPHOCYTES # BLD: 1.1 K/UL (ref 0.8–3.5)
LYMPHOCYTES NFR BLD: 28 % (ref 12–49)
MCH RBC QN AUTO: 23.8 PG (ref 26–34)
MCHC RBC AUTO-ENTMCNC: 31.8 G/DL (ref 30–36.5)
MCV RBC AUTO: 74.9 FL (ref 80–99)
MONOCYTES # BLD: 0.3 K/UL (ref 0–1)
MONOCYTES NFR BLD: 7 % (ref 5–13)
MUCOUS THREADS URNS QL MICRO: ABNORMAL /LPF
NEUTS SEG # BLD: 2.5 K/UL (ref 1.8–8)
NEUTS SEG NFR BLD: 61 % (ref 32–75)
NITRITE UR QL STRIP.AUTO: NEGATIVE
NRBC # BLD: 0 K/UL (ref 0–0.01)
NRBC BLD-RTO: 0 PER 100 WBC
PH UR STRIP: 6.5 [PH] (ref 5–8)
PLATELET # BLD AUTO: 169 K/UL (ref 150–400)
PMV BLD AUTO: 10.8 FL (ref 8.9–12.9)
POTASSIUM SERPL-SCNC: 3.7 MMOL/L (ref 3.5–5.1)
PROT SERPL-MCNC: 7.7 G/DL (ref 6.4–8.2)
PROT UR STRIP-MCNC: ABNORMAL MG/DL
RBC # BLD AUTO: 4.7 M/UL (ref 3.8–5.2)
RBC #/AREA URNS HPF: ABNORMAL /HPF (ref 0–5)
SARS-COV-2, COV2: NORMAL
SODIUM SERPL-SCNC: 137 MMOL/L (ref 136–145)
SP GR UR REFRACTOMETRY: 1.02 (ref 1–1.03)
UR CULT HOLD, URHOLD: NORMAL
UROBILINOGEN UR QL STRIP.AUTO: 1 EU/DL (ref 0.2–1)
WBC # BLD AUTO: 4 K/UL (ref 3.6–11)
WBC URNS QL MICRO: ABNORMAL /HPF (ref 0–4)

## 2022-07-24 PROCEDURE — 36415 COLL VENOUS BLD VENIPUNCTURE: CPT

## 2022-07-24 PROCEDURE — 70450 CT HEAD/BRAIN W/O DYE: CPT

## 2022-07-24 PROCEDURE — U0005 INFEC AGEN DETEC AMPLI PROBE: HCPCS

## 2022-07-24 PROCEDURE — 74011250637 HC RX REV CODE- 250/637: Performed by: EMERGENCY MEDICINE

## 2022-07-24 PROCEDURE — 99284 EMERGENCY DEPT VISIT MOD MDM: CPT

## 2022-07-24 PROCEDURE — 81001 URINALYSIS AUTO W/SCOPE: CPT

## 2022-07-24 PROCEDURE — 81025 URINE PREGNANCY TEST: CPT

## 2022-07-24 PROCEDURE — 85025 COMPLETE CBC W/AUTO DIFF WBC: CPT

## 2022-07-24 PROCEDURE — 96374 THER/PROPH/DIAG INJ IV PUSH: CPT

## 2022-07-24 PROCEDURE — 87086 URINE CULTURE/COLONY COUNT: CPT

## 2022-07-24 PROCEDURE — 74011250636 HC RX REV CODE- 250/636: Performed by: EMERGENCY MEDICINE

## 2022-07-24 PROCEDURE — 80053 COMPREHEN METABOLIC PANEL: CPT

## 2022-07-24 PROCEDURE — 96375 TX/PRO/DX INJ NEW DRUG ADDON: CPT

## 2022-07-24 RX ORDER — MECLIZINE HCL 12.5 MG 12.5 MG/1
25 TABLET ORAL
Status: DISCONTINUED | OUTPATIENT
Start: 2022-07-24 | End: 2022-07-24

## 2022-07-24 RX ORDER — PROCHLORPERAZINE EDISYLATE 5 MG/ML
10 INJECTION INTRAMUSCULAR; INTRAVENOUS
Status: DISCONTINUED | OUTPATIENT
Start: 2022-07-24 | End: 2022-07-24

## 2022-07-24 RX ORDER — DIPHENHYDRAMINE HYDROCHLORIDE 50 MG/ML
12.5 INJECTION, SOLUTION INTRAMUSCULAR; INTRAVENOUS
Status: DISCONTINUED | OUTPATIENT
Start: 2022-07-24 | End: 2022-07-24

## 2022-07-24 RX ORDER — ONDANSETRON 2 MG/ML
8 INJECTION INTRAMUSCULAR; INTRAVENOUS
Status: COMPLETED | OUTPATIENT
Start: 2022-07-24 | End: 2022-07-24

## 2022-07-24 RX ORDER — ACETAMINOPHEN 325 MG/1
975 TABLET ORAL
Status: COMPLETED | OUTPATIENT
Start: 2022-07-24 | End: 2022-07-24

## 2022-07-24 RX ADMIN — METHYLPREDNISOLONE SODIUM SUCCINATE 125 MG: 125 INJECTION, POWDER, FOR SOLUTION INTRAMUSCULAR; INTRAVENOUS at 13:21

## 2022-07-24 RX ADMIN — ACETAMINOPHEN 975 MG: 325 TABLET ORAL at 13:22

## 2022-07-24 RX ADMIN — ONDANSETRON HYDROCHLORIDE 8 MG: 2 SOLUTION INTRAMUSCULAR; INTRAVENOUS at 13:21

## 2022-07-24 NOTE — ED TRIAGE NOTES
Pt speaks swahili.  #[id number]    Pt c/o headache and pain behind left eye. Sx x 3 weeks. +dizziness & generalized weakness.  +light sensitivity

## 2022-07-24 NOTE — ED PROVIDER NOTES
Headache   Associated symptoms include dizziness. Pertinent negatives include no fever, no palpitations, no shortness of breath, no nausea and no vomiting. Eye Pain   Associated symptoms include eye redness and dizziness. Pertinent negatives include no nausea, no vomiting, no fever and no pain. Patient is a 69-year-old female from [place] living in [place] who presents to the ED reporting a headache for over 3 weeks and continued left eye pain for over 7 years accompanied by episodic dizziness and generalized lack of energy. [facility] and started on ciprofloxacin eyedrops. She does not wear contact lenses or corrective eyewear. She states that her eye has improved but she continues to have a headache. Denies fever, cold symptoms, neck pain, visual changes, focal weakness or rash. Denies any difficulty breathing, difficulty swallowing, SOB or chest pain. Denies any nausea, vomiting or diarrhea. Pt. Reports that she has not had any food or medications today prior to arrival.  Hand eye coordination is intact, normal reflexes; normal gait. Her  drove her here. 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 Past Medical History:   Diagnosis Date    Routine screening for STI (sexually transmitted infection) 03/16/2020    Health Dept labs: Hep B testing with immunity to hepatitis B (reactive sAb, with negative sAg and total core Ab); HIV non-reactive; T pallidum/syphilis non-reactive; HCV negative. GC/CT negative.  Screening for iron deficiency anemia 03/16/2020    Health Dept labs:  Hgb 11.9 with MCV 77. Remainder of H18 normal.  Chem 8 normal except CO2 18.  Screening for tuberculosis 03/16/2020    Health Dept labs:  T-spot negative. No past surgical history on file.       Family History:   Problem Relation Age of Onset    No Known Problems Mother     No Known Problems Father        Social History     Socioeconomic History    Marital status:      Spouse name: Not on file    Number of children: Not on file    Years of education: Not on file    Highest education level: Not on file   Occupational History    Not on file   Tobacco Use    Smoking status: Never    Smokeless tobacco: Never   Substance and Sexual Activity    Alcohol use: Not Currently    Drug use: Never    Sexual activity: Not Currently   Other Topics Concern     Service Not Asked    Blood Transfusions Not Asked    Caffeine Concern Not Asked    Occupational Exposure Not Asked    Hobby Hazards Not Asked    Sleep Concern Not Asked    Stress Concern Not Asked    Weight Concern Not Asked    Special Diet Not Asked    Back Care Not Asked    Exercise Not Asked    Bike Helmet Not Asked    Seat Belt Not Asked    Self-Exams Not Asked   Social History Narrative    ** Merged History Encounter **          Social Determinants of Health     Financial Resource Strain: Not on file   Food Insecurity: Not on file   Transportation Needs: Not on file   Physical Activity: Not on file   Stress: Not on file   Social Connections: Not on file   Intimate Partner Violence: Not on file   Housing Stability: Not on file         ALLERGIES: Patient has no known allergies. Review of Systems   Constitutional:  Negative for activity change, appetite change, fever and unexpected weight change. HENT:  Negative for congestion, rhinorrhea, sore throat and trouble swallowing. Eyes:  Positive for redness. Negative for pain and visual disturbance. Respiratory:  Negative for cough and shortness of breath. Cardiovascular:  Negative for chest pain, palpitations and leg swelling. Gastrointestinal:  Negative for abdominal pain, constipation, diarrhea, nausea and vomiting. Genitourinary:  Negative for dysuria, pelvic pain, vaginal bleeding and vaginal discharge. Musculoskeletal:  Negative for back pain and neck pain. Skin:  Negative for rash.    Neurological:  Positive for dizziness, light-headedness and headaches. All other systems reviewed and are negative. Vitals:    07/24/22 0949   BP: 134/87   Pulse: 66   Resp: 18   Temp: 97 °F (36.1 °C)   SpO2: 99%            Physical Exam  Vitals and nursing note reviewed. Constitutional:       General: She is not in acute distress. Appearance: Normal appearance. She is not ill-appearing, toxic-appearing or diaphoretic. Comments: Black female born in [place] and speaks [address]; ; non-smoker   HENT:      Head: Normocephalic. Cardiovascular:      Rate and Rhythm: Normal rate and regular rhythm. Pulmonary:      Effort: Pulmonary effort is normal.      Breath sounds: Normal breath sounds. Musculoskeletal:      Cervical back: Normal range of motion and neck supple. Lymphadenopathy:      Cervical: No cervical adenopathy. Neurological:      Mental Status: She is alert. MDM         Procedures      CT HEAD WO CONT    Result Date: 7/24/2022  No acute intracranial abnormality. Labs Reviewed   CBC WITH AUTOMATED DIFF - Abnormal; Notable for the following components:       Result Value    HGB 11.2 (*)     MCV 74.9 (*)     MCH 23.8 (*)     All other components within normal limits   METABOLIC PANEL, COMPREHENSIVE - Abnormal; Notable for the following components:    BUN/Creatinine ratio 10 (*)     AST (SGOT) 9 (*)     Albumin 3.3 (*)     Globulin 4.4 (*)     A-G Ratio 0.8 (*)     All other components within normal limits   URINALYSIS W/MICROSCOPIC - Abnormal; Notable for the following components:    Protein TRACE (*)     Bacteria 1+ (*)     Mucus 1+ (*)     All other components within normal limits   HCG URINE, QL. - POC - Abnormal; Notable for the following components:    Pregnancy test,urine (POC) Positive (*)     All other components within normal limits   URINE CULTURE HOLD SAMPLE   SARS-COV-2   SARS-COV-2       Patient has been reexamined and is presently pain-free.   Recommend close follow-up with OB/GYN for further evaluation and prenatal care. Language line employed. Patient's results and plan of care have been reviewed with her. Patient and/or family have verbally conveyed their understanding and agreement of the patient's signs, symptoms, diagnosis, treatment and prognosis and additionally agree to follow up as recommended or return to the Emergency Room should her condition change prior to follow-up. Discharge instructions have also been provided to the patient with some educational information regarding her diagnosis as well a list of reasons why she would want to return to the ER prior to her follow-up appointment should her condition change. Audrey Oh NP

## 2022-07-25 LAB
BACTERIA SPEC CULT: NORMAL
SARS-COV-2, XPLCVT: NOT DETECTED
SERVICE CMNT-IMP: NORMAL
SOURCE, COVRS: NORMAL

## 2022-08-12 ENCOUNTER — HOSPITAL ENCOUNTER (EMERGENCY)
Age: 27
Discharge: HOME | End: 2022-08-13
Attending: EMERGENCY MEDICINE
Payer: MEDICAID

## 2022-08-12 ENCOUNTER — HOSPITAL ENCOUNTER (EMERGENCY)
Age: 27
Discharge: HOME | End: 2022-08-12
Attending: EMERGENCY MEDICINE
Payer: MEDICAID

## 2022-08-12 ENCOUNTER — TELEPHONE (OUTPATIENT)
Dept: OBGYN CLINIC | Age: 27
End: 2022-08-12

## 2022-08-12 ENCOUNTER — APPOINTMENT (OUTPATIENT)
Dept: ULTRASOUND IMAGING | Age: 27
End: 2022-08-12
Attending: EMERGENCY MEDICINE
Payer: MEDICAID

## 2022-08-12 VITALS
HEART RATE: 73 BPM | DIASTOLIC BLOOD PRESSURE: 69 MMHG | TEMPERATURE: 98.7 F | RESPIRATION RATE: 18 BRPM | SYSTOLIC BLOOD PRESSURE: 108 MMHG | OXYGEN SATURATION: 99 %

## 2022-08-12 VITALS
TEMPERATURE: 96.5 F | RESPIRATION RATE: 18 BRPM | SYSTOLIC BLOOD PRESSURE: 127 MMHG | DIASTOLIC BLOOD PRESSURE: 81 MMHG | OXYGEN SATURATION: 96 % | HEART RATE: 56 BPM

## 2022-08-12 DIAGNOSIS — O26.899 ABDOMINAL PAIN AFFECTING PREGNANCY: ICD-10-CM

## 2022-08-12 DIAGNOSIS — R10.9 ABDOMINAL PAIN AFFECTING PREGNANCY: ICD-10-CM

## 2022-08-12 DIAGNOSIS — O20.0 THREATENED MISCARRIAGE IN EARLY PREGNANCY: Primary | ICD-10-CM

## 2022-08-12 LAB
ABO + RH BLD: NORMAL
ALBUMIN SERPL-MCNC: 3.7 G/DL (ref 3.5–5)
ALBUMIN/GLOB SERPL: 0.9 {RATIO} (ref 1.1–2.2)
ALP SERPL-CCNC: 49 U/L (ref 45–117)
ALT SERPL-CCNC: 26 U/L (ref 12–78)
ANION GAP SERPL CALC-SCNC: 11 MMOL/L (ref 5–15)
APPEARANCE UR: ABNORMAL
AST SERPL-CCNC: 14 U/L (ref 15–37)
BACTERIA URNS QL MICRO: ABNORMAL /HPF
BASOPHILS # BLD: 0 K/UL (ref 0–0.1)
BASOPHILS NFR BLD: 0 % (ref 0–1)
BILIRUB SERPL-MCNC: 0.4 MG/DL (ref 0.2–1)
BILIRUB UR QL: NEGATIVE
BUN SERPL-MCNC: 19 MG/DL (ref 6–20)
BUN/CREAT SERPL: 33 (ref 12–20)
CALCIUM SERPL-MCNC: 9.8 MG/DL (ref 8.5–10.1)
CHLORIDE SERPL-SCNC: 107 MMOL/L (ref 97–108)
CO2 SERPL-SCNC: 21 MMOL/L (ref 21–32)
COLOR UR: ABNORMAL
COMMENT, HOLDF: NORMAL
CREAT SERPL-MCNC: 0.58 MG/DL (ref 0.55–1.02)
DIFFERENTIAL METHOD BLD: ABNORMAL
EOSINOPHIL # BLD: 0.1 K/UL (ref 0–0.4)
EOSINOPHIL NFR BLD: 2 % (ref 0–7)
EPITH CASTS URNS QL MICRO: ABNORMAL /LPF
ERYTHROCYTE [DISTWIDTH] IN BLOOD BY AUTOMATED COUNT: 13.9 % (ref 11.5–14.5)
GLOBULIN SER CALC-MCNC: 3.9 G/DL (ref 2–4)
GLUCOSE SERPL-MCNC: 91 MG/DL (ref 65–100)
GLUCOSE UR STRIP.AUTO-MCNC: NEGATIVE MG/DL
HCG SERPL-ACNC: 7397 MIU/ML (ref 0–6)
HCT VFR BLD AUTO: 35.4 % (ref 35–47)
HGB BLD-MCNC: 11.1 G/DL (ref 11.5–16)
HGB UR QL STRIP: ABNORMAL
HYALINE CASTS URNS QL MICRO: ABNORMAL /LPF (ref 0–5)
IMM GRANULOCYTES # BLD AUTO: 0 K/UL (ref 0–0.04)
IMM GRANULOCYTES NFR BLD AUTO: 0 % (ref 0–0.5)
KETONES UR QL STRIP.AUTO: NEGATIVE MG/DL
LEUKOCYTE ESTERASE UR QL STRIP.AUTO: ABNORMAL
LYMPHOCYTES # BLD: 1.4 K/UL (ref 0.8–3.5)
LYMPHOCYTES NFR BLD: 33 % (ref 12–49)
MCH RBC QN AUTO: 24 PG (ref 26–34)
MCHC RBC AUTO-ENTMCNC: 31.4 G/DL (ref 30–36.5)
MCV RBC AUTO: 76.6 FL (ref 80–99)
MONOCYTES # BLD: 0.3 K/UL (ref 0–1)
MONOCYTES NFR BLD: 7 % (ref 5–13)
NEUTS SEG # BLD: 2.4 K/UL (ref 1.8–8)
NEUTS SEG NFR BLD: 58 % (ref 32–75)
NITRITE UR QL STRIP.AUTO: NEGATIVE
NRBC # BLD: 0 K/UL (ref 0–0.01)
NRBC BLD-RTO: 0 PER 100 WBC
PH UR STRIP: 7.5 [PH] (ref 5–8)
PLATELET # BLD AUTO: 164 K/UL (ref 150–400)
PMV BLD AUTO: 11.2 FL (ref 8.9–12.9)
POTASSIUM SERPL-SCNC: 3.8 MMOL/L (ref 3.5–5.1)
PROT SERPL-MCNC: 7.6 G/DL (ref 6.4–8.2)
PROT UR STRIP-MCNC: ABNORMAL MG/DL
RBC # BLD AUTO: 4.62 M/UL (ref 3.8–5.2)
RBC #/AREA URNS HPF: ABNORMAL /HPF (ref 0–5)
SAMPLES BEING HELD,HOLD: NORMAL
SODIUM SERPL-SCNC: 139 MMOL/L (ref 136–145)
SP GR UR REFRACTOMETRY: 1 (ref 1–1.03)
UR CULT HOLD, URHOLD: NORMAL
UROBILINOGEN UR QL STRIP.AUTO: 0.2 EU/DL (ref 0.2–1)
WBC # BLD AUTO: 4.3 K/UL (ref 3.6–11)
WBC URNS QL MICRO: ABNORMAL /HPF (ref 0–4)

## 2022-08-12 PROCEDURE — 84702 CHORIONIC GONADOTROPIN TEST: CPT

## 2022-08-12 PROCEDURE — 36415 COLL VENOUS BLD VENIPUNCTURE: CPT

## 2022-08-12 PROCEDURE — 99284 EMERGENCY DEPT VISIT MOD MDM: CPT

## 2022-08-12 PROCEDURE — 81001 URINALYSIS AUTO W/SCOPE: CPT

## 2022-08-12 PROCEDURE — 76817 TRANSVAGINAL US OBSTETRIC: CPT

## 2022-08-12 PROCEDURE — 86900 BLOOD TYPING SEROLOGIC ABO: CPT

## 2022-08-12 PROCEDURE — 85025 COMPLETE CBC W/AUTO DIFF WBC: CPT

## 2022-08-12 PROCEDURE — 80053 COMPREHEN METABOLIC PANEL: CPT

## 2022-08-12 PROCEDURE — 76801 OB US < 14 WKS SINGLE FETUS: CPT

## 2022-08-12 RX ORDER — ACETAMINOPHEN 500 MG
1000 TABLET ORAL ONCE
Status: COMPLETED | OUTPATIENT
Start: 2022-08-12 | End: 2022-08-13

## 2022-08-12 RX ORDER — ACETAMINOPHEN 325 MG/1
650 TABLET ORAL
Qty: 20 TABLET | Refills: 0 | Status: SHIPPED | OUTPATIENT
Start: 2022-08-12

## 2022-08-12 NOTE — ED TRIAGE NOTES
She arrives with a family member saying she has abdominal pain. she speaks Swahili. She indicates she is 2 months pregnant. She says she has had the pain since 6pm with vaginal bleeding. The interpretor was disconnected.

## 2022-08-12 NOTE — TELEPHONE ENCOUNTER
August 12, 2022    Kimmy Nascimento LPN  to Me    TT    3:94 PM   Per Midwife Carolin Dos Santos- If bleeding this heavily and hurting this bad, she shouldreturn to the ER.        Yohanbeth  Florence Goldman #[id number] assisted with communication  This nurse attempted three numbers and left a detailed message for the patient to call the office back

## 2022-08-12 NOTE — ED PROVIDER NOTES
Patient is a 31year old female who  who presents to ED c/o lower abdominal pain and vaginal bleeding which at 6pm.  Patient reports vaginal bleeding is moderate amount. She reports she is currently 2 months pregnant, unsure date of LMP. She denies any fever, chills, N/V/D, urinary frequency, dysuria, flank pain. She has not yet been seen by OBGYN for this pregnancy. Patient's last pregnancy was complicated by IUFD, abruption and post partum hypertension. Swahili  was used throughout encounter. Past Medical History:   Diagnosis Date    Routine screening for STI (sexually transmitted infection) 2020    Health Dept labs: Hep B testing with immunity to hepatitis B (reactive sAb, with negative sAg and total core Ab); HIV non-reactive; T pallidum/syphilis non-reactive; HCV negative. GC/CT negative. Screening for iron deficiency anemia 2020    Health Dept labs:  Hgb 11.9 with MCV 77. Remainder of H18 normal.  Chem 8 normal except CO2 18. Screening for tuberculosis 2020    Health Dept labs:  T-spot negative. History reviewed. No pertinent surgical history.       Family History:   Problem Relation Age of Onset    No Known Problems Mother     No Known Problems Father        Social History     Socioeconomic History    Marital status:      Spouse name: Not on file    Number of children: Not on file    Years of education: Not on file    Highest education level: Not on file   Occupational History    Not on file   Tobacco Use    Smoking status: Never    Smokeless tobacco: Never   Substance and Sexual Activity    Alcohol use: Not Currently    Drug use: Never    Sexual activity: Not Currently   Other Topics Concern     Service Not Asked    Blood Transfusions Not Asked    Caffeine Concern Not Asked    Occupational Exposure Not Asked    Hobby Hazards Not Asked    Sleep Concern Not Asked    Stress Concern Not Asked    Weight Concern Not Asked    Special Diet Not Asked    Back Care Not Asked    Exercise Not Asked    Bike Helmet Not Asked    Seat Belt Not Asked    Self-Exams Not Asked   Social History Narrative    ** Merged History Encounter **          Social Determinants of Health     Financial Resource Strain: Not on file   Food Insecurity: Not on file   Transportation Needs: Not on file   Physical Activity: Not on file   Stress: Not on file   Social Connections: Not on file   Intimate Partner Violence: Not on file   Housing Stability: Not on file         ALLERGIES: Patient has no known allergies. Review of Systems   Constitutional:  Negative for activity change, appetite change, chills and fever. HENT:  Negative for congestion and sore throat. Eyes:  Negative for pain and visual disturbance. Respiratory:  Negative for cough and shortness of breath. Cardiovascular:  Negative for chest pain, palpitations and leg swelling. Gastrointestinal:  Positive for abdominal pain. Negative for abdominal distention, constipation, diarrhea, nausea and vomiting. Genitourinary:  Positive for pelvic pain and vaginal bleeding. Negative for decreased urine volume, difficulty urinating, dysuria, flank pain, frequency, menstrual problem, urgency, vaginal discharge and vaginal pain. Musculoskeletal:  Negative for back pain and neck pain. Skin:  Negative for rash and wound. Allergic/Immunologic: Negative for immunocompromised state. Neurological:  Negative for dizziness, syncope, weakness, light-headedness, numbness and headaches. Psychiatric/Behavioral:  Negative for confusion. All other systems reviewed and are negative. Vitals:    08/12/22 0101   BP: 127/81   Pulse: (!) 56   Resp: 18   Temp: (!) 96.5 °F (35.8 °C)   SpO2: 96%            Physical Exam  Vitals and nursing note reviewed. Constitutional:       General: She is not in acute distress. Appearance: Normal appearance. She is well-developed. She is not toxic-appearing.    HENT:      Head: Normocephalic and atraumatic. Nose: Nose normal.      Mouth/Throat:      Mouth: Mucous membranes are moist.   Eyes:      General: Lids are normal.      Extraocular Movements: Extraocular movements intact. Conjunctiva/sclera: Conjunctivae normal.   Cardiovascular:      Rate and Rhythm: Normal rate and regular rhythm. Pulses: Normal pulses. Heart sounds: Normal heart sounds, S1 normal and S2 normal.   Pulmonary:      Effort: Pulmonary effort is normal. No accessory muscle usage. Breath sounds: Normal breath sounds. Abdominal:      General: Bowel sounds are normal.      Palpations: Abdomen is soft. Comments: Minimal lower abdominal tenderness. No rebound or guarding    Genitourinary:     Comments: Exam deferred   Musculoskeletal:         General: Normal range of motion. Cervical back: Normal range of motion and neck supple. Skin:     General: Skin is warm and dry. Capillary Refill: Capillary refill takes less than 2 seconds. Neurological:      General: No focal deficit present. Mental Status: She is alert and oriented to person, place, and time. Mental status is at baseline. Psychiatric:         Attention and Perception: Attention normal.         Mood and Affect: Mood and affect normal.         Speech: Speech normal.         Behavior: Behavior is cooperative. Thought Content: Thought content normal.         Cognition and Memory: Cognition normal.         Judgment: Judgment normal.        MDM  Number of Diagnoses or Management Options  Diagnosis management comments: Patient presents with vaginal bleeding and abdominal pain in early pregnancy. Hgb 11.1 which is her baseline (11.2 on 7/24). Blood type Opos, rhogam not indicated. Patient will need close follow-up with her OBGYN for further evaluation and management.         Amount and/or Complexity of Data Reviewed  Clinical lab tests: reviewed  Tests in the radiology section of CPT®: reviewed  Review and summarize past medical records: yes  Discuss the patient with other providers: yes (Dr. CALLUM Segal, ED Attending )           Procedures

## 2022-08-12 NOTE — TELEPHONE ENCOUNTER
31year old patient 10/27/2021 for pp problem  HIPPA verified to speak to friend Saroj Martinez    Patient reports her LMP is June 25 2022 6w6d pregnant? Patient went to the ER ( see notes) for abdominal pain and vaginal bleeding      Patient reports continued vaginal bleeding that she is changing her pad every 2 hours and has abdominal pain at 7 on the pain scale of 1-10. Patient is not drinking a lot of water    Patient was advised to increase po fluids, and was provided pain and bleeding precautions. Patient is wondering how to proceed    ? Repeat labs  ?  Earlier ob appointment    Please advise   Swahili language needed    Thank you

## 2022-08-12 NOTE — DISCHARGE INSTRUCTIONS
Thank you for allowing us to provide you with medical care today. We realize that you have many choices for your emergency care needs. We thank you for choosing [facility]. Please choose us in the future for any continued health care needs. We hope we addressed all of your medical concerns. We strive to provide excellent quality care in the Emergency Department. Anything less than excellent does not meet our expectations. The exam and treatment you received in the Emergency Department were for an emergent problem and are not intended as complete care. It is important that you follow up with a doctor, nurse practitioner, or physician's assistant for ongoing care. If your symptoms worsen or you do not improve as expected and you are unable to reach your usual health care provider, you should return to the Emergency Department. We are available 24 hours a day. Take this sheet with you when you go to your follow-up visit. If you have any problem arranging the follow-up visit, contact the Emergency Department immediately. Make an appointment your family doctor for follow up of this visit. Return to the ER if you are unable to be seen in a timely manner.

## 2022-08-12 NOTE — TELEPHONE ENCOUNTER
[id number] Rebecca assisted with communication    Patient reports she is still having vaginal bleeding changing a pad every hour but it is not saturated and is having abdominal pain at 7 on the pain scale of 1-10. Patient advised to increase po fluids, take some more tylenol and if symptoms do not improve to go to the er      Patient asking for a note for work and was advised of need to get a note from the er since we did not see her in the office    Patient was advised CNM recommendations and verbalized understanding.

## 2022-08-12 NOTE — ED NOTES
Patient provided with AVS. Follow up care explained. Patient verbalized understanding. Ambulatory to ED exit without difficulty.

## 2022-08-13 PROCEDURE — 74011250637 HC RX REV CODE- 250/637: Performed by: STUDENT IN AN ORGANIZED HEALTH CARE EDUCATION/TRAINING PROGRAM

## 2022-08-13 RX ADMIN — ACETAMINOPHEN 1000 MG: 500 TABLET ORAL at 00:05

## 2022-08-13 NOTE — ED PROVIDER NOTES
Patient is a 76-year-old female who is  who presents to ED c/o lower abdominal pain and vaginal bleeding which started yesterday. Patient was seen in ED and had labs and ultrasound performed. Ultrasound demonstrated fetal pole measuring about 5 weeks. Fetal heart tones not positively identified. Patient was advised to follow-up with her OBGYN. Patient reports she called her OBGYN and has an appointment scheduled on . Reports she continues to have lower abdominal pain and vaginal bleeding. States vaginal bleeding is the same as when she was seen early this morning. OBGYN recommended patient take tylenol. Patient reports she has not taken any medication because she is unsure what tylenol is. Patient also requested a work note. She denies shortness of breath, passing large blood clots, weakness, syncope. Past Medical History:   Diagnosis Date    Routine screening for STI (sexually transmitted infection) 2020    Health Dept labs: Hep B testing with immunity to hepatitis B (reactive sAb, with negative sAg and total core Ab); HIV non-reactive; T pallidum/syphilis non-reactive; HCV negative. GC/CT negative.  Screening for iron deficiency anemia 2020    Health Dept labs:  Hgb 11.9 with MCV 77. Remainder of H18 normal.  Chem 8 normal except CO2 18.  Screening for tuberculosis 2020    Health Dept labs:  T-spot negative. No past surgical history on file.       Family History:   Problem Relation Age of Onset    No Known Problems Mother     No Known Problems Father        Social History     Socioeconomic History    Marital status:      Spouse name: Not on file    Number of children: Not on file    Years of education: Not on file    Highest education level: Not on file   Occupational History    Not on file   Tobacco Use    Smoking status: Never    Smokeless tobacco: Never   Substance and Sexual Activity    Alcohol use: Not Currently    Drug use: Never    Sexual activity: Not Currently   Other Topics Concern     Service Not Asked    Blood Transfusions Not Asked    Caffeine Concern Not Asked    Occupational Exposure Not Asked    Hobby Hazards Not Asked    Sleep Concern Not Asked    Stress Concern Not Asked    Weight Concern Not Asked    Special Diet Not Asked    Back Care Not Asked    Exercise Not Asked    Bike Helmet Not Asked    Seat Belt Not Asked    Self-Exams Not Asked   Social History Narrative    ** Merged History Encounter **          Social Determinants of Health     Financial Resource Strain: Not on file   Food Insecurity: Not on file   Transportation Needs: Not on file   Physical Activity: Not on file   Stress: Not on file   Social Connections: Not on file   Intimate Partner Violence: Not on file   Housing Stability: Not on file         ALLERGIES: Patient has no known allergies. Review of Systems   Constitutional:  Negative for chills and fever. Respiratory:  Negative for shortness of breath. Gastrointestinal:  Positive for abdominal pain. Negative for nausea and vomiting. Genitourinary:  Positive for menstrual problem, pelvic pain and vaginal bleeding. Negative for decreased urine volume, difficulty urinating, dysuria, flank pain, frequency, urgency, vaginal discharge and vaginal pain. Neurological:  Negative for syncope. All other systems reviewed and are negative. Vitals:    08/12/22 2210 08/12/22 2219   BP: 101/67 108/69   Pulse: 73    Resp: 18    Temp: 98.7 °F (37.1 °C)    SpO2: 99%             Physical Exam  Vitals and nursing note reviewed. Constitutional:       General: She is not in acute distress. Appearance: Normal appearance. She is well-developed. She is not toxic-appearing. HENT:      Head: Normocephalic and atraumatic. Nose: Nose normal.      Mouth/Throat:      Mouth: Mucous membranes are moist.   Eyes:      General: Lids are normal.      Extraocular Movements: Extraocular movements intact. Conjunctiva/sclera: Conjunctivae normal.   Cardiovascular:      Rate and Rhythm: Normal rate and regular rhythm. Pulses: Normal pulses. Heart sounds: Normal heart sounds, S1 normal and S2 normal.   Pulmonary:      Effort: Pulmonary effort is normal. No accessory muscle usage. Breath sounds: Normal breath sounds. Abdominal:      Palpations: Abdomen is soft. Comments: Minimal suprapubic tenderness. No rebound or guarding    Genitourinary:     Comments: Exam deferred. Musculoskeletal:         General: Normal range of motion. Cervical back: Normal range of motion and neck supple. Skin:     General: Skin is warm and dry. Capillary Refill: Capillary refill takes less than 2 seconds. Neurological:      General: No focal deficit present. Mental Status: She is alert and oriented to person, place, and time. Mental status is at baseline. Psychiatric:         Attention and Perception: Attention normal.         Mood and Affect: Mood and affect normal.         Speech: Speech normal.         Behavior: Behavior is cooperative. Thought Content: Thought content normal.         Cognition and Memory: Cognition normal.         Judgment: Judgment normal.        MDM  Number of Diagnoses or Management Options  Diagnosis management comments: Patient seen and evaluated earlier today and presents with same complaints. Reports symptoms are persistent. Denies passing large blood clots, or worsening of abdominal pain. VSS. Hgb 11.2 at 0130 this morning.  Ultrasound showed            Procedures

## 2022-08-13 NOTE — DISCHARGE INSTRUCTIONS
Take tylenol (acetaminophen) to help with abdominal pain. Please follow-up with your OBGYN as scheduled.  If you develop worsening vaginal bleeding, weakness, shortness of breath, palpitations, or feel like you're going to pass out - RETURN TO ER.

## 2022-08-13 NOTE — ED TRIAGE NOTES
Pt seen in this ED at 0100 for vaginal bleeding, threatened miscarriag, discharged. Pt reports still having a lot of abdominal cramping, continued vaginal bleeding with no change. Swahili  used. Deborah GALVAN with patient using .

## 2022-08-17 NOTE — ED PROVIDER NOTES
Patient is a 31year old female who is  who presents with vaginal bleeding and lower abdominal pain. Patient was seen in this ED at 0100 for same complaint. She had an ultrasound that demonstrated fetal pole measuring about 5 weeks. Fetal heart tones not positively identified. Patient was told this could represent a failed pregnancy, early pregnancy prior to heart tones being visualized, threatened miscarriage. Patient reports she contacted her OBGYN and has an appointment scheduled. She continued to have vaginal bleeding and abdominal pain throughout the day, same as prior to being seen earlier. OBGYN advised patient take tylenol, but patient states Esperanza Tellez is unsure where to find it. \" She has no other complaints at this time. Swahili  was used throughout encounter. Past Medical History:   Diagnosis Date    Routine screening for STI (sexually transmitted infection) 2020    Health Dept labs: Hep B testing with immunity to hepatitis B (reactive sAb, with negative sAg and total core Ab); HIV non-reactive; T pallidum/syphilis non-reactive; HCV negative. GC/CT negative. Screening for iron deficiency anemia 2020    Health Dept labs:  Hgb 11.9 with MCV 77. Remainder of H18 normal.  Chem 8 normal except CO2 18. Screening for tuberculosis 2020    Health Dept labs:  T-spot negative. No past surgical history on file.       Family History:   Problem Relation Age of Onset    No Known Problems Mother     No Known Problems Father        Social History     Socioeconomic History    Marital status:      Spouse name: Not on file    Number of children: Not on file    Years of education: Not on file    Highest education level: Not on file   Occupational History    Not on file   Tobacco Use    Smoking status: Never    Smokeless tobacco: Never   Substance and Sexual Activity    Alcohol use: Not Currently    Drug use: Never    Sexual activity: Not Currently   Other Topics Concern     Service Not Asked    Blood Transfusions Not Asked    Caffeine Concern Not Asked    Occupational Exposure Not Asked    Hobby Hazards Not Asked    Sleep Concern Not Asked    Stress Concern Not Asked    Weight Concern Not Asked    Special Diet Not Asked    Back Care Not Asked    Exercise Not Asked    Bike Helmet Not Asked    Seat Belt Not Asked    Self-Exams Not Asked   Social History Narrative    ** Merged History Encounter **          Social Determinants of Health     Financial Resource Strain: Not on file   Food Insecurity: Not on file   Transportation Needs: Not on file   Physical Activity: Not on file   Stress: Not on file   Social Connections: Not on file   Intimate Partner Violence: Not on file   Housing Stability: Not on file         ALLERGIES: Patient has no known allergies. Review of Systems   Constitutional:  Negative for chills and fever. Gastrointestinal:  Positive for abdominal pain. Negative for diarrhea, nausea and vomiting. Genitourinary:  Positive for menstrual problem, pelvic pain and vaginal bleeding. Negative for decreased urine volume, difficulty urinating, frequency, vaginal discharge and vaginal pain. Neurological:  Negative for syncope and headaches. All other systems reviewed and are negative. Vitals:    08/12/22 2210 08/12/22 2219   BP: 101/67 108/69   Pulse: 73    Resp: 18    Temp: 98.7 °F (37.1 °C)    SpO2: 99%             Physical Exam  Vitals and nursing note reviewed. Constitutional:       General: She is not in acute distress. Appearance: Normal appearance. She is well-developed. She is not toxic-appearing. HENT:      Head: Normocephalic and atraumatic. Nose: Nose normal.      Mouth/Throat:      Mouth: Mucous membranes are moist.   Eyes:      General: Lids are normal.      Extraocular Movements: Extraocular movements intact. Conjunctiva/sclera: Conjunctivae normal.   Cardiovascular:      Rate and Rhythm: Normal rate and regular rhythm. Pulses: Normal pulses. Heart sounds: Normal heart sounds, S1 normal and S2 normal.   Pulmonary:      Effort: Pulmonary effort is normal. No accessory muscle usage. Breath sounds: Normal breath sounds. Abdominal:      Palpations: Abdomen is soft. Tenderness: There is no abdominal tenderness. There is no guarding or rebound. Genitourinary:     Comments: Exam deferred. Musculoskeletal:         General: Normal range of motion. Cervical back: Normal range of motion and neck supple. Skin:     General: Skin is warm and dry. Capillary Refill: Capillary refill takes less than 2 seconds. Neurological:      General: No focal deficit present. Mental Status: She is alert and oriented to person, place, and time. Mental status is at baseline. Psychiatric:         Attention and Perception: Attention normal.         Mood and Affect: Mood and affect normal.         Speech: Speech normal.         Behavior: Behavior is cooperative. Thought Content: Thought content normal.         Cognition and Memory: Cognition normal.         Judgment: Judgment normal.        MDM  Number of Diagnoses or Management Options  Abdominal pain affecting pregnancy  Threatened miscarriage in early pregnancy  Diagnosis management comments: Patient is a 31year old female with history as described above. She presents with vaginal bleeding and lower abdominal pain which she was seen for earlier today. Workup this morning showed fetal pole measuring 5 weeks, no fetal heart tones visualized. Patient was educated on diagnosis of failed pregnancy, threatened , or early pregnancy prior to visualization of heart tones. She has a scheduled follow-up appointment with her OBGYN. She was unsure where to buy/find tylenol (acetaminophen) for abdominal pain. Reports pain and vaginal bleeding are same as when she was seen prior. Additional workup not indicated.  Patient given dose of tylenol and instructions on where she can purchase this. Patient will follow-up with OBGYN as scheduled. Strict return to ER precautions discussed in detail.             Amount and/or Complexity of Data Reviewed  Review and summarize past medical records: yes  Discuss the patient with other providers: yes (Dr. Conner Darnell, ED Attending )           Procedures

## 2022-08-22 ENCOUNTER — ROUTINE PRENATAL (OUTPATIENT)
Dept: OBGYN CLINIC | Age: 27
End: 2022-08-22

## 2022-08-22 VITALS
DIASTOLIC BLOOD PRESSURE: 60 MMHG | BODY MASS INDEX: 35.87 KG/M2 | SYSTOLIC BLOOD PRESSURE: 112 MMHG | WEIGHT: 190 LBS | HEIGHT: 61 IN

## 2022-08-22 DIAGNOSIS — O20.0 THREATENED ABORTION: Primary | ICD-10-CM

## 2022-08-22 PROCEDURE — 99213 OFFICE O/P EST LOW 20 MIN: CPT | Performed by: MIDWIFE

## 2022-08-22 NOTE — PROGRESS NOTES
Threatened AB note    Bonifacio Burr is a ,  31 y.o. female BLACK/  OTHER Patient's last menstrual period was 06/15/2022. which should make her 9 weeks pregnant. She has not had prenatal care  Int #[id number]    She presents with spotting and cramping that started 10 days ago, that she went to the ER for . The amount of bleeding is described as moderate . The cramps are described as minimal. She has not passed tissue. She had a positive pregnancy test around 22. She has had a pelvic ultrasound today that showed:     TV ULTRASOUND  THE UTERUS IS RETROVERTED, NORMAL IN SIZE, AND ECHOGENICITY. THE ENDOMETRIUM MEASURES 8MM IN THICKNESS. THERE APPEARS TO BE COMPLEX FLUID WITH NO  VASCULARITY WITHIN THE ENDOMETRIUM. POSSIBLE RPOC. RIGHT OVARY APPEARS WNL. LEFT OVARY NOT VISUALIZED DUE TO BOWEL GAS. LEFT ADNEXA APPEARS WNL. NO FREE FLUID IS SEEN IN THE CDS. Aaliyah Sarmiento Her past medical history is not significant for risk factors for ectopic pregnancy. Last year she did have a fetal loss at 37 weeks due to placental abruption. She has not had pelvic pain. The patient specifically denies right or left pelvic pain. She does not have a history of a spontaneous . She has not had a recent injury or trauma. Additional complaints: none. Past Medical History:   Diagnosis Date    Routine screening for STI (sexually transmitted infection) 2020    Health Dept labs: Hep B testing with immunity to hepatitis B (reactive sAb, with negative sAg and total core Ab); HIV non-reactive; T pallidum/syphilis non-reactive; HCV negative. GC/CT negative. Screening for iron deficiency anemia 2020    Health Dept labs:  Hgb 11.9 with MCV 77. Remainder of H18 normal.  Chem 8 normal except CO2 18.       Screening for tuberculosis 03/16/2020    Health Dept labs:  T-spot negative. No past surgical history on file. Social History     Occupational History    Not on file   Tobacco Use    Smoking status: Never    Smokeless tobacco: Never   Substance and Sexual Activity    Alcohol use: Not Currently    Drug use: Never    Sexual activity: Not Currently     Family History   Problem Relation Age of Onset    No Known Problems Mother     No Known Problems Father        No Known Allergies  Prior to Admission medications    Medication Sig Start Date End Date Taking? Authorizing Provider   acetaminophen (TYLENOL) 325 mg tablet Take 2 Tablets by mouth every four (4) hours as needed for Pain.  8/12/22  Yes MANDY Chapin        Review of Systems: History obtained from the patient  Constitutional: negative for weight loss, fever, night sweats  Breast: negative for breast lumps, nipple discharge, galactorrhea  GI: negative for change in bowel habits, abdominal pain, black or bloody stools  : negative for frequency, dysuria, hematuria, vaginal discharge  MSK: negative for back pain, joint pain, muscle pain  Skin: negative for itching, rash, hives  Psych: negative for anxiety, depression, change in mood      Objective:  Visit Vitals  /60   Ht 5' 1\" (1.549 m)   Wt 190 lb (86.2 kg)   LMP 06/15/2022   BMI 35.90 kg/m²       Physical Exam:   PHYSICAL EXAMINATION    Constitutional  Appearance: well-nourished, well developed, alert, in no acute distress    Neurologic/Psychiatric  Mental Status:  Orientation: grossly oriented to person, place and time  Mood and Affect: mood normal, affect appropriate    Assessment:   SAB  O+    Plan:   US  Quantitative HCG's  Bleeding, infection precautions reviewed  RTO: Next Monday for lab draw only

## 2022-08-23 LAB — HCG SERPL-ACNC: 100 MIU/ML (ref 0–6)

## 2022-08-23 NOTE — PROGRESS NOTES
Will discuss normal results at next visit. Please abstract to chart. Falling as expected.   Repeat ~ 1 week

## 2022-08-29 ENCOUNTER — LAB ONLY (OUTPATIENT)
Dept: OBGYN CLINIC | Age: 27
End: 2022-08-29

## 2022-08-29 DIAGNOSIS — O20.0 THREATENED ABORTION: Primary | ICD-10-CM

## 2022-08-30 LAB — HCG SERPL-ACNC: 9 MIU/ML (ref 0–6)

## 2022-09-16 ENCOUNTER — HOSPITAL ENCOUNTER (EMERGENCY)
Age: 27
Discharge: HOME | End: 2022-09-16
Attending: EMERGENCY MEDICINE
Payer: MEDICAID

## 2022-09-16 VITALS
HEART RATE: 76 BPM | OXYGEN SATURATION: 99 % | RESPIRATION RATE: 18 BRPM | DIASTOLIC BLOOD PRESSURE: 65 MMHG | TEMPERATURE: 98 F | SYSTOLIC BLOOD PRESSURE: 121 MMHG

## 2022-09-16 DIAGNOSIS — S09.90XA CLOSED HEAD INJURY, INITIAL ENCOUNTER: ICD-10-CM

## 2022-09-16 DIAGNOSIS — R55 VASOVAGAL SYNCOPE: Primary | ICD-10-CM

## 2022-09-16 LAB
GLUCOSE BLD STRIP.AUTO-MCNC: 87 MG/DL (ref 65–117)
HCG UR QL: NEGATIVE
SERVICE CMNT-IMP: NORMAL

## 2022-09-16 PROCEDURE — 74011250637 HC RX REV CODE- 250/637: Performed by: PHYSICIAN ASSISTANT

## 2022-09-16 PROCEDURE — 93005 ELECTROCARDIOGRAM TRACING: CPT

## 2022-09-16 PROCEDURE — 81025 URINE PREGNANCY TEST: CPT

## 2022-09-16 PROCEDURE — 82962 GLUCOSE BLOOD TEST: CPT

## 2022-09-16 PROCEDURE — 99283 EMERGENCY DEPT VISIT LOW MDM: CPT

## 2022-09-16 RX ORDER — ACETAMINOPHEN 500 MG
1000 TABLET ORAL ONCE
Status: COMPLETED | OUTPATIENT
Start: 2022-09-16 | End: 2022-09-16

## 2022-09-16 RX ORDER — AMMONIA 15 % (W/V)
1 AMPUL (EA) INHALATION
Status: DISCONTINUED | OUTPATIENT
Start: 2022-09-16 | End: 2022-09-16 | Stop reason: HOSPADM

## 2022-09-16 RX ADMIN — ACETAMINOPHEN 1000 MG: 500 TABLET, FILM COATED ORAL at 14:29

## 2022-09-16 NOTE — ED PROVIDER NOTES
Junaid  #: [id number]    32yo female with past medical history significant for anxiety, presenting after a syncopal episode just prior to arrival.  She states she was at her father's  today and when she was viewing his body she was upset and \"felt lightheaded and slid down to the ground. \" She states she she slid to the ground, bumped her head on the floor. Ems was called and she awoke with ammonia inhalent. She states she has not eaten food today due to the stress of her father's .  She denies history of syncope in the past.  She specifically denies chest pain, shortness of breath, cough, URI symptoms, fever, chills, pregnancy changes. She has a dull headache, had a CT of her head in 2022 for headache that she describes \"the headache then was much worse than the headache of having right now. Its a very dull headache. \"    LMP- had complete miscarriage end of August               Past Medical History:   Diagnosis Date    Routine screening for STI (sexually transmitted infection) 2020    Health Dept labs: Hep B testing with immunity to hepatitis B (reactive sAb, with negative sAg and total core Ab); HIV non-reactive; T pallidum/syphilis non-reactive; HCV negative. GC/CT negative. Screening for iron deficiency anemia 2020    Health Dept labs:  Hgb 11.9 with MCV 77. Remainder of H18 normal.  Chem 8 normal except CO2 18. Screening for tuberculosis 2020    Health Dept labs:  T-spot negative. No past surgical history on file.       Family History:   Problem Relation Age of Onset    No Known Problems Mother     No Known Problems Father        Social History     Socioeconomic History    Marital status:      Spouse name: Not on file    Number of children: Not on file    Years of education: Not on file    Highest education level: Not on file   Occupational History    Not on file   Tobacco Use    Smoking status: Never    Smokeless tobacco: Never   Substance and Sexual Activity    Alcohol use: Not Currently    Drug use: Never    Sexual activity: Not Currently   Other Topics Concern     Service Not Asked    Blood Transfusions Not Asked    Caffeine Concern Not Asked    Occupational Exposure Not Asked    Hobby Hazards Not Asked    Sleep Concern Not Asked    Stress Concern Not Asked    Weight Concern Not Asked    Special Diet Not Asked    Back Care Not Asked    Exercise Not Asked    Bike Helmet Not Asked    Seat Belt Not Asked    Self-Exams Not Asked   Social History Narrative    ** Merged History Encounter **          Social Determinants of Health     Financial Resource Strain: Not on file   Food Insecurity: Not on file   Transportation Needs: Not on file   Physical Activity: Not on file   Stress: Not on file   Social Connections: Not on file   Intimate Partner Violence: Not on file   Housing Stability: Not on file         ALLERGIES: Patient has no known allergies. Review of Systems   Constitutional: Negative. Negative for activity change, chills, fatigue and unexpected weight change. HENT:  Negative for trouble swallowing. Respiratory:  Negative for cough, chest tightness, shortness of breath and wheezing. Cardiovascular: Negative. Negative for chest pain and palpitations. Gastrointestinal: Negative. Negative for abdominal pain, diarrhea, nausea and vomiting. Genitourinary: Negative. Negative for dysuria, flank pain, frequency and hematuria. Musculoskeletal: Negative. Negative for arthralgias, back pain, neck pain and neck stiffness. Skin: Negative. Negative for color change and rash. Neurological:  Positive for syncope and headaches. Negative for dizziness and numbness. All other systems reviewed and are negative. Vitals:    09/16/22 1322 09/16/22 1649   BP: 132/78 121/65   Pulse: 68 76   Resp: 16 18   Temp: 98.1 °F (36.7 °C) 98 °F (36.7 °C)   SpO2: 100% 99%            Physical Exam  Vitals and nursing note reviewed. Constitutional:       General: She is not in acute distress. Appearance: Normal appearance. She is well-developed. She is not toxic-appearing or diaphoretic. HENT:      Head: Normocephalic and atraumatic. Right Ear: Tympanic membrane normal.      Left Ear: Tympanic membrane normal.      Nose: Nose normal.      Mouth/Throat:      Mouth: Mucous membranes are moist.   Eyes:      General:         Right eye: No discharge. Left eye: No discharge. Conjunctiva/sclera: Conjunctivae normal.   Neck:      Trachea: No tracheal tenderness. Comments: No midline spinous process TTP throughout. No step-off. Cardiovascular:      Rate and Rhythm: Normal rate and regular rhythm. Pulses: Normal pulses. Heart sounds: Normal heart sounds. No murmur heard. No friction rub. No gallop. Pulmonary:      Effort: Pulmonary effort is normal. No respiratory distress. Breath sounds: Normal breath sounds. No wheezing or rales. Chest:      Chest wall: No tenderness. Abdominal:      General: Bowel sounds are normal. There is no distension. Palpations: Abdomen is soft. Tenderness: There is no abdominal tenderness. There is no guarding or rebound. Musculoskeletal:         General: No tenderness. Normal range of motion. Cervical back: Full passive range of motion without pain and normal range of motion. Skin:     General: Skin is warm and dry. Capillary Refill: Capillary refill takes less than 2 seconds. Findings: No abrasion, erythema or rash. Neurological:      General: No focal deficit present. Mental Status: She is alert and oriented to person, place, and time. Cranial Nerves: No cranial nerve deficit. Sensory: No sensory deficit. Coordination: Coordination normal.      Comments: Strength 5/5 in upper and lower extremities. NVI throughout. Gait normal and unassisted.    Psychiatric:         Speech: Speech normal.         Behavior: Behavior normal.        MDM  Number of Diagnoses or Management Options  Closed head injury, initial encounter  Vasovagal syncope  Diagnosis management comments:   DDx: Vasovagal syncope, electrolyte abnormality, hypoglycemia, pregnancy       Amount and/or Complexity of Data Reviewed  Review and summarize past medical records: yes  Discuss the patient with other providers: yes           Procedures    EKG interpretation:   Rhythm: normal sinus rhythm; and regular . Rate (approx.): 65; Axis: normal; P wave: normal; QRS interval: normal ; ST/T wave: normal;    Patient is alert, oriented, no signs of basilar skull fracture and no signs of TBI. She has been in the emergency department for over 3-1/2 hours with no worsening of headache in fact having improvement after Tylenol provided. Risk and benefit of CT discussed with patient would like to not go forward with CT of her head. Has had worse headaches than this in the past and had negative head CT less than 2 months ago. DISCHARGE NOTE:  The patient has been re-evaluated and feeling much better and are stable for discharge. All available radiology and laboratory results have been reviewed with patient and/or available family. Patient and/or family verbally conveyed their understanding and agreement of the patient's signs, symptoms, diagnosis, treatment and prognosis and additionally agree to follow-up as recommended in the discharge instructions or to return to the Emergency Department should their condition change or worsen prior to their follow-up appointment. All questions have been answered and patient and/or available family express understanding.       LABORATORY RESULTS:  Recent Results (from the past 24 hour(s))   EKG, 12 LEAD, INITIAL    Collection Time: 09/16/22  1:29 PM   Result Value Ref Range    Ventricular Rate 65 BPM    Atrial Rate 65 BPM    P-R Interval 154 ms    QRS Duration 82 ms    Q-T Interval 416 ms    QTC Calculation (Bezet) 432 ms    Calculated P Axis 36 degrees    Calculated R Axis 2 degrees    Calculated T Axis 23 degrees    Diagnosis       Normal sinus rhythm with sinus arrhythmia  Minimal voltage criteria for LVH, may be normal variant ( R in aVL )  Borderline ECG  No previous ECGs available     GLUCOSE, POC    Collection Time: 09/16/22  2:29 PM   Result Value Ref Range    Glucose (POC) 87 65 - 117 mg/dL    Performed by Kika Obando    HCG URINE, QL. - POC    Collection Time: 09/16/22  4:03 PM   Result Value Ref Range    Pregnancy test,urine (POC) Negative NEG             MEDICATIONS GIVEN:  Medications   ammonia aromatic 15% (W/V) ampule for inhalation (0 Ampules Inhalation Held 9/16/22 1317)   acetaminophen (TYLENOL) tablet 1,000 mg (1,000 mg Oral Given 9/16/22 1425)       IMPRESSION:  1. Vasovagal syncope    2. Closed head injury, initial encounter        PLAN:  Follow-up Information       Follow up With Specialties Details Why Contact Info    [facility]  Schedule an appointment as soon as possible for a visit  clinic for follow-up. [facility] [address] [facility]    [facility], [address] [place] [address] Vascular Surgery, Cardiovascular Disease Physician, Interventional Cardiology Physician Schedule an appointment as soon as possible for a visit  cardiologist if you have another passing out event or symptoms do not improve.  [address] [facility]  [address]  Schedule an appointment as soon as possible for a visit  primary care provider for follow-up. [phone]          Discharge Medication List as of 9/16/2022  4:35 PM

## 2022-09-16 NOTE — ED TRIAGE NOTES
Triage: Pt arrives ambulatory via EMS with CC of syncope. Pt was at a  and when she went to view the descendents body she had a syncopal episode. Per EMS pt initially unresponsive but rouses with application of ammonia inhalent. Swahili speaking only.

## 2022-09-17 LAB
ATRIAL RATE: 65 BPM
CALCULATED P AXIS, ECG09: 36 DEGREES
CALCULATED R AXIS, ECG10: 2 DEGREES
CALCULATED T AXIS, ECG11: 23 DEGREES
DIAGNOSIS, 93000: NORMAL
P-R INTERVAL, ECG05: 154 MS
Q-T INTERVAL, ECG07: 416 MS
QRS DURATION, ECG06: 82 MS
QTC CALCULATION (BEZET), ECG08: 432 MS
VENTRICULAR RATE, ECG03: 65 BPM

## 2022-11-21 ENCOUNTER — LAB ONLY (OUTPATIENT)
Dept: FAMILY MEDICINE CLINIC | Age: 27
End: 2022-11-21

## 2022-11-21 ENCOUNTER — OFFICE VISIT (OUTPATIENT)
Dept: FAMILY MEDICINE CLINIC | Age: 27
End: 2022-11-21
Payer: MEDICAID

## 2022-11-21 VITALS
BODY MASS INDEX: 37.19 KG/M2 | OXYGEN SATURATION: 98 % | SYSTOLIC BLOOD PRESSURE: 136 MMHG | WEIGHT: 197 LBS | HEIGHT: 61 IN | RESPIRATION RATE: 18 BRPM | HEART RATE: 72 BPM | TEMPERATURE: 97.8 F | DIASTOLIC BLOOD PRESSURE: 80 MMHG

## 2022-11-21 DIAGNOSIS — Z13.29 SCREENING FOR THYROID DISORDER: ICD-10-CM

## 2022-11-21 DIAGNOSIS — Z23 NEEDS FLU SHOT: ICD-10-CM

## 2022-11-21 DIAGNOSIS — R00.0 RACING HEART BEAT: Primary | ICD-10-CM

## 2022-11-21 DIAGNOSIS — Z13.220 SCREENING FOR CHOLESTEROL LEVEL: ICD-10-CM

## 2022-11-21 DIAGNOSIS — R00.0 RACING HEART BEAT: ICD-10-CM

## 2022-11-21 DIAGNOSIS — Z76.89 ESTABLISHING CARE WITH NEW DOCTOR, ENCOUNTER FOR: ICD-10-CM

## 2022-11-21 DIAGNOSIS — R07.9 CHEST PAIN, UNSPECIFIED TYPE: ICD-10-CM

## 2022-11-21 PROCEDURE — 90686 IIV4 VACC NO PRSV 0.5 ML IM: CPT | Performed by: NURSE PRACTITIONER

## 2022-11-21 PROCEDURE — 99203 OFFICE O/P NEW LOW 30 MIN: CPT | Performed by: NURSE PRACTITIONER

## 2022-11-21 NOTE — PROGRESS NOTES
HISTORY OF PRESENT ILLNESS  Arcelia Wayne is a 28 y.o. female. HPI  Pt presents as a new patient to establish care  Interpretor used: # [id number]    Pt states that she is here after going to the ER for syncope and chest pain. This was at [address] in September. ER note reviewed. She states that she has not had any fainting since being seen at [facility]    She notes that for a while, she has symptoms of her heart racing really fast.  When this occurs, she will feel short of breath and have chest pain  She states that it is rare, but occurs in more stressful situations  She denies anxiety symptoms, and denies anxiety on daily basis  She has never been seen by cardiology, and has no healthy history    She is fasting today and would like a flu vaccine at this time  Review of Systems   Constitutional:  Negative for fever. Respiratory:  Positive for shortness of breath. Cardiovascular:  Positive for chest pain. Physical Exam  Constitutional:       Appearance: Normal appearance. Cardiovascular:      Rate and Rhythm: Normal rate and regular rhythm. Heart sounds: Normal heart sounds. Pulmonary:      Effort: Pulmonary effort is normal.      Breath sounds: Normal breath sounds. Neurological:      Mental Status: She is alert. Psychiatric:         Mood and Affect: Mood normal.         Behavior: Behavior normal.       ASSESSMENT and PLAN    ICD-10-CM ICD-9-CM    1. Racing heart beat  R00.0 785.0 CBC W/O DIFF      METABOLIC PANEL, COMPREHENSIVE      LIPID PANEL      REFERRAL TO CARDIOLOGY      2. Establishing care with new doctor, encounter for  Z76.89 V65.8       3. Chest pain, unspecified type  R07.9 786.50 LIPID PANEL      REFERRAL TO CARDIOLOGY      4. Screening for cholesterol level  Z13.220 V77.91 LIPID PANEL      5. Screening for thyroid disorder  Z13.29 V77.0 THYROID CASCADE PROFILE      6.  Needs flu shot  Z23 V04.81 INFLUENZA, FLUARIX, FLULAVAL, FLUZONE (AGE 6 MO+), AFLURIA(AGE 3Y+) IM, PF, 0.5 ML      Will notify when labs return and inform her of any change in plan of care at that time  Educated about calling cardiology for appointment today  Vaccine and VIS given    Pt informed to return to office with worsening of symptoms, or PRN with any questions or concerns. Pt verbalizes understanding of plan of care and denies further questions or concerns at this time.

## 2022-11-21 NOTE — PROGRESS NOTES
Identified pt with two pt identifiers(name and ). Chief Complaint   Patient presents with    New Patient    Labs     fasting        Health Maintenance Due   Topic    Depression Screen     COVID-19 Vaccine (1)    Pap Smear     Flu Vaccine (1)       Wt Readings from Last 3 Encounters:   22 190 lb (86.2 kg)   10/27/21 169 lb (76.7 kg)   10/15/21 166 lb (75.3 kg)     Temp Readings from Last 3 Encounters:   22 98 °F (36.7 °C)   22 98.7 °F (37.1 °C)   22 (!) 96.5 °F (35.8 °C)     BP Readings from Last 3 Encounters:   22 121/65   22 112/60   22 108/69     Pulse Readings from Last 3 Encounters:   22 76   22 73   22 (!) 56         Learning Assessment:  :     Learning Assessment 2020   PRIMARY LEARNER Patient   BARRIERS PRIMARY LEARNER LANGUAGE   PRIMARY LANGUAGE OTHER (COMMENT)   LEARNER PREFERENCE PRIMARY READING     VIDEOS   ANSWERED BY patient   RELATIONSHIP SELF       Depression Screening:  :     3 most recent PHQ Screens 2022   Little interest or pleasure in doing things Not at all   Feeling down, depressed, irritable, or hopeless Not at all   Total Score PHQ 2 0       Fall Risk Assessment:  :     No flowsheet data found. Abuse Screening:  :     Abuse Screening Questionnaire 2022   Do you ever feel afraid of your partner? N N   Are you in a relationship with someone who physically or mentally threatens you? N N   Is it safe for you to go home? Y Y       Coordination of Care Questionnaire:  :     1) Have you been to an emergency room, urgent care clinic since your last visit? yes 22 for syncope  Hospitalized since your last visit? no             2) Have you seen or consulted any other health care providers outside of [address] [place] [address] since your last visit? no  (Include any pap smears or colon screenings in this section.)    3) Do you have an Advance Directive on file?  no  Are you interested in receiving information about Advance Directives? no    Patient is accompanied by  I have received verbal consent from [address] [place] [address] to discuss any/all medical information while they are present in the room. 4.  For patients aged 39-70: Has the patient had a colonoscopy / FIT/ Cologuard? NA - based on age      If the patient is female:    11. For patients aged 41-77: Has the patient had a mammogram within the past 2 years? NA - based on age or sex      10. For patients aged 21-65: Has the patient had a pap smear?  No

## 2022-11-21 NOTE — PATIENT INSTRUCTIONS
Vaccine Information Statement    Influenza (Flu) Vaccine (Inactivated or Recombinant): What You Need to Know    Many vaccine information statements are available in Setsw[place] and other languages. See www.immunize.org/vis. Hojas de información sobre vacunas están disponibles en español y en muchos otros idiomas. Visite www.immunize.org/vis. 1. Why get vaccinated? Influenza vaccine can prevent influenza (flu). Flu is a contagious disease that spreads around the [facility] every year, usually between October and May. Anyone can get the flu, but it is more dangerous for some people. Infants and young children, people 72 years and older, pregnant people, and people with certain health conditions or a weakened immune system are at greatest risk of flu complications. Pneumonia, bronchitis, sinus infections, and ear infections are examples of flu-related complications. If you have a medical condition, such as heart disease, cancer, or diabetes, flu can make it worse. Flu can cause fever and chills, sore throat, muscle aches, fatigue, cough, headache, and runny or stuffy nose. Some people may have vomiting and diarrhea, though this is more common in children than adults. In an average year, thousands of people in the [facility] die from flu, and many more are hospitalized. Flu vaccine prevents millions of illnesses and flu-related visits to the doctor each year. 2. Influenza vaccines     CDC recommends everyone 6 months and older get vaccinated every flu season. Children 6 months through 6years of age may need 2 doses during a single flu season. Everyone else needs only 1 dose each flu season. It takes about 2 weeks for protection to develop after vaccination. There are many flu viruses, and they are always changing. Each year a new flu vaccine is made to protect against the influenza viruses believed to be likely to cause disease in the upcoming flu season.  Even when the vaccine doesnt exactly match these viruses, it may still provide some protection. Influenza vaccine does not cause flu. Influenza vaccine may be given at the same time as other vaccines. 3. Talk with your health care provider    Tell your vaccination provider if the person getting the vaccine:  Has had an allergic reaction after a previous dose of influenza vaccine, or has any severe, life-threatening allergies   Has ever had Guillain-Barré Syndrome (also called GBS)    In some cases, your health care provider may decide to postpone influenza vaccination until a future visit. Influenza vaccine can be administered at any time during pregnancy. People who are or will be pregnant during influenza season should receive inactivated influenza vaccine. People with minor illnesses, such as a cold, may be vaccinated. People who are moderately or severely ill should usually wait until they recover before getting influenza vaccine. Your health care provider can give you more information. 4. Risks of a vaccine reaction    Soreness, redness, and swelling where the shot is given, fever, muscle aches, and headache can happen after influenza vaccination. There may be a very small increased risk of Guillain-Barré Syndrome (GBS) after inactivated influenza vaccine (the flu shot). [facility] children who get the flu shot along with pneumococcal vaccine (PCV13) and/or DTaP vaccine at the same time might be slightly more likely to have a seizure caused by fever. Tell your health care provider if a child who is getting flu vaccine has ever had a seizure. People sometimes faint after medical procedures, including vaccination. Tell your provider if you feel dizzy or have vision changes or ringing in the ears. As with any medicine, there is a very remote chance of a vaccine causing a severe allergic reaction, other serious injury, or death. 5. What if there is a serious problem?     An allergic reaction could occur after the vaccinated person leaves the clinic. If you see signs of a severe allergic reaction (hives, swelling of the face and throat, difficulty breathing, a fast heartbeat, dizziness, or weakness), call 9-1-1 and get the person to the nearest hospital.    For other signs that concern you, call your health care provider. Adverse reactions should be reported to the Vaccine Adverse Event Reporting System (VAERS). Your health care provider will usually file this report, or you can do it yourself. Visit the VAERS website at www.vaers. [facility].gov or call 4-235.580.9312. VAERS is only for reporting reactions, and VAERS staff members do not give medical advice. 6. The National Vaccine Injury Compensation Program    The [facility] Vaccine Injury Compensation Program (VICP) is a federal program that was created to compensate people who may have been injured by certain vaccines. Claims regarding alleged injury or death due to vaccination have a time limit for filing, which may be as short as two years. Visit the VICP website at www.[facility].gov/vaccinecompensation or call 2-713.746.2976 to learn about the program and about filing a claim. 7. How can I learn more? Ask your health care provider. Call your local or state health department. Visit the website of the Food and Drug Administration (FDA) for vaccine package inserts and additional information at www.fda.gov/vaccines-blood-biologics/vaccines. Contact the Centers for Disease Control and Prevention (CDC): Call 8-940.618.4902 (1-800-CDC-INFO) or  Visit CDCs influenza website at www.cdc.gov/flu. Vaccine Information Statement   Inactivated Influenza Vaccine   8/6/2021  42 U. Cassie Prashanth [id number]   Department of Health and Human Services  Centers for Disease Control and Prevention    Office Use Only

## 2022-11-22 ENCOUNTER — TELEPHONE (OUTPATIENT)
Dept: FAMILY MEDICINE CLINIC | Age: 27
End: 2022-11-22

## 2022-11-22 LAB
ALBUMIN SERPL-MCNC: 3.5 G/DL (ref 3.5–5)
ALBUMIN/GLOB SERPL: 0.9 {RATIO} (ref 1.1–2.2)
ALP SERPL-CCNC: 56 U/L (ref 45–117)
ALT SERPL-CCNC: 27 U/L (ref 12–78)
ANION GAP SERPL CALC-SCNC: 6 MMOL/L (ref 5–15)
AST SERPL-CCNC: 16 U/L (ref 15–37)
BILIRUB SERPL-MCNC: 0.3 MG/DL (ref 0.2–1)
BUN SERPL-MCNC: 11 MG/DL (ref 6–20)
BUN/CREAT SERPL: 18 (ref 12–20)
CALCIUM SERPL-MCNC: 8.4 MG/DL (ref 8.5–10.1)
CHLORIDE SERPL-SCNC: 111 MMOL/L (ref 97–108)
CHOLEST SERPL-MCNC: 146 MG/DL
CO2 SERPL-SCNC: 24 MMOL/L (ref 21–32)
CREAT SERPL-MCNC: 0.61 MG/DL (ref 0.55–1.02)
ERYTHROCYTE [DISTWIDTH] IN BLOOD BY AUTOMATED COUNT: 15.5 % (ref 11.5–14.5)
GLOBULIN SER CALC-MCNC: 3.7 G/DL (ref 2–4)
GLUCOSE SERPL-MCNC: 97 MG/DL (ref 65–100)
HCT VFR BLD AUTO: 36.5 % (ref 35–47)
HDLC SERPL-MCNC: 55 MG/DL
HDLC SERPL: 2.7 {RATIO} (ref 0–5)
HGB BLD-MCNC: 10.6 G/DL (ref 11.5–16)
LDLC SERPL CALC-MCNC: 82.4 MG/DL (ref 0–100)
MCH RBC QN AUTO: 22.4 PG (ref 26–34)
MCHC RBC AUTO-ENTMCNC: 29 G/DL (ref 30–36.5)
MCV RBC AUTO: 77 FL (ref 80–99)
NRBC # BLD: 0 K/UL (ref 0–0.01)
NRBC BLD-RTO: 0 PER 100 WBC
PLATELET # BLD AUTO: 149 K/UL (ref 150–400)
POTASSIUM SERPL-SCNC: 4.1 MMOL/L (ref 3.5–5.1)
PROT SERPL-MCNC: 7.2 G/DL (ref 6.4–8.2)
RBC # BLD AUTO: 4.74 M/UL (ref 3.8–5.2)
SODIUM SERPL-SCNC: 141 MMOL/L (ref 136–145)
TRIGL SERPL-MCNC: 43 MG/DL (ref ?–150)
VLDLC SERPL CALC-MCNC: 8.6 MG/DL
WBC # BLD AUTO: 3.8 K/UL (ref 3.6–11)

## 2022-11-22 NOTE — PROGRESS NOTES
Please call patient  (will need to use interpretor) and let her know that her labs returned:  1.  Hgb is low, appears she is anemic. Has this been chronic issue?   I have sent in iron supplement and she should take as prescribed  Otherwise stable  Should follow up with cardiology as discussed in office  Thanks

## 2022-11-22 NOTE — TELEPHONE ENCOUNTER
----- Message from Linda Hathaway NP sent at 11/22/2022  8:04 AM EST -----  Please call patient  (will need to use interpretor) and let her know that her labs returned:  1.  Hgb is low, appears she is anemic. Has this been chronic issue?   I have sent in iron supplement and she should take as prescribed  Otherwise stable  Should follow up with cardiology as discussed in office  Thanks

## 2022-11-23 LAB — TSH SERPL-ACNC: 1.02 UIU/ML (ref 0.45–4.5)

## 2023-04-07 ENCOUNTER — APPOINTMENT (OUTPATIENT)
Dept: ULTRASOUND IMAGING | Age: 28
End: 2023-04-07
Attending: STUDENT IN AN ORGANIZED HEALTH CARE EDUCATION/TRAINING PROGRAM
Payer: MEDICAID

## 2023-04-07 ENCOUNTER — HOSPITAL ENCOUNTER (OUTPATIENT)
Age: 28
End: 2023-04-07
Attending: STUDENT IN AN ORGANIZED HEALTH CARE EDUCATION/TRAINING PROGRAM
Payer: MEDICAID

## 2023-04-20 ENCOUNTER — TELEPHONE (OUTPATIENT)
Dept: OBGYN CLINIC | Age: 28
End: 2023-04-20

## 2023-04-20 NOTE — TELEPHONE ENCOUNTER
[id number] [facility] calling she states they faxed over lab results on this patient patient is O positive with positive antibodies

## 2023-04-25 ENCOUNTER — OFFICE VISIT (OUTPATIENT)
Dept: FAMILY MEDICINE CLINIC | Age: 28
End: 2023-04-25
Payer: MEDICAID

## 2023-04-25 VITALS
OXYGEN SATURATION: 99 % | HEART RATE: 70 BPM | SYSTOLIC BLOOD PRESSURE: 96 MMHG | BODY MASS INDEX: 30.55 KG/M2 | TEMPERATURE: 97 F | HEIGHT: 63 IN | DIASTOLIC BLOOD PRESSURE: 58 MMHG | WEIGHT: 172.4 LBS | RESPIRATION RATE: 16 BRPM

## 2023-04-25 DIAGNOSIS — Z3A.12 12 WEEKS GESTATION OF PREGNANCY: ICD-10-CM

## 2023-04-25 DIAGNOSIS — R30.0 DYSURIA: Primary | ICD-10-CM

## 2023-04-25 LAB
BILIRUB UR QL STRIP: NEGATIVE
GLUCOSE UR-MCNC: NEGATIVE MG/DL
KETONES P FAST UR STRIP-MCNC: NEGATIVE MG/DL
PH UR STRIP: 7 [PH] (ref 4.6–8)
PROT UR QL STRIP: NEGATIVE
SP GR UR STRIP: 1.01 (ref 1–1.03)
UA UROBILINOGEN AMB POC: NORMAL (ref 0.2–1)
URINALYSIS CLARITY POC: CLEAR
URINALYSIS COLOR POC: YELLOW
URINE BLOOD POC: NEGATIVE
URINE LEUKOCYTES POC: NORMAL
URINE NITRITES POC: NEGATIVE

## 2023-04-25 PROCEDURE — 99213 OFFICE O/P EST LOW 20 MIN: CPT | Performed by: INTERNAL MEDICINE

## 2023-04-25 PROCEDURE — 81002 URINALYSIS NONAUTO W/O SCOPE: CPT | Performed by: INTERNAL MEDICINE

## 2023-04-25 RX ORDER — AMOXICILLIN AND CLAVULANATE POTASSIUM 875; 125 MG/1; MG/1
1 TABLET, FILM COATED ORAL EVERY 12 HOURS
Qty: 20 TABLET | Refills: 0 | Status: SHIPPED | OUTPATIENT
Start: 2023-04-25 | End: 2023-05-05

## 2023-04-25 NOTE — PROGRESS NOTES
Chief Complaint   Patient presents with    Hospital Follow Up     Pt was seen in the ER for dizziness and weakness for concerns about possible miscarriage. She did not have a miscarriage and she is now atient is now 3-months pregnant. Bladder Infection     Patient has a UTI but was not able to find the pharmacy to take the medication          Assessment/ Plan:   Diagnoses and all orders for this visit:    1. Dysuria  -     AMB POC URINALYSIS DIP STICK MANUAL W/O MICRO  -     CULTURE, URINE; Future  -     amoxicillin-clavulanate (AUGMENTIN) 875-125 mg per tablet; Take 1 Tablet by mouth every twelve (12) hours for 10 days. Results for orders placed or performed in visit on 04/25/23   AMB POC URINALYSIS DIP STICK MANUAL W/O MICRO   Result Value Ref Range    Color (UA POC) Yellow     Clarity (UA POC) Clear     Glucose (UA POC) Negative Negative    Bilirubin (UA POC) Negative Negative    Ketones (UA POC) Negative Negative    Specific gravity (UA POC) 1.015 1.001 - 1.035    Blood (UA POC) Negative Negative    pH (UA POC) 7.0 4.6 - 8.0    Protein (UA POC) Negative Negative    Urobilinogen (UA POC) 0.2 mg/dL 0.2 - 1    Nitrites (UA POC) Negative Negative    Leukocyte esterase (UA POC) Trace Negative     Will send for culture. 2. 12 weeks gestation of pregnancy  -     prenatal vit-iron fumarate-fa 27 mg iron- 0.8 mg tab tablet; Take 1 Tablet by mouth daily.  - Reviewed her up coming OB/GYN appointment, date time and location. Printed on AVS.      I have discussed the diagnosis with the patient and the intended treatment plan as seen in the above orders. The patient has received an after-visit summary and questions were answered concerning future plans. Asked to return should symptoms worsen or not improve with treatment. Any pending labs and studies will be relayed to patient when they become available. Pt verbalizes understanding of plan of care and denies further questions or concerns at this time. Follow-up and Dispositions    Return if symptoms worsen or fail to improve. Subjective:  services was used for this visit. Patient speaks Tito Handler and minimal English:    Daisy Vasquez is a 28 y.o. female who presents today for follow up after recent ER visit for dizziness weakness. There also had been some concern about possible miscarriage. She also was evaluated with a urinalysis and there was concern about UTI. She was given prescription for antibiotic, but did not know which pharmacy the medication had been sent too and so, has not started it yet. She also is 3-months pregnant and has not had a follow up with gynecologist yet. She is due for gynecological evaluation on May 2, 2023 at [facility]. The patient is not on any prenatal vitamins. This is her 5th pregnancy. She has 4-children at home. The eldest is 8years old. She feels OK, but has no appetite. Still having some burning with urination. We will repeat the UA dip in the office. HISTORICAL  PMH, PSH, FHX, SOCHX, ALLERGIES and MES were reviewed and updated today. Review of Systems  Review of Systems   Constitutional:  Positive for malaise/fatigue. Gastrointestinal:         Decreased appetite, but no nausea. Genitourinary:  Positive for dysuria. Objective:     Vitals:    04/25/23 1033   BP: (!) 96/58   Pulse: 70   Resp: 16   Temp: 97 °F (36.1 °C)   TempSrc: Temporal   SpO2: 99%   Weight: 172 lb 6.4 oz (78.2 kg)   Height: 5' 3\" (1.6 m)       Physical Exam  Vitals and nursing note reviewed. Constitutional:       Appearance: Normal appearance. Comments: Appears fatigued     HENT:      Head: Normocephalic and atraumatic. Mouth/Throat:      Mouth: Mucous membranes are moist.   Eyes:      Extraocular Movements: Extraocular movements intact. Conjunctiva/sclera: Conjunctivae normal.      Pupils: Pupils are equal, round, and reactive to light.    Cardiovascular:      Rate and Rhythm: Normal rate and regular rhythm. Pulses: Normal pulses. Heart sounds: Normal heart sounds. Pulmonary:      Effort: Pulmonary effort is normal.      Breath sounds: Normal breath sounds. Musculoskeletal:      Cervical back: Normal range of motion and neck supple. Neurological:      General: No focal deficit present. Mental Status: She is alert. Mental status is at baseline. Cranial Nerves: No cranial nerve deficit. Sensory: No sensory deficit. Motor: No weakness. Coordination: Coordination normal.      Gait: Gait normal.      Deep Tendon Reflexes: Reflexes normal.   Psychiatric:         Mood and Affect: Mood normal.         Behavior: Behavior normal.         Thought Content:  Thought content normal.         Judgment: Judgment normal.       Nellie Hernandez MD  [facility]   04/25/2023

## 2023-04-25 NOTE — PROGRESS NOTES
Identified pt with two pt identifiers(name and ). Chief Complaint   Patient presents with    Hospital Follow Up     Pt was seen in the ER for dizziness and weakness for miscarriage. Patient is pregnant now. 3 months     Bladder Infection     Patient has a UTI but was not able to find the pharmacy to take the medication         Health Maintenance Due   Topic    COVID-19 Vaccine (1)    Varicella Vaccine (1 of 2 - 2-dose childhood series)    Pap Smear        Wt Readings from Last 3 Encounters:   23 172 lb 6.4 oz (78.2 kg)   23 177 lb 4 oz (80.4 kg)   22 197 lb (89.4 kg)     Temp Readings from Last 3 Encounters:   23 97 °F (36.1 °C) (Temporal)   23 97.7 °F (36.5 °C)   22 97.8 °F (36.6 °C) (Temporal)     BP Readings from Last 3 Encounters:   23 (!) 96/58   23 112/68   22 136/80     Pulse Readings from Last 3 Encounters:   23 70   23 72   22 72         Learning Assessment:  :     Learning Assessment 2020   PRIMARY LEARNER Patient   BARRIERS PRIMARY LEARNER LANGUAGE   PRIMARY LANGUAGE OTHER (COMMENT)   LEARNER PREFERENCE PRIMARY READING     VIDEOS   ANSWERED BY patient   RELATIONSHIP SELF       Depression Screening:  :     3 most recent PHQ Screens 2023   Little interest or pleasure in doing things Not at all   Feeling down, depressed, irritable, or hopeless Not at all   Total Score PHQ 2 0       Fall Risk Assessment:  :     No flowsheet data found. Abuse Screening:  :     Abuse Screening Questionnaire 2022   Do you ever feel afraid of your partner? N N N   Are you in a relationship with someone who physically or mentally threatens you? N N N   Is it safe for you to go home? Vida Lazo       Coordination of Care Questionnaire:  :     1) Have you been to an emergency room, urgent care clinic since your last visit?  yes   Hospitalized since your last visit? no             2) Have you seen or consulted any other health care providers outside of [address] [place] [address] since your last visit? no  (Include any pap smears or colon screenings in this section.)    3) Do you have an Advance Directive on file? no  Are you interested in receiving information about Advance Directives? no    Patient is accompanied by self I have received verbal consent from [address] [place] [address] to discuss any/all medical information while they are present in the room. 4.  For patients aged 39-70: Has the patient had a colonoscopy / FIT/ Cologuard? NA - based on age      If the patient is female:    11. For patients aged 41-77: Has the patient had a mammogram within the past 2 years? NA - based on age or sex      10. For patients aged 21-65: Has the patient had a pap smear?  No

## 2023-05-02 ENCOUNTER — OFFICE VISIT (OUTPATIENT)
Dept: OBGYN CLINIC | Age: 28
End: 2023-05-02
Payer: MEDICAID

## 2023-05-02 VITALS — SYSTOLIC BLOOD PRESSURE: 105 MMHG | WEIGHT: 175 LBS | DIASTOLIC BLOOD PRESSURE: 62 MMHG | BODY MASS INDEX: 31 KG/M2

## 2023-05-02 DIAGNOSIS — Z87.59 HISTORY OF MISCARRIAGE: Primary | ICD-10-CM

## 2023-05-02 DIAGNOSIS — N92.6 MISSED MENSES: ICD-10-CM

## 2023-05-02 PROBLEM — Z34.90 PREGNANCY: Status: ACTIVE | Noted: 2023-05-02

## 2023-05-02 PROBLEM — Z3A.24 24 WEEKS GESTATION OF PREGNANCY: Status: RESOLVED | Noted: 2021-06-28 | Resolved: 2023-05-02

## 2023-05-02 PROBLEM — O45.90 PLACENTAL ABRUPTION: Status: RESOLVED | Noted: 2021-09-30 | Resolved: 2023-05-02

## 2023-05-02 PROCEDURE — MISCGLOBALOB GLOBAL OB: Performed by: OBSTETRICS & GYNECOLOGY

## 2023-05-03 LAB
CREAT UR-MCNC: 78.7 MG/DL
PROT UR-MCNC: 15 MG/DL (ref 0–11.9)
PROT/CREAT UR-RTO: 0.2

## 2023-05-05 LAB
2ND TRIMESTER 4 SCREEN SERPL-IMP: NORMAL
2ND TRIMESTER 4 SCREEN SERPL-IMP: NORMAL
AFP ADJ MOM SERPL: 0.92
AFP SERPL-MCNC: 31.9 NG/ML
AGE AT DELIVERY: 29 YR
FET TS 18 RISK FROM MAT AGE: NORMAL
FET TS 21 RISK FROM MAT AGE: 781
GA METHOD: NORMAL
GA: 16.1 WEEKS
HCG ADJ MOM SERPL: 1.43
HCG SERPL-ACNC: NORMAL MIU/ML
IDDM PATIENT QL: NORMAL
INHIBIN A ADJ MOM SERPL: 0.73
INHIBIN A SERPL-MCNC: 103.04 PG/ML
LABORATORY COMMENT REPORT: NORMAL
MULTIPLE PREGNANCY: NORMAL
NEURAL TUBE DEFECT RISK FETUS: NORMAL
RESULTS, 017389: NORMAL
TS 18 RISK FETUS: NORMAL
TS 21 RISK FETUS: 587
U ESTRIOL ADJ MOM SERPL: 0.5
U ESTRIOL SERPL-MCNC: 0.46 NG/ML

## 2023-05-30 ENCOUNTER — ROUTINE PRENATAL (OUTPATIENT)
Age: 28
End: 2023-05-30

## 2023-05-30 VITALS — DIASTOLIC BLOOD PRESSURE: 60 MMHG | BODY MASS INDEX: 31 KG/M2 | WEIGHT: 175 LBS | SYSTOLIC BLOOD PRESSURE: 104 MMHG

## 2023-05-30 DIAGNOSIS — Z34.82 PRENATAL CARE, SUBSEQUENT PREGNANCY IN SECOND TRIMESTER: Primary | ICD-10-CM

## 2023-05-30 DIAGNOSIS — Z34.90 PREGNANCY, UNSPECIFIED GESTATIONAL AGE: ICD-10-CM

## 2023-05-30 LAB
HBV SURFACE AG SER QL: <0.1 INDEX
HBV SURFACE AG SER QL: NEGATIVE
HCV AB SERPL QL IA: NONREACTIVE
HIV 1+2 AB+HIV1 P24 AG SERPL QL IA: NONREACTIVE
HIV 1/2 RESULT COMMENT: NORMAL
RUBV IGG SERPL IA-ACNC: NORMAL IU/ML

## 2023-05-30 PROCEDURE — 0502F SUBSEQUENT PRENATAL CARE: CPT | Performed by: OBSTETRICS & GYNECOLOGY

## 2023-05-30 NOTE — PROGRESS NOTES
Feeling well overall,  Reviewed US findings of previa; fu US 28wk; precautions; used     FETAL SURVEY  A SINGLE VIABLE IUP AT 20W2D IS SEEN. FETAL CARDIAC MOTION OBSERVED. FETAL ANATOMY WAS WELL VISUALIZED AND APPEARS WNL. NO ABNORMALITIES WERE SEEN ON TODAYS EXAM.  APPROPRIATE GROWTH MEASURED. SIZE = DATES. MARY AND CERVIX APPEAR WITHIN NORMAL LIMITS. THE PLACENTA APPEARS TO BE COVERING THE INTERNAL  CERVICAL OS BY 1.3CM. GENDER: FEMALE.

## 2023-05-30 NOTE — CONSULTS
Session ID: [id number]  Request ID: [id number]  Language: Swahili  Status: [address] [place] [address] ID: #[id number]   Name: Rayray Pierce ID: [id number]  Language: Swahili  Status: Final Call Action                Failed to Match Action: OPI

## 2023-05-31 LAB
T PALLIDUM AB SER QL IA: NON REACTIVE
VZV IGG SER IA-ACNC: 2801 INDEX

## 2023-06-01 LAB
HGB A MFR BLD: 96.5 % (ref 96.4–98.8)
HGB A2 MFR BLD COLUMN CHROM: 2.6 % (ref 1.8–3.2)
HGB F MFR BLD: 0.9 % (ref 0–2)
HGB FRACT BLD-IMP: NORMAL
HGB S MFR BLD: 0 %

## 2023-06-02 LAB
BACTERIA SPEC CULT: ABNORMAL
C TRACH RRNA SPEC QL NAA+PROBE: NEGATIVE
CC UR VC: ABNORMAL
N GONORRHOEA RRNA SPEC QL NAA+PROBE: NEGATIVE
SERVICE CMNT-IMP: ABNORMAL
T VAGINALIS RRNA SPEC QL NAA+PROBE: NEGATIVE

## 2023-06-02 RX ORDER — AMOXICILLIN 500 MG/1
500 CAPSULE ORAL 2 TIMES DAILY
Qty: 14 CAPSULE | Refills: 0 | Status: SHIPPED | OUTPATIENT
Start: 2023-06-02 | End: 2023-06-09

## 2023-06-27 ENCOUNTER — ROUTINE PRENATAL (OUTPATIENT)
Age: 28
End: 2023-06-27

## 2023-06-27 VITALS — DIASTOLIC BLOOD PRESSURE: 56 MMHG | WEIGHT: 180 LBS | SYSTOLIC BLOOD PRESSURE: 98 MMHG | BODY MASS INDEX: 31.89 KG/M2

## 2023-06-27 DIAGNOSIS — Z34.90 PREGNANCY, UNSPECIFIED GESTATIONAL AGE: ICD-10-CM

## 2023-06-27 DIAGNOSIS — Z34.82 PRENATAL CARE, SUBSEQUENT PREGNANCY IN SECOND TRIMESTER: Primary | ICD-10-CM

## 2023-06-27 PROCEDURE — 0502F SUBSEQUENT PRENATAL CARE: CPT | Performed by: OBSTETRICS & GYNECOLOGY

## 2023-06-28 LAB
BACTERIA SPEC CULT: NORMAL
SERVICE CMNT-IMP: NORMAL

## 2023-07-13 ENCOUNTER — HOSPITAL ENCOUNTER (EMERGENCY)
Facility: HOSPITAL | Age: 28
Discharge: HOME | End: 2023-07-13
Attending: OBSTETRICS & GYNECOLOGY | Admitting: OBSTETRICS & GYNECOLOGY

## 2023-07-13 ENCOUNTER — APPOINTMENT (OUTPATIENT)
Facility: HOSPITAL | Age: 28
End: 2023-07-13

## 2023-07-13 VITALS
RESPIRATION RATE: 18 BRPM | SYSTOLIC BLOOD PRESSURE: 109 MMHG | DIASTOLIC BLOOD PRESSURE: 57 MMHG | HEART RATE: 63 BPM | TEMPERATURE: 98 F

## 2023-07-13 LAB
ALBUMIN SERPL-MCNC: 2.8 G/DL (ref 3.5–5)
ALBUMIN/GLOB SERPL: 0.7 (ref 1.1–2.2)
ALP SERPL-CCNC: 56 U/L (ref 45–117)
ALT SERPL-CCNC: 19 U/L (ref 12–78)
AMYLASE SERPL-CCNC: 89 U/L (ref 25–115)
ANION GAP SERPL CALC-SCNC: 7 MMOL/L (ref 5–15)
APPEARANCE UR: CLEAR
AST SERPL-CCNC: 19 U/L (ref 15–37)
BACTERIA URNS QL MICRO: NEGATIVE /HPF
BASOPHILS # BLD: 0 K/UL (ref 0–0.1)
BASOPHILS NFR BLD: 0 % (ref 0–1)
BILIRUB SERPL-MCNC: 0.3 MG/DL (ref 0.2–1)
BILIRUB UR QL: NEGATIVE
BUN SERPL-MCNC: 7 MG/DL (ref 6–20)
BUN/CREAT SERPL: 15 (ref 12–20)
CALCIUM SERPL-MCNC: 8.9 MG/DL (ref 8.5–10.1)
CHLORIDE SERPL-SCNC: 110 MMOL/L (ref 97–108)
CO2 SERPL-SCNC: 20 MMOL/L (ref 21–32)
COLOR UR: ABNORMAL
CREAT SERPL-MCNC: 0.47 MG/DL (ref 0.55–1.02)
DIFFERENTIAL METHOD BLD: ABNORMAL
EOSINOPHIL # BLD: 0.1 K/UL (ref 0–0.4)
EOSINOPHIL NFR BLD: 1 % (ref 0–7)
EPITH CASTS URNS QL MICRO: ABNORMAL /LPF
ERYTHROCYTE [DISTWIDTH] IN BLOOD BY AUTOMATED COUNT: 14.1 % (ref 11.5–14.5)
GLOBULIN SER CALC-MCNC: 3.9 G/DL (ref 2–4)
GLUCOSE SERPL-MCNC: 112 MG/DL (ref 65–100)
GLUCOSE UR STRIP.AUTO-MCNC: NEGATIVE MG/DL
HCT VFR BLD AUTO: 31 % (ref 35–47)
HGB BLD-MCNC: 9.3 G/DL (ref 11.5–16)
HGB UR QL STRIP: NEGATIVE
IMM GRANULOCYTES # BLD AUTO: 0.1 K/UL (ref 0–0.04)
IMM GRANULOCYTES NFR BLD AUTO: 1 % (ref 0–0.5)
KETONES UR QL STRIP.AUTO: NEGATIVE MG/DL
LEUKOCYTE ESTERASE UR QL STRIP.AUTO: ABNORMAL
LIPASE SERPL-CCNC: 127 U/L (ref 73–393)
LYMPHOCYTES # BLD: 0.6 K/UL (ref 0.8–3.5)
LYMPHOCYTES NFR BLD: 11 % (ref 12–49)
MCH RBC QN AUTO: 23 PG (ref 26–34)
MCHC RBC AUTO-ENTMCNC: 30 G/DL (ref 30–36.5)
MCV RBC AUTO: 76.7 FL (ref 80–99)
MONOCYTES # BLD: 0.3 K/UL (ref 0–1)
MONOCYTES NFR BLD: 6 % (ref 5–13)
NEUTS SEG # BLD: 4.3 K/UL (ref 1.8–8)
NEUTS SEG NFR BLD: 81 % (ref 32–75)
NITRITE UR QL STRIP.AUTO: NEGATIVE
NRBC # BLD: 0 K/UL (ref 0–0.01)
NRBC BLD-RTO: 0 PER 100 WBC
PH UR STRIP: 8.5 (ref 5–8)
PLATELET # BLD AUTO: 129 K/UL (ref 150–400)
POTASSIUM SERPL-SCNC: 4.2 MMOL/L (ref 3.5–5.1)
PROT SERPL-MCNC: 6.7 G/DL (ref 6.4–8.2)
PROT UR STRIP-MCNC: NEGATIVE MG/DL
RBC # BLD AUTO: 4.04 M/UL (ref 3.8–5.2)
RBC #/AREA URNS HPF: ABNORMAL /HPF (ref 0–5)
RBC MORPH BLD: ABNORMAL
SODIUM SERPL-SCNC: 137 MMOL/L (ref 136–145)
SP GR UR REFRACTOMETRY: 1.01 (ref 1–1.03)
URINE CULTURE IF INDICATED: ABNORMAL
UROBILINOGEN UR QL STRIP.AUTO: 0.2 EU/DL (ref 0.2–1)
WBC # BLD AUTO: 5.4 K/UL (ref 3.6–11)
WBC URNS QL MICRO: ABNORMAL /HPF (ref 0–4)

## 2023-07-13 PROCEDURE — 83690 ASSAY OF LIPASE: CPT

## 2023-07-13 PROCEDURE — 80053 COMPREHEN METABOLIC PANEL: CPT

## 2023-07-13 PROCEDURE — 99283 EMERGENCY DEPT VISIT LOW MDM: CPT

## 2023-07-13 PROCEDURE — 82150 ASSAY OF AMYLASE: CPT

## 2023-07-13 PROCEDURE — 81001 URINALYSIS AUTO W/SCOPE: CPT

## 2023-07-13 PROCEDURE — 36415 COLL VENOUS BLD VENIPUNCTURE: CPT

## 2023-07-13 PROCEDURE — 4500000201 HC EMERGENCY DEPT VISIT NO LEVEL OF CARE

## 2023-07-13 PROCEDURE — 85025 COMPLETE CBC W/AUTO DIFF WBC: CPT

## 2023-07-13 PROCEDURE — 6370000000 HC RX 637 (ALT 250 FOR IP): Performed by: OBSTETRICS & GYNECOLOGY

## 2023-07-13 PROCEDURE — 76705 ECHO EXAM OF ABDOMEN: CPT

## 2023-07-13 RX ORDER — ACETAMINOPHEN 500 MG
1000 TABLET ORAL ONCE
Status: COMPLETED | OUTPATIENT
Start: 2023-07-13 | End: 2023-07-13

## 2023-07-13 RX ORDER — CALCIUM CARBONATE 500 MG/1
1000 TABLET, CHEWABLE ORAL ONCE
Status: COMPLETED | OUTPATIENT
Start: 2023-07-13 | End: 2023-07-13

## 2023-07-13 RX ADMIN — CALCIUM CARBONATE 1000 MG: 500 TABLET, CHEWABLE ORAL at 18:34

## 2023-07-13 RX ADMIN — ACETAMINOPHEN 1000 MG: 500 TABLET ORAL at 18:34

## 2023-07-13 ASSESSMENT — ENCOUNTER SYMPTOMS: ABDOMINAL PAIN: 1

## 2023-07-13 NOTE — ED NOTES
29 yo  @ 26w3d who presents with complaint of upper abdominal pain. Started around 4 PM. She hasn't tried anything for it. She works at [employer] and noticed it at work. It didn't get better so she left work went home and took a shower. When it didn't improve she came up. No N/V. Good fetal movement. She describes it as going back and forth across the top of her stomach. It's not like contractions    Primary Provider:  Coni Brown; prior IUFD pregn w CNM    [facility] by 1st US   From [place]; needs , [employer]  grandmultip  Placenta previa; 29 and 32wk US__  Hx 5 FT/ in [facility];tested 4kg; kids now ages 15yo, 10yo, 12yo, 9 and   7yo  Hx 37wk IUFD abruption/ IUGR/ severe PIH  1696 complicated by PPH, Bakri,   blood transfusion; labs-nl creat/LFT __; low dose aspirin; PCR 0.2  IOB labs:  UTI 6/3/2023  TOC___  Genetic Screening: declines panorama;   AFP tetra nl  Anatomy:nl but PREVIA  GTT:  Flu:__ TDAP:__  Rhogam:  OPositive  GBS:  Circ: The history is provided by the patient. A  was used. Abdominal Pain  Associated symptoms include abdominal pain. No chief complaint on file. Past Medical History:   Diagnosis Date    Routine screening for STI (sexually transmitted infection) 2020    Health Dept labs: Hep B testing with immunity to hepatitis B (reactive sAb, with negative sAg and total core Ab); HIV non-reactive; T pallidum/syphilis non-reactive; HCV negative. GC/CT negative. Screening for iron deficiency anemia 2020    Health Dept labs:  Hgb 11.9 with MCV 77. Remainder of H18 normal.  Chem 8 normal except CO2 18. Screening for tuberculosis 2020    Health Dept labs:  T-spot negative. History reviewed. No pertinent surgical history.       Family History   Problem Relation Age of Onset    No Known Problems Father     No Known Problems Mother        Social History     Socioeconomic History    Marital status:      Spouse name: Not on

## 2023-07-13 NOTE — PROGRESS NOTES
1800 SBAR report received from [address] [place] [address]    [id number] Patient off monitor for abdominal ultrasound

## 2023-07-14 NOTE — PROGRESS NOTES
1- SBAR received from Callie Valladares RN    2010- Lab called and followed up on pending UA, states will result in about 10 minutes    2030-  Dr. Marko Kanner at bedside, labs reviewed with . Pt states feeling better overall at this time, comfortable with POC to discharge home and follow up in ~2 weeks at office. No further questions at this time.      2040- Discharge instructions reviewed with pt, no questions stated

## 2023-08-08 ENCOUNTER — ROUTINE PRENATAL (OUTPATIENT)
Age: 28
End: 2023-08-08
Payer: MEDICAID

## 2023-08-08 VITALS — SYSTOLIC BLOOD PRESSURE: 98 MMHG | DIASTOLIC BLOOD PRESSURE: 60 MMHG | WEIGHT: 180 LBS | BODY MASS INDEX: 31.89 KG/M2

## 2023-08-08 DIAGNOSIS — Z34.83 PRENATAL CARE, SUBSEQUENT PREGNANCY IN THIRD TRIMESTER: Primary | ICD-10-CM

## 2023-08-08 DIAGNOSIS — Z34.90 PREGNANCY, UNSPECIFIED GESTATIONAL AGE: ICD-10-CM

## 2023-08-08 LAB
ALBUMIN SERPL-MCNC: 2.7 G/DL (ref 3.5–5)
ALBUMIN/GLOB SERPL: 0.8 (ref 1.1–2.2)
ALP SERPL-CCNC: 63 U/L (ref 45–117)
ALT SERPL-CCNC: 17 U/L (ref 12–78)
ANION GAP SERPL CALC-SCNC: 8 MMOL/L (ref 5–15)
AST SERPL-CCNC: 17 U/L (ref 15–37)
BILIRUB SERPL-MCNC: 0.5 MG/DL (ref 0.2–1)
BLOOD BANK CMNT PATIENT-IMP: NORMAL
BLOOD GROUP ANTIBODIES SERPL: NORMAL
BLOOD GROUP ANTIBODIES SERPL: NORMAL
BUN SERPL-MCNC: 6 MG/DL (ref 6–20)
BUN/CREAT SERPL: 11 (ref 12–20)
CALCIUM SERPL-MCNC: 8.6 MG/DL (ref 8.5–10.1)
CHLORIDE SERPL-SCNC: 109 MMOL/L (ref 97–108)
CO2 SERPL-SCNC: 21 MMOL/L (ref 21–32)
CREAT SERPL-MCNC: 0.54 MG/DL (ref 0.55–1.02)
ERYTHROCYTE [DISTWIDTH] IN BLOOD BY AUTOMATED COUNT: 14.7 % (ref 11.5–14.5)
GLOBULIN SER CALC-MCNC: 3.6 G/DL (ref 2–4)
GLUCOSE SERPL-MCNC: 98 MG/DL (ref 65–100)
HCT VFR BLD AUTO: 30.4 % (ref 35–47)
HGB BLD-MCNC: 9 G/DL (ref 11.5–16)
MCH RBC QN AUTO: 22.5 PG (ref 26–34)
MCHC RBC AUTO-ENTMCNC: 29.6 G/DL (ref 30–36.5)
MCV RBC AUTO: 76 FL (ref 80–99)
NRBC # BLD: 0 K/UL (ref 0–0.01)
NRBC BLD-RTO: 0 PER 100 WBC
PLATELET # BLD AUTO: 125 K/UL (ref 150–400)
POTASSIUM SERPL-SCNC: 3.4 MMOL/L (ref 3.5–5.1)
PROT SERPL-MCNC: 6.3 G/DL (ref 6.4–8.2)
RBC # BLD AUTO: 4 M/UL (ref 3.8–5.2)
SODIUM SERPL-SCNC: 138 MMOL/L (ref 136–145)
WBC # BLD AUTO: 5 K/UL (ref 3.6–11)

## 2023-08-08 PROCEDURE — 90715 TDAP VACCINE 7 YRS/> IM: CPT | Performed by: OBSTETRICS & GYNECOLOGY

## 2023-08-08 PROCEDURE — 90471 IMMUNIZATION ADMIN: CPT | Performed by: OBSTETRICS & GYNECOLOGY

## 2023-08-08 PROCEDURE — 0502F SUBSEQUENT PRENATAL CARE: CPT | Performed by: OBSTETRICS & GYNECOLOGY

## 2023-08-09 PROBLEM — Z3A.24 24 WEEKS GESTATION OF PREGNANCY: Status: RESOLVED | Noted: 2021-06-28 | Resolved: 2023-05-02

## 2023-08-09 LAB
BLD GP AB SCN TITR SERPL: NORMAL {TITER}
BLD GP AB SCN TITR SERPL: NORMAL {TITER}
T PALLIDUM AB SER QL IA: NON REACTIVE

## 2023-08-10 LAB — GLUCOSE 1H P 100 G GLC PO SERPL-MCNC: 94 MG/DL (ref 65–140)

## 2023-08-15 ENCOUNTER — ROUTINE PRENATAL (OUTPATIENT)
Age: 28
End: 2023-08-15

## 2023-08-15 VITALS — WEIGHT: 181 LBS | BODY MASS INDEX: 32.06 KG/M2 | DIASTOLIC BLOOD PRESSURE: 64 MMHG | SYSTOLIC BLOOD PRESSURE: 100 MMHG

## 2023-08-15 DIAGNOSIS — Z3A.24 24 WEEKS GESTATION OF PREGNANCY: ICD-10-CM

## 2023-08-15 DIAGNOSIS — Z34.90 PREGNANCY, UNSPECIFIED GESTATIONAL AGE: Primary | ICD-10-CM

## 2023-08-15 PROCEDURE — 0502F SUBSEQUENT PRENATAL CARE: CPT | Performed by: OBSTETRICS & GYNECOLOGY

## 2023-08-15 NOTE — PROGRESS NOTES
Doing well overall, no concerns  Active FM  Review labs; reassuring titers of anti E and K; repeat next visit

## 2023-08-29 ENCOUNTER — ROUTINE PRENATAL (OUTPATIENT)
Age: 28
End: 2023-08-29

## 2023-08-29 VITALS — SYSTOLIC BLOOD PRESSURE: 110 MMHG | BODY MASS INDEX: 32.24 KG/M2 | DIASTOLIC BLOOD PRESSURE: 62 MMHG | WEIGHT: 182 LBS

## 2023-08-29 DIAGNOSIS — Z34.90 ENCOUNTER FOR SUPERVISION OF NORMAL PREGNANCY, ANTEPARTUM, UNSPECIFIED GRAVIDITY: ICD-10-CM

## 2023-08-29 DIAGNOSIS — Z34.90 PREGNANCY, UNSPECIFIED GESTATIONAL AGE: Primary | ICD-10-CM

## 2023-08-29 PROCEDURE — 0502F SUBSEQUENT PRENATAL CARE: CPT | Performed by: OBSTETRICS & GYNECOLOGY

## 2023-09-12 ENCOUNTER — ROUTINE PRENATAL (OUTPATIENT)
Age: 28
End: 2023-09-12

## 2023-09-12 VITALS — DIASTOLIC BLOOD PRESSURE: 64 MMHG | BODY MASS INDEX: 32.24 KG/M2 | SYSTOLIC BLOOD PRESSURE: 100 MMHG | WEIGHT: 182 LBS

## 2023-09-12 DIAGNOSIS — Z34.83 PRENATAL CARE, SUBSEQUENT PREGNANCY IN THIRD TRIMESTER: Primary | ICD-10-CM

## 2023-09-12 PROCEDURE — 0502F SUBSEQUENT PRENATAL CARE: CPT | Performed by: OBSTETRICS & GYNECOLOGY

## 2023-09-15 LAB
GP B STREP DNA SPEC QL NAA+PROBE: POSITIVE
SPECIMEN SOURCE: ABNORMAL

## 2023-09-19 ENCOUNTER — ROUTINE PRENATAL (OUTPATIENT)
Age: 28
End: 2023-09-19

## 2023-09-19 VITALS — BODY MASS INDEX: 32.24 KG/M2 | SYSTOLIC BLOOD PRESSURE: 102 MMHG | DIASTOLIC BLOOD PRESSURE: 70 MMHG | WEIGHT: 182 LBS

## 2023-09-19 DIAGNOSIS — Z34.90 PREGNANCY, UNSPECIFIED GESTATIONAL AGE: Primary | ICD-10-CM

## 2023-09-19 PROCEDURE — 0502F SUBSEQUENT PRENATAL CARE: CPT | Performed by: OBSTETRICS & GYNECOLOGY

## 2023-09-20 LAB
BLD GP AB SCN TITR SERPL: NORMAL {TITER}
BLOOD BANK CMNT PATIENT-IMP: NORMAL
BLOOD GROUP ANTIBODIES SERPL: NORMAL
BLOOD GROUP ANTIBODIES SERPL: NORMAL

## 2023-09-21 ENCOUNTER — TELEPHONE (OUTPATIENT)
Age: 28
End: 2023-09-21

## 2023-09-22 ENCOUNTER — ROUTINE PRENATAL (OUTPATIENT)
Age: 28
End: 2023-09-22

## 2023-09-22 VITALS — DIASTOLIC BLOOD PRESSURE: 62 MMHG | HEART RATE: 68 BPM | SYSTOLIC BLOOD PRESSURE: 98 MMHG

## 2023-09-22 DIAGNOSIS — Z3A.36 36 WEEKS GESTATION OF PREGNANCY: Primary | ICD-10-CM

## 2023-09-22 NOTE — PROCEDURES
PATIENT: Sulema Donovan   -  : 1995   -  DOS:2023   -  INTERPRETING PROVIDER:Shameka Mtz,   Indication  ========    isoimmunization    Method  ======    Transabdominal ultrasound examination. View: Suboptimal view: limited by late gestational age    Dating  ======    Previous Ultrasound on: 2023  Type of prior assessment: GA  GA at prior assessment date 15 w + 4 d  GA by previous U/S 39 w + 4 d  ELIA by previous Ultrasound: 10/16/2023  Ultrasound examination on: 2023  GA by U/S based upon: [facility], [place], [facility] by [facility], BPD, Femur, HC  GA by U/S 35 w + 5 d  ELIA by U/S: 10/22/2023  Assigned: based on ultrasound (GA), selected on 2023  Assigned GA 36 w + 4 d  Assigned ELIA: 10/16/2023    Fetal Biometry  ============    Standard  BPD 86.1 mm 34w 5d 14% Hadlock  .0 mm 37w 0d 58% Mundo  .6 mm 35w 5d 9% Hadlock  Cerebellum tr 48.7 mm 36w 1d 39% Hill  .3 mm 37w 0d 72% Hadlock  Femur 68.6 mm 35w 2d 16% Hadlock  Humerus 61.1 mm 35w 2d 43% Mundo  EFW 2,856 g 36w 1d 42% Hadlock  EFW (lb) 6 lb  EFW (oz) 5 oz  EFW by: Hadlock (BPD-HC-AC-FL)  Extended   4.2 mm  Other Structures   bpm    General Evaluation  ==============    Cardiac activity present.  bpm. Fetal movements: visualized. Presentation: cephalic  Placenta: Placental site: posterior  Umbilical cord: Cord vessels: 3 vessel cord. Insertion site: normal insertion  Amniotic fluid: Amount of AF: normal amount. MVP 4.2 cm.  MARY 10.6 cm. Q1 3.7 cm, Q2 0.0 cm, Q3 4.2 cm, Q4 2.8 cm    Fetal Anatomy  ===========    Cranium: normal  Lateral ventricles: normal  Choroid plexus: normal  Midline falx: normal  Cavum septi pellucidi: normal  Cerebellum: normal  Cisterna magna: normal  Lips: NOT VISUALIZED  Profile: normal  Nose: NOT VISUALIZED  4-chamber view: normal  RVOT view: normal  LVOT view: normal  3-vessel view: normal  3-vessel-trachea view: normal  Heart / Thorax  Situs: situs solitus (normal)  Aortic arch view: visualized  Ductal

## 2023-09-29 ENCOUNTER — ROUTINE PRENATAL (OUTPATIENT)
Age: 28
End: 2023-09-29

## 2023-09-29 VITALS — HEART RATE: 78 BPM | SYSTOLIC BLOOD PRESSURE: 100 MMHG | DIASTOLIC BLOOD PRESSURE: 66 MMHG

## 2023-09-29 DIAGNOSIS — E66.09 CLASS 1 OBESITY DUE TO EXCESS CALORIES WITHOUT SERIOUS COMORBIDITY WITH BODY MASS INDEX (BMI) OF 32.0 TO 32.9 IN ADULT: ICD-10-CM

## 2023-09-29 DIAGNOSIS — O36.1130 ISOIMMUNIZATION FROM BLOOD GROUP INCOMPATIBILITY DURING PREGNANCY IN THIRD TRIMESTER, SINGLE OR UNSPECIFIED FETUS: ICD-10-CM

## 2023-09-29 DIAGNOSIS — Z3A.37 37 WEEKS GESTATION OF PREGNANCY: Primary | ICD-10-CM

## 2023-09-29 PROBLEM — E66.811 CLASS 1 OBESITY DUE TO EXCESS CALORIES WITHOUT SERIOUS COMORBIDITY WITH BODY MASS INDEX (BMI) OF 32.0 TO 32.9 IN ADULT: Status: ACTIVE | Noted: 2023-09-29

## 2023-09-29 NOTE — PROCEDURES
PATIENT: Ritika Backer   -  : 1995   -  DOS:2023   -  INTERPRETING PROVIDER:Charito Smith,   Indication  ========    isoimmunization    Method  ======    Transabdominal ultrasound examination. View: Sufficient    Pregnancy  =========    Alcantar pregnancy. Number of fetuses: 1    Dating  ======    Previous Ultrasound on: 2023  Type of prior assessment: GA  GA at prior assessment date 12 w + 4 d  GA by previous U/S 40 w + 4 d  ELIA by previous Ultrasound: 10/16/2023  Assigned: based on ultrasound (GA), selected on 2023  Assigned GA 40 w + 4 d  Assigned ELIA: 10/16/2023    General Evaluation  ==============    Cardiac activity present.  bpm. Fetal movements: visualized. Presentation: Cephalic  Placenta: Placental site: posterior  Umbilical cord: Cord vessels: 3 vessel cord    Amniotic Fluid Assessment  =====================    Amount of AF: normal amount  MVP 6.1 cm. MARY 17.1 cm. Q1 6.1 cm, Q2 5.0 cm, Q3 3.5 cm, Q4 2.5 cm    Biophysical Profile  ==============    2: Fetal breathing movements  2: Gross body movements  2: Fetal tone  2: Amniotic fluid volume  /8 Biophysical profile score    Fetal Doppler  ===========    Arterial  Rt MCA PI 2.26 94% Ebbing  Rt MCA RI 0.89 >99% Bahlmann  Rt MCA PS 69.10 cm/s  Rt MCA ED 7.52 cm/s  Rt MCA TAmax 27.27 cm/s 56% Ebbing  Rt MCA MD 7.17 cm/s  Rt MCA S / D 9.19  Rt MCA  bpm  Lt MCA PI 2.37 97% Ebbing  Lt MCA RI 0.89 >99% Bahlmann  Lt MCA PS 75.48 cm/s  Lt MCA ED 9.56 cm/s  Lt MCA TAmax 29.25 cm/s 67% Ebbing  Lt MCA MD 9.27 cm/s  Lt MCA S / D 9.16  Lt MCA  bpm    Findings  =======    Biophysical Profile without NST (54378)    Please see biophysical profile score noted above. Fetal movement and fluid volume appear normal.    Normal MCA dopplers suggest no fetal anemia. Plan of Care  ==========     [facility] #[id number] was used to conduct this visit.     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

## 2023-10-03 ENCOUNTER — TELEPHONE (OUTPATIENT)
Age: 28
End: 2023-10-03

## 2023-10-03 NOTE — TELEPHONE ENCOUNTER
Using Swahili interpretor, attempted to call patient to check in on her status and she has missed two appointments. Provided patient with call back number.

## 2023-10-13 ENCOUNTER — HOSPITAL ENCOUNTER (INPATIENT)
Facility: HOSPITAL | Age: 28
LOS: 2 days | Discharge: HOME | DRG: 560 | End: 2023-10-15
Attending: OBSTETRICS & GYNECOLOGY | Admitting: OBSTETRICS & GYNECOLOGY
Payer: MEDICAID

## 2023-10-13 PROBLEM — O26.899 ABDOMINAL PAIN AFFECTING PREGNANCY: Status: ACTIVE | Noted: 2023-10-13

## 2023-10-13 PROBLEM — R10.9 ABDOMINAL PAIN AFFECTING PREGNANCY: Status: ACTIVE | Noted: 2023-10-13

## 2023-10-13 LAB
ERYTHROCYTE [DISTWIDTH] IN BLOOD BY AUTOMATED COUNT: 17 % (ref 11.5–14.5)
HCT VFR BLD AUTO: 32.7 % (ref 35–47)
HGB BLD-MCNC: 9.7 G/DL (ref 11.5–16)
MCH RBC QN AUTO: 20.9 PG (ref 26–34)
MCHC RBC AUTO-ENTMCNC: 29.7 G/DL (ref 30–36.5)
MCV RBC AUTO: 70.5 FL (ref 80–99)
NRBC # BLD: 0 K/UL (ref 0–0.01)
NRBC BLD-RTO: 0 PER 100 WBC
PLATELET # BLD AUTO: 129 K/UL (ref 150–400)
RBC # BLD AUTO: 4.64 M/UL (ref 3.8–5.2)
WBC # BLD AUTO: 5.7 K/UL (ref 3.6–11)

## 2023-10-13 PROCEDURE — 6360000002 HC RX W HCPCS: Performed by: OBSTETRICS & GYNECOLOGY

## 2023-10-13 PROCEDURE — 7100000001 HC PACU RECOVERY - ADDTL 15 MIN

## 2023-10-13 PROCEDURE — 99284 EMERGENCY DEPT VISIT MOD MDM: CPT

## 2023-10-13 PROCEDURE — 1120000000 HC RM PRIVATE OB

## 2023-10-13 PROCEDURE — 86850 RBC ANTIBODY SCREEN: CPT

## 2023-10-13 PROCEDURE — 86644 CMV ANTIBODY: CPT

## 2023-10-13 PROCEDURE — 59400 OBSTETRICAL CARE: CPT | Performed by: OBSTETRICS & GYNECOLOGY

## 2023-10-13 PROCEDURE — 2580000003 HC RX 258: Performed by: OBSTETRICS & GYNECOLOGY

## 2023-10-13 PROCEDURE — 86870 RBC ANTIBODY IDENTIFICATION: CPT

## 2023-10-13 PROCEDURE — 86921 COMPATIBILITY TEST INCUBATE: CPT

## 2023-10-13 PROCEDURE — 7100000000 HC PACU RECOVERY - FIRST 15 MIN

## 2023-10-13 PROCEDURE — 7210000100 HC LABOR FEE PER 1 HR

## 2023-10-13 PROCEDURE — 86902 BLOOD TYPE ANTIGEN DONOR EA: CPT

## 2023-10-13 PROCEDURE — 86901 BLOOD TYPING SEROLOGIC RH(D): CPT

## 2023-10-13 PROCEDURE — 6370000000 HC RX 637 (ALT 250 FOR IP): Performed by: OBSTETRICS & GYNECOLOGY

## 2023-10-13 PROCEDURE — 4500000002 HC ER NO CHARGE

## 2023-10-13 PROCEDURE — 86880 COOMBS TEST DIRECT: CPT

## 2023-10-13 PROCEDURE — 86920 COMPATIBILITY TEST SPIN: CPT

## 2023-10-13 PROCEDURE — 10907ZC DRAINAGE OF AMNIOTIC FLUID, THERAPEUTIC FROM PRODUCTS OF CONCEPTION, VIA NATURAL OR ARTIFICIAL OPENING: ICD-10-PCS | Performed by: OBSTETRICS & GYNECOLOGY

## 2023-10-13 PROCEDURE — 85027 COMPLETE CBC AUTOMATED: CPT

## 2023-10-13 PROCEDURE — 7220000101 HC DELIVERY VAGINAL/SINGLE

## 2023-10-13 PROCEDURE — 36415 COLL VENOUS BLD VENIPUNCTURE: CPT

## 2023-10-13 PROCEDURE — 86900 BLOOD TYPING SEROLOGIC ABO: CPT

## 2023-10-13 PROCEDURE — 86922 COMPATIBILITY TEST ANTIGLOB: CPT

## 2023-10-13 RX ORDER — LIDOCAINE HYDROCHLORIDE 10 MG/ML
INJECTION, SOLUTION INFILTRATION; PERINEURAL
Status: DISCONTINUED
Start: 2023-10-13 | End: 2023-10-13 | Stop reason: WASHOUT

## 2023-10-13 RX ORDER — SODIUM CHLORIDE 0.9 % (FLUSH) 0.9 %
5-40 SYRINGE (ML) INJECTION PRN
Status: DISCONTINUED | OUTPATIENT
Start: 2023-10-13 | End: 2023-10-15 | Stop reason: HOSPADM

## 2023-10-13 RX ORDER — SODIUM CHLORIDE 9 MG/ML
INJECTION, SOLUTION INTRAVENOUS PRN
Status: DISCONTINUED | OUTPATIENT
Start: 2023-10-13 | End: 2023-10-15 | Stop reason: HOSPADM

## 2023-10-13 RX ORDER — ONDANSETRON 4 MG/1
8 TABLET, ORALLY DISINTEGRATING ORAL EVERY 8 HOURS PRN
Status: DISCONTINUED | OUTPATIENT
Start: 2023-10-13 | End: 2023-10-15 | Stop reason: HOSPADM

## 2023-10-13 RX ORDER — SODIUM CHLORIDE, SODIUM LACTATE, POTASSIUM CHLORIDE, AND CALCIUM CHLORIDE .6; .31; .03; .02 G/100ML; G/100ML; G/100ML; G/100ML
1000 INJECTION, SOLUTION INTRAVENOUS PRN
Status: DISCONTINUED | OUTPATIENT
Start: 2023-10-13 | End: 2023-10-15 | Stop reason: HOSPADM

## 2023-10-13 RX ORDER — ACETAMINOPHEN 500 MG
1000 TABLET ORAL EVERY 8 HOURS SCHEDULED
Status: DISCONTINUED | OUTPATIENT
Start: 2023-10-13 | End: 2023-10-15 | Stop reason: HOSPADM

## 2023-10-13 RX ORDER — CARBOPROST TROMETHAMINE 250 UG/ML
250 INJECTION, SOLUTION INTRAMUSCULAR PRN
Status: DISCONTINUED | OUTPATIENT
Start: 2023-10-13 | End: 2023-10-15 | Stop reason: HOSPADM

## 2023-10-13 RX ORDER — SODIUM CHLORIDE, SODIUM LACTATE, POTASSIUM CHLORIDE, CALCIUM CHLORIDE 600; 310; 30; 20 MG/100ML; MG/100ML; MG/100ML; MG/100ML
INJECTION, SOLUTION INTRAVENOUS CONTINUOUS
Status: DISCONTINUED | OUTPATIENT
Start: 2023-10-13 | End: 2023-10-15 | Stop reason: HOSPADM

## 2023-10-13 RX ORDER — ONDANSETRON 2 MG/ML
4 INJECTION INTRAMUSCULAR; INTRAVENOUS EVERY 6 HOURS PRN
Status: DISCONTINUED | OUTPATIENT
Start: 2023-10-13 | End: 2023-10-15 | Stop reason: HOSPADM

## 2023-10-13 RX ORDER — MISOPROSTOL 200 UG/1
800 TABLET ORAL PRN
Status: DISCONTINUED | OUTPATIENT
Start: 2023-10-13 | End: 2023-10-15 | Stop reason: HOSPADM

## 2023-10-13 RX ORDER — SODIUM CHLORIDE 0.9 % (FLUSH) 0.9 %
5-40 SYRINGE (ML) INJECTION EVERY 12 HOURS SCHEDULED
Status: DISCONTINUED | OUTPATIENT
Start: 2023-10-13 | End: 2023-10-15 | Stop reason: HOSPADM

## 2023-10-13 RX ORDER — MODIFIED LANOLIN
OINTMENT (GRAM) TOPICAL PRN
Status: DISCONTINUED | OUTPATIENT
Start: 2023-10-13 | End: 2023-10-15 | Stop reason: HOSPADM

## 2023-10-13 RX ORDER — SODIUM CHLORIDE, SODIUM LACTATE, POTASSIUM CHLORIDE, AND CALCIUM CHLORIDE .6; .31; .03; .02 G/100ML; G/100ML; G/100ML; G/100ML
500 INJECTION, SOLUTION INTRAVENOUS PRN
Status: DISCONTINUED | OUTPATIENT
Start: 2023-10-13 | End: 2023-10-15 | Stop reason: HOSPADM

## 2023-10-13 RX ORDER — DOCUSATE SODIUM 100 MG/1
100 CAPSULE, LIQUID FILLED ORAL 2 TIMES DAILY
Status: DISCONTINUED | OUTPATIENT
Start: 2023-10-13 | End: 2023-10-15 | Stop reason: HOSPADM

## 2023-10-13 RX ORDER — SODIUM CHLORIDE 9 MG/ML
25 INJECTION, SOLUTION INTRAVENOUS PRN
Status: DISCONTINUED | OUTPATIENT
Start: 2023-10-13 | End: 2023-10-15 | Stop reason: HOSPADM

## 2023-10-13 RX ORDER — IBUPROFEN 400 MG/1
800 TABLET ORAL EVERY 8 HOURS SCHEDULED
Status: DISCONTINUED | OUTPATIENT
Start: 2023-10-13 | End: 2023-10-15 | Stop reason: HOSPADM

## 2023-10-13 RX ORDER — MISOPROSTOL 200 UG/1
600 TABLET ORAL ONCE
Status: DISCONTINUED | OUTPATIENT
Start: 2023-10-13 | End: 2023-10-15 | Stop reason: HOSPADM

## 2023-10-13 RX ORDER — DOCUSATE SODIUM 100 MG/1
100 CAPSULE, LIQUID FILLED ORAL 2 TIMES DAILY
Status: DISCONTINUED | OUTPATIENT
Start: 2023-10-13 | End: 2023-10-13 | Stop reason: SDUPTHER

## 2023-10-13 RX ORDER — ACETAMINOPHEN 325 MG/1
650 TABLET ORAL EVERY 4 HOURS PRN
Status: DISCONTINUED | OUTPATIENT
Start: 2023-10-13 | End: 2023-10-15 | Stop reason: HOSPADM

## 2023-10-13 RX ORDER — METHYLERGONOVINE MALEATE 0.2 MG/ML
200 INJECTION INTRAVENOUS PRN
Status: DISCONTINUED | OUTPATIENT
Start: 2023-10-13 | End: 2023-10-15 | Stop reason: HOSPADM

## 2023-10-13 RX ADMIN — IBUPROFEN 800 MG: 400 TABLET, FILM COATED ORAL at 13:35

## 2023-10-13 RX ADMIN — Medication 2 MILLI-UNITS/MIN: at 10:05

## 2023-10-13 RX ADMIN — IBUPROFEN 800 MG: 400 TABLET, FILM COATED ORAL at 22:29

## 2023-10-13 RX ADMIN — SODIUM CHLORIDE 2.5 MILLION UNITS: 9 INJECTION, SOLUTION INTRAVENOUS at 10:31

## 2023-10-13 RX ADMIN — OXYTOCIN 87.3 MILLI-UNITS/MIN: 10 INJECTION INTRAVENOUS at 11:37

## 2023-10-13 RX ADMIN — ACETAMINOPHEN 1000 MG: 500 TABLET ORAL at 22:29

## 2023-10-13 RX ADMIN — DOCUSATE SODIUM 100 MG: 100 CAPSULE, LIQUID FILLED ORAL at 22:29

## 2023-10-13 RX ADMIN — Medication 166.7 ML: at 11:26

## 2023-10-13 RX ADMIN — SODIUM CHLORIDE 5 MILLION UNITS: 900 INJECTION INTRAVENOUS at 07:01

## 2023-10-13 ASSESSMENT — PAIN SCALES - GENERAL
PAINLEVEL_OUTOF10: 5
PAINLEVEL_OUTOF10: 5

## 2023-10-13 ASSESSMENT — PAIN DESCRIPTION - DESCRIPTORS
DESCRIPTORS: CRAMPING
DESCRIPTORS: CRAMPING

## 2023-10-13 ASSESSMENT — PAIN DESCRIPTION - ORIENTATION
ORIENTATION: ANTERIOR;LOWER
ORIENTATION: LOWER;MID

## 2023-10-13 ASSESSMENT — PAIN DESCRIPTION - LOCATION
LOCATION: ABDOMEN
LOCATION: ABDOMEN

## 2023-10-13 NOTE — ED PROVIDER NOTES
34 y/o [id number] @ 44 w4d c/o painful contractions. No LOF, No VB, Pos Fm, GBS Psositive           Chief Complaint   Patient presents with    Contractions       Past Medical History:   Diagnosis Date    Routine screening for STI (sexually transmitted infection) 03/16/2020    Health Dept labs: Hep B testing with immunity to hepatitis B (reactive sAb, with negative sAg and total core Ab); HIV non-reactive; T pallidum/syphilis non-reactive; HCV negative. GC/CT negative. Screening for iron deficiency anemia 03/16/2020    Health Dept labs:  Hgb 11.9 with MCV 77. Remainder of H18 normal.  Chem 8 normal except CO2 18. Screening for tuberculosis 03/16/2020    Health Dept labs:  T-spot negative. No past surgical history on file.       Family History   Problem Relation Age of Onset    No Known Problems Father     No Known Problems Mother        Social History     Socioeconomic History    Marital status:      Spouse name: Not on file    Number of children: 5    Years of education: Not on file    Highest education level: Not on file   Occupational History    Not on file   Tobacco Use    Smoking status: Never    Smokeless tobacco: Never   Substance and Sexual Activity    Alcohol use: Not Currently    Drug use: Never    Sexual activity: Not on file   Other Topics Concern    Not on file   Social History Narrative    ** Merged History Encounter **          Social Determinants of Health     Financial Resource Strain: Low Risk  (4/25/2023)    Overall Financial Resource Strain (CARDIA)     Difficulty of Paying Living Expenses: Not hard at all   Food Insecurity: No Food Insecurity (4/25/2023)    Hunger Vital Sign     Worried About Running Out of Food in the Last Year: Never true     Ran Out of Food in the Last Year: Never true   Transportation Needs: Not on file   Physical Activity: Not on file   Stress: Not on file   Social Connections: Not on file   Intimate Partner Violence: Not on file   Housing Stability: Not

## 2023-10-13 NOTE — H&P
Katherine Polo MD  Physician  Specialty:  Obstetrics  ED Provider Notes     Signed  Date of Service:  10/13/2023  6:41 AM     Signed        Expand All Collapse All                                                                                                                                                                                                            32 y/o [id number] @ 44 w4d c/o painful contractions. No LOF, No VB, Pos Fm, GBS Psositive                  Chief Complaint   Patient presents with    Contractions         Past Medical History        Past Medical History:   Diagnosis Date    Routine screening for STI (sexually transmitted infection) 03/16/2020     Health Dept labs: Hep B testing with immunity to hepatitis B (reactive sAb, with negative sAg and total core Ab); HIV non-reactive; T pallidum/syphilis non-reactive; HCV negative. GC/CT negative. Screening for iron deficiency anemia 03/16/2020     Health Dept labs:  Hgb 11.9 with MCV 77. Remainder of H18 normal.  Chem 8 normal except CO2 18. Screening for tuberculosis 03/16/2020     Health Dept labs:  T-spot negative. Past Surgical History   No past surgical history on file.          Family History         Family History   Problem Relation Age of Onset    No Known Problems Father      No Known Problems Mother              Social History               Socioeconomic History    Marital status:        Spouse name: Not on file    Number of children: 5    Years of education: Not on file    Highest education level: Not on file   Occupational History    Not on file   Tobacco Use    Smoking status: Never    Smokeless tobacco: Never   Substance and Sexual Activity    Alcohol use: Not Currently    Drug use: Never    Sexual activity: Not on file   Other Topics Concern    Not on file   Social History Narrative     ** Merged History Encounter **            Social Determinants of Health           Financial Resource Strain: Low Risk

## 2023-10-13 NOTE — L&D DELIVERY NOTE
Ramonita Phipps [[id number]]      Labor Events     Labor: No   Steroids: None  Cervical Ripening Date/Time:      Antibiotics Received during Labor: Yes  Rupture Identifier: Sac 1  Rupture Date/Time:  10/13/23 08:16:00   Rupture Type: AROM, Intact  Fluid Color: Clear  Fluid Odor: None  Fluid Volume:  Moderate  Augmentation: AROM              Anesthesia    Method: None       Labor Event Times      Labor onset date/time:  10/13/23 05:30:00     Dilation complete date/time:  10/13/23 10:50:00     Start pushing date/time:  10/13/2023 10:50:00   Decision date/time (emergent ):            Labor Length    1st stage: 5h 20m  3rd stage: 0h 06m       Delivery Details      Delivery Date: 10/13/23 Delivery Time: 09:20:00   Delivery Type: Vaginal, Spontaneous              [place] Presentation    Presentation: Vertex  Position: Middle  _: Occiput  _: Anterior       Shoulder Dystocia    Shoulder Dystocia Present?: No                                                                                                           Assisted Delivery Details    Forceps Attempted?: No  Vacuum Extractor Attempted?: No                                                             Cord    Vessels: 3 Vessels  Complications: None  Delayed Cord Clamping?: Yes  Gases Sent?: No              Placenta    Date/Time: 10/13/2023 09:26:00  Removal: Spontaneous  Appearance: Intact  Disposition: Discarded       Lacerations    Episiotomy: None       Vaginal Counts    Initial Count Personnel: Justin Ayers  Initial Count Verified By: Vester Halsted  Final Count Personnel: Daria Dutta  Final Count Verified By: DR Janell Eisenberg       Blood Loss  Mother: Sarah Stone #[id number]     Start of Mother's Information      Delivery Blood Loss  10/13/23 0530 - 10/13/23 1134      None                 End of Mother's Information  Mother: Sarah Stone #[id number]                Delivery Providers    Delivering clinician: Elle Campos MD

## 2023-10-13 NOTE — ED TRIAGE NOTES
4253 Patient arrived to [facility] for labor,  used for swili named Tim Godinez. 0692 Dr Ariadne Malloy at bedside to assess patient, SVE 5/90/-2, will admit to L&D 4 for labor. 0661 Bedside shift change report given to Zohra Mcpherson RN (oncoming nurse) by Sonya Kim RN (offgoing nurse). Report included the following information Nurse Handoff Report, Adult Overview, MAR, and Recent Results.

## 2023-10-13 NOTE — H&P
Son Floyd MD  Physician  Specialty:  Obstetrics  ED Provider Notes     Signed  Date of Service:  10/13/2023  6:41 AM     Signed        Expand All Collapse All                                                                                                                                                                                                            34 y/o [id number] @ 44 w4d c/o painful contractions. No LOF, No VB, Pos Fm, GBS Psositive                  Chief Complaint   Patient presents with    Contractions         Past Medical History[]Expand by Default        Past Medical History:   Diagnosis Date    Routine screening for STI (sexually transmitted infection) 03/16/2020     Health Dept labs: Hep B testing with immunity to hepatitis B (reactive sAb, with negative sAg and total core Ab); HIV non-reactive; T pallidum/syphilis non-reactive; HCV negative. GC/CT negative. Screening for iron deficiency anemia 03/16/2020     Health Dept labs:  Hgb 11.9 with MCV 77. Remainder of H18 normal.  Chem 8 normal except CO2 18. Screening for tuberculosis 03/16/2020     Health Dept labs:  T-spot negative. Past Surgical History   No past surgical history on file.          Family History[]Expand by Default         Family History   Problem Relation Age of Onset    No Known Problems Father      No Known Problems Mother              Social History   []Expand by Default            Socioeconomic History    Marital status:        Spouse name: Not on file    Number of children: 5    Years of education: Not on file    Highest education level: Not on file   Occupational History    Not on file   Tobacco Use    Smoking status: Never    Smokeless tobacco: Never   Substance and Sexual Activity    Alcohol use: Not Currently    Drug use: Never    Sexual activity: Not on file   Other Topics Concern    Not on file   Social History Narrative     ** Merged History Encounter **            Social Determinants of

## 2023-10-14 PROCEDURE — 1120000000 HC RM PRIVATE OB

## 2023-10-14 PROCEDURE — 6370000000 HC RX 637 (ALT 250 FOR IP): Performed by: OBSTETRICS & GYNECOLOGY

## 2023-10-14 RX ADMIN — DOCUSATE SODIUM 100 MG: 100 CAPSULE, LIQUID FILLED ORAL at 21:15

## 2023-10-14 RX ADMIN — DOCUSATE SODIUM 100 MG: 100 CAPSULE, LIQUID FILLED ORAL at 08:12

## 2023-10-14 RX ADMIN — IBUPROFEN 800 MG: 400 TABLET, FILM COATED ORAL at 08:12

## 2023-10-14 RX ADMIN — ACETAMINOPHEN 1000 MG: 500 TABLET ORAL at 17:04

## 2023-10-14 RX ADMIN — ACETAMINOPHEN 1000 MG: 500 TABLET ORAL at 08:13

## 2023-10-14 RX ADMIN — IBUPROFEN 800 MG: 400 TABLET, FILM COATED ORAL at 17:04

## 2023-10-14 ASSESSMENT — PAIN DESCRIPTION - ORIENTATION
ORIENTATION: LOWER;MID
ORIENTATION: LOWER;MID

## 2023-10-14 ASSESSMENT — PAIN DESCRIPTION - DESCRIPTORS
DESCRIPTORS: CRAMPING;SORE
DESCRIPTORS: SORE

## 2023-10-14 ASSESSMENT — PAIN DESCRIPTION - LOCATION
LOCATION: ABDOMEN
LOCATION: ABDOMEN

## 2023-10-14 ASSESSMENT — PAIN SCALES - GENERAL
PAINLEVEL_OUTOF10: 5
PAINLEVEL_OUTOF10: 5

## 2023-10-15 VITALS
DIASTOLIC BLOOD PRESSURE: 63 MMHG | TEMPERATURE: 98.3 F | HEART RATE: 83 BPM | RESPIRATION RATE: 16 BRPM | SYSTOLIC BLOOD PRESSURE: 105 MMHG | OXYGEN SATURATION: 97 %

## 2023-10-15 PROCEDURE — 6370000000 HC RX 637 (ALT 250 FOR IP): Performed by: OBSTETRICS & GYNECOLOGY

## 2023-10-15 RX ORDER — IBUPROFEN 800 MG/1
800 TABLET ORAL EVERY 8 HOURS SCHEDULED
Qty: 30 TABLET | Refills: 1 | Status: SHIPPED | OUTPATIENT
Start: 2023-10-15

## 2023-10-15 RX ADMIN — ACETAMINOPHEN 1000 MG: 500 TABLET ORAL at 01:01

## 2023-10-15 RX ADMIN — DOCUSATE SODIUM 100 MG: 100 CAPSULE, LIQUID FILLED ORAL at 10:03

## 2023-10-15 RX ADMIN — IBUPROFEN 800 MG: 400 TABLET, FILM COATED ORAL at 10:02

## 2023-10-15 RX ADMIN — IBUPROFEN 800 MG: 400 TABLET, FILM COATED ORAL at 01:02

## 2023-10-15 RX ADMIN — ACETAMINOPHEN 1000 MG: 500 TABLET ORAL at 10:02

## 2023-10-15 ASSESSMENT — PAIN DESCRIPTION - LOCATION: LOCATION: ABDOMEN

## 2023-10-15 ASSESSMENT — PAIN SCALES - GENERAL: PAINLEVEL_OUTOF10: 5

## 2023-10-15 ASSESSMENT — PAIN - FUNCTIONAL ASSESSMENT: PAIN_FUNCTIONAL_ASSESSMENT: ACTIVITIES ARE NOT PREVENTED

## 2023-10-15 ASSESSMENT — PAIN DESCRIPTION - ORIENTATION: ORIENTATION: LOWER

## 2023-10-15 ASSESSMENT — PAIN DESCRIPTION - DESCRIPTORS: DESCRIPTORS: CRAMPING

## 2023-10-15 NOTE — CONSULTS
Session ID: [id number]  Language: Swahili   ID: #[id number]   Name: Sraah Reid    Pt discharged home with family. Discharge instructions and medication times reviewed. Pt verbalized understanding. Signature obtained on paper and placed on chart.

## 2023-10-15 NOTE — DISCHARGE INSTRUCTIONS
General activities  For vaginal deliveries, be up and moving around but remember to rest! Your body grew and birthed a small human! Be kind to yourself and allow your body to heal. You may gradually resume your normal activities as soon as you feel up to it. You may begin walking and strolling with the baby when you feel ready. Stay close to home the first few times in case you tire quickly. Do not push yourself. This is not about getting fit fast. This is allowing your body to have some movement which will aid in healing. Fresh air and sunshine are refreshing for both you and your baby. Avoid heavy lifting, strenuous exercise, and excessive stair climbing. If you delivered by  section, take your time. Give yourself some mello! You should be up and moving multiple times per day, this will help you to feel better faster. However, be mindful and do not push yourself. If you have a day that you feel very uncomfortable, you likely did too much the day before. Rest and recognize your body is not ready for that level of activity yet. You will get there soon. A good rule to follow is that you should not lift anything heavier than your baby in the care seat for the first 2 weeks. Moms with toddlers at home have children climb up to you on the couch to snuggle. If you do not have help at home post  and have multiple children to care for, please reach out to us. Driving is individual. 7-14 days is a good rule of thumb. The first time you drive please have someone with you that can take over if you suddenly feel you cannot continue. Do not drive if you are taking narcotics for pain relief. Shower as often as you like. You can even take a bath. An Epsom salt bath may help you feel better after delivery. For  deliveries, keep the water level below your incision. There should be nothing placed in the vagina until after your postpartum checkup. This means no tampons, douching, or intercourse (sex).

## 2023-10-15 NOTE — DISCHARGE SUMMARY
Obstetrical Discharge Summary     Name: Bartolo Sheth MRN: [id number]  SSN: [id number]    YOB: 1995  Age: 33 y.o. Sex: female      Admit Date: 10/13/2023    Discharge Date: 10/15/2023     Admitting Physician: Benjamin Mcknight MD     Attending Physician:  Farida Kaur MD     Admission Diagnoses: Abdominal pain affecting pregnancy [O26.899, R10.9]    Procedure Performed:  * No surgery found *  Surgical     Used for misc procedures    Discharge Diagnoses:   Information for the patient's :  Robb Main [[id number]]   @[id number]@      Additional Diagnoses:  No components found for: \"OBEXTABORH\", \"OBEXTABSCRN\", \"OBEXTRUBELLA\", \"OBEXTGRBS\"    Hospital Course: Normal hospital course following the delivery. Patient Disposition: Home      Followup Care:  Discharge Condition: Stable  activity as tolerated and no sex for 6 weeks  regular diet      Patient Instructions:   Current Discharge Medication List        START taking these medications    Details   ibuprofen (ADVIL;MOTRIN) 800 MG tablet Take 1 tablet by mouth every 8 hours  Qty: 30 tablet, Refills: 1           CONTINUE these medications which have NOT CHANGED    Details   Prenatal Vit-Fe Fumarate-FA (PRENATAL PO) Take 1 tablet by mouth daily             Reference my discharge instructions. No follow-ups on file.      Signed By:  ANDI Virk CNM     October 15, 2023

## 2023-10-16 LAB
ABO + RH BLD: NORMAL
ANTIGENS PRESENT RBC DONR: NORMAL
ANTIGENS PRESENT RBC DONR: NORMAL
BLD PROD TYP BPU: NORMAL
BLD PROD TYP BPU: NORMAL
BLOOD BANK CMNT PATIENT-IMP: NORMAL
BLOOD BANK DISPENSE STATUS: NORMAL
BLOOD BANK DISPENSE STATUS: NORMAL
BLOOD GROUP ANTIBODIES SERPL: NORMAL
BLOOD GROUP ANTIBODIES SERPL: NORMAL
BPU ID: NORMAL
BPU ID: NORMAL
CROSSMATCH RESULT: NORMAL
CROSSMATCH RESULT: NORMAL
DAT POLY-SP REAG RBC QL: NORMAL
SPECIMEN EXP DATE BLD: NORMAL
UNIT DIVISION: 0
UNIT DIVISION: 0

## 2023-12-01 ENCOUNTER — POSTPARTUM VISIT (OUTPATIENT)
Age: 28
End: 2023-12-01

## 2023-12-01 VITALS — BODY MASS INDEX: 33.48 KG/M2 | WEIGHT: 189 LBS

## 2023-12-01 DIAGNOSIS — Z01.419 WELL WOMAN EXAM: Primary | ICD-10-CM

## 2023-12-01 PROBLEM — Z34.90 PREGNANCY: Status: RESOLVED | Noted: 2023-05-02 | Resolved: 2023-12-01

## 2023-12-01 PROCEDURE — 0503F POSTPARTUM CARE VISIT: CPT | Performed by: OBSTETRICS & GYNECOLOGY

## 2023-12-01 RX ORDER — ACETAMINOPHEN AND CODEINE PHOSPHATE 120; 12 MG/5ML; MG/5ML
1 SOLUTION ORAL DAILY
Qty: 28 TABLET | Refills: 12 | Status: SHIPPED | OUTPATIENT
Start: 2023-12-01

## 2023-12-01 NOTE — CONSULTS
Session ID: [id number]  Language: Swahili   ID: #[id number]   Name: Yen Ruiza: Gracia Macedo   ID: #[id number]   Name: Ana Paula Lovett

## 2023-12-04 LAB
., LABCORP: NORMAL
CYTOLOGIST CVX/VAG CYTO: NORMAL
CYTOLOGY CVX/VAG DOC CYTO: NORMAL
CYTOLOGY CVX/VAG DOC THIN PREP: NORMAL
DX ICD CODE: NORMAL
Lab: NORMAL
OTHER STN SPEC: NORMAL
STAT OF ADQ CVX/VAG CYTO-IMP: NORMAL

## 2024-04-12 ENCOUNTER — HOSPITAL ENCOUNTER (EMERGENCY)
Facility: HOSPITAL | Age: 29
Discharge: HOME | End: 2024-04-12
Attending: STUDENT IN AN ORGANIZED HEALTH CARE EDUCATION/TRAINING PROGRAM
Payer: MEDICAID

## 2024-04-12 ENCOUNTER — APPOINTMENT (OUTPATIENT)
Facility: HOSPITAL | Age: 29
End: 2024-04-12
Payer: MEDICAID

## 2024-04-12 VITALS
DIASTOLIC BLOOD PRESSURE: 77 MMHG | HEIGHT: 65 IN | BODY MASS INDEX: 33.5 KG/M2 | WEIGHT: 201.06 LBS | HEART RATE: 76 BPM | TEMPERATURE: 98.4 F | SYSTOLIC BLOOD PRESSURE: 123 MMHG | RESPIRATION RATE: 16 BRPM | OXYGEN SATURATION: 99 %

## 2024-04-12 DIAGNOSIS — S61.012A LACERATION OF LEFT THUMB WITHOUT FOREIGN BODY WITHOUT DAMAGE TO NAIL, INITIAL ENCOUNTER: Primary | ICD-10-CM

## 2024-04-12 PROCEDURE — 2500000003 HC RX 250 WO HCPCS: Performed by: NURSE PRACTITIONER

## 2024-04-12 PROCEDURE — 73140 X-RAY EXAM OF FINGER(S): CPT

## 2024-04-12 PROCEDURE — 90714 TD VACC NO PRESV 7 YRS+ IM: CPT | Performed by: NURSE PRACTITIONER

## 2024-04-12 PROCEDURE — 90471 IMMUNIZATION ADMIN: CPT | Performed by: NURSE PRACTITIONER

## 2024-04-12 PROCEDURE — 6360000002 HC RX W HCPCS: Performed by: NURSE PRACTITIONER

## 2024-04-12 PROCEDURE — 12001 RPR S/N/AX/GEN/TRNK 2.5CM/<: CPT

## 2024-04-12 PROCEDURE — 6370000000 HC RX 637 (ALT 250 FOR IP): Performed by: NURSE PRACTITIONER

## 2024-04-12 PROCEDURE — 99284 EMERGENCY DEPT VISIT MOD MDM: CPT

## 2024-04-12 RX ORDER — LIDOCAINE HYDROCHLORIDE AND EPINEPHRINE 10; 10 MG/ML; UG/ML
10 INJECTION, SOLUTION INFILTRATION; PERINEURAL ONCE
Status: COMPLETED | OUTPATIENT
Start: 2024-04-12 | End: 2024-04-12

## 2024-04-12 RX ORDER — NAPROXEN 250 MG/1
500 TABLET ORAL
Status: COMPLETED | OUTPATIENT
Start: 2024-04-12 | End: 2024-04-12

## 2024-04-12 RX ORDER — CEPHALEXIN 500 MG/1
500 CAPSULE ORAL 3 TIMES DAILY
Qty: 21 CAPSULE | Refills: 0 | Status: SHIPPED | OUTPATIENT
Start: 2024-04-12 | End: 2024-04-19

## 2024-04-12 RX ORDER — GINSENG 100 MG
CAPSULE ORAL
Status: COMPLETED | OUTPATIENT
Start: 2024-04-12 | End: 2024-04-12

## 2024-04-12 RX ORDER — TETANUS AND DIPHTHERIA TOXOIDS ADSORBED 2; 2 [LF]/.5ML; [LF]/.5ML
0.5 INJECTION INTRAMUSCULAR ONCE
Status: COMPLETED | OUTPATIENT
Start: 2024-04-12 | End: 2024-04-12

## 2024-04-12 RX ADMIN — LIDOCAINE HYDROCHLORIDE,EPINEPHRINE BITARTRATE 10 ML: 10; .01 INJECTION, SOLUTION INFILTRATION; PERINEURAL at 18:19

## 2024-04-12 RX ADMIN — BACITRACIN 1 EACH: 500 OINTMENT TOPICAL at 18:19

## 2024-04-12 RX ADMIN — NAPROXEN 500 MG: 250 TABLET ORAL at 18:19

## 2024-04-12 RX ADMIN — TETANUS AND DIPHTHERIA TOXOIDS ADSORBED 0.5 ML: 2; 2 INJECTION INTRAMUSCULAR at 19:28

## 2024-04-12 ASSESSMENT — PAIN DESCRIPTION - FREQUENCY: FREQUENCY: CONTINUOUS

## 2024-04-12 ASSESSMENT — PAIN - FUNCTIONAL ASSESSMENT
PAIN_FUNCTIONAL_ASSESSMENT: ACTIVITIES ARE NOT PREVENTED
PAIN_FUNCTIONAL_ASSESSMENT: 0-10

## 2024-04-12 ASSESSMENT — PAIN DESCRIPTION - ORIENTATION
ORIENTATION: LEFT
ORIENTATION: LEFT

## 2024-04-12 ASSESSMENT — PAIN DESCRIPTION - LOCATION
LOCATION: FINGER (COMMENT WHICH ONE)
LOCATION: FINGER (COMMENT WHICH ONE)

## 2024-04-12 ASSESSMENT — PAIN DESCRIPTION - DESCRIPTORS: DESCRIPTORS: ACHING

## 2024-04-12 ASSESSMENT — PAIN SCALES - GENERAL
PAINLEVEL_OUTOF10: 6
PAINLEVEL_OUTOF10: 6

## 2024-04-12 ASSESSMENT — PAIN DESCRIPTION - ONSET: ONSET: SUDDEN

## 2024-04-12 ASSESSMENT — PAIN DESCRIPTION - PAIN TYPE: TYPE: ACUTE PAIN

## 2024-04-12 NOTE — ED PROVIDER NOTES
[facility] EMERGENCY DEP  EMERGENCY DEPARTMENT ENCOUNTER      Pt Name: Shimon Phipps  MRN: [id number]  Birthdate 1995  Date of evaluation: 4/12/2024  Provider: Santos Briones PA-C    CHIEF COMPLAINT       Chief Complaint   Patient presents with    Laceration         HISTORY OF PRESENT ILLNESS   (Location/Symptom, Timing/Onset, Context/Setting, Quality, Duration, Modifying Factors, Severity)  Note limiting factors.   28-year-old female with no significant past medical history presents with complaint of thumb laceration.  Patient states that she was cutting something at work when the kitchen knife slipped and cut her thumb.  Unknown when last tetanus vaccine was.  No other complaints at this time.            Review of External Medical Records:     Nursing Notes were reviewed.    REVIEW OF SYSTEMS    (2-9 systems for level 4, 10 or more for level 5)     Review of Systems    Except as noted above the remainder of the review of systems was reviewed and negative.       PAST MEDICAL HISTORY     Past Medical History:   Diagnosis Date    Routine screening for STI (sexually transmitted infection) 03/16/2020    Health Dept labs:  Hep B testing with immunity to hepatitis B (reactive sAb, with negative sAg and total core Ab); HIV non-reactive; T pallidum/syphilis non-reactive; HCV negative.  GC/CT negative.    Screening for iron deficiency anemia 03/16/2020    Health Dept labs:  Hgb 11.9 with MCV 77.  Remainder of H18 normal.  Chem 8 normal except CO2 18.      Screening for tuberculosis 03/16/2020    Health Dept labs:  T-spot negative.         SURGICAL HISTORY     No past surgical history on file.      CURRENT MEDICATIONS       Previous Medications    IBUPROFEN (ADVIL;MOTRIN) 800 MG TABLET    Take 1 tablet by mouth every 8 hours    NORETHINDRONE (ORTHO MICRONOR) 0.35 MG TABLET    Take 1 tablet by mouth daily    PRENATAL VIT-FE FUMARATE-FA (PRENATAL PO)    Take 1 tablet by mouth daily       ALLERGIES     Patient has  under pressure.  Laceration repaired in simple fashion.  Topical bacitracin and a nonadherent bandage applied.  Patient tolerated procedure well and neurovascular exam intact and unchanged post repair with intact distal pulses and cap refill.  X-ray of the finger was obtained which was negative for any acute fractures or foreign body.  Soft tissue irregularity consistent with laceration.  Patient is safe for discharge home at this time.  Will provide with a prescription for antibiotics.  Wound care and return precautions discussed with patient who expresses understanding and is in agreement with the current plan.  Recommend follow-up with primary care provider, local urgent care, or return to this department for suture removal in 10 days.   utilized throughout this visit.    Amount and/or Complexity of Data Reviewed  Radiology: ordered.    Risk  OTC drugs.  Prescription drug management.          PROCEDURES:  Unless otherwise noted below, none     Lac Repair    Date/Time: 4/12/2024 6:16 PM    Performed by: Santos Briones PA-C  Authorized by: Tony Blackmon MD    Consent:     Consent obtained:  Verbal    Consent given by:  Patient    Risks discussed:  Infection, need for additional repair and poor cosmetic result    Alternatives discussed:  No treatment and referral  Laceration details:     Location:  Finger    Finger location:  L thumb    Length (cm):  1.5  Treatment:     Area cleansed with:  Povidone-iodine    Amount of cleaning:  Standard    Irrigation solution:  Sterile saline    Irrigation volume:  250mL    Irrigation method:  Pressure wash  Skin repair:     Repair method:  Sutures    Suture size:  5-0    Suture material:  Prolene    Suture technique:  Simple interrupted    Number of sutures:  3  Approximation:     Approximation:  Close  Repair type:     Repair type:  Simple  Post-procedure details:     Dressing:  Antibiotic ointment and non-adherent dressing    Procedure completion:

## 2024-04-12 NOTE — ED TRIAGE NOTES
Patient arrives to ER with c/o cutting her left thumb while at work with a knife. Unknown of last tetanus.

## 2024-04-12 NOTE — DISCHARGE INSTRUCTIONS
You were seen and evaluated here today for a laceration.  You had 3 sutures placed.  For the first 24 hours, keep the area dry.  After that, it is okay for soap and water to run over the wound, just avoid scrubbing the area directly.  Return to your primary care provider, local urgent care, or this emergency department for suture removal in 10 days.

## 2024-04-12 NOTE — ED PROVIDER NOTES
[facility] EMERGENCY DEP  EMERGENCY DEPARTMENT ENCOUNTER      Pt Name: Shimon Phipps  MRN: [id number]  Birthdate 1995  Date of evaluation: 4/12/2024  Provider: ANDI Rangel NP    CHIEF COMPLAINT     No chief complaint on file.        HISTORY OF PRESENT ILLNESS   (Location/Symptom, Timing/Onset, Context/Setting, Quality, Duration, Modifying Factors, Severity)  Note limiting factors.   Cut left thumb with kitchen knife earlier today.  Last tetanus unknown.       Session ID: [id number]  Language: Swahili   ID: #[id number]   Name: Lela          The history is provided by the patient. The history is limited by a language barrier. A  was used ([address] [employer]).         Review of External Medical Records:     Nursing Notes were reviewed.    REVIEW OF SYSTEMS    (2-9 systems for level 4, 10 or more for level 5)     Review of Systems    Except as noted above the remainder of the review of systems was reviewed and negative.       PAST MEDICAL HISTORY     Past Medical History:   Diagnosis Date    Routine screening for STI (sexually transmitted infection) 03/16/2020    Health Dept labs:  Hep B testing with immunity to hepatitis B (reactive sAb, with negative sAg and total core Ab); HIV non-reactive; T pallidum/syphilis non-reactive; HCV negative.  GC/CT negative.    Screening for iron deficiency anemia 03/16/2020    Health Dept labs:  Hgb 11.9 with MCV 77.  Remainder of H18 normal.  Chem 8 normal except CO2 18.      Screening for tuberculosis 03/16/2020    Health Dept labs:  T-spot negative.         SURGICAL HISTORY     No past surgical history on file.      CURRENT MEDICATIONS       Previous Medications    IBUPROFEN (ADVIL;MOTRIN) 800 MG TABLET    Take 1 tablet by mouth every 8 hours    NORETHINDRONE (ORTHO MICRONOR) 0.35 MG TABLET    Take 1 tablet by mouth daily    PRENATAL VIT-FE FUMARATE-FA (PRENATAL PO)    Take 1 tablet by mouth daily       ALLERGIES     Patient has no

## 2024-09-16 DIAGNOSIS — O36.80X0 ENCOUNTER TO DETERMINE FETAL VIABILITY OF PREGNANCY, SINGLE OR UNSPECIFIED FETUS: Primary | ICD-10-CM

## 2024-09-17 ENCOUNTER — INITIAL PRENATAL (OUTPATIENT)
Age: 29
End: 2024-09-17

## 2024-09-17 VITALS
HEART RATE: 75 BPM | WEIGHT: 204 LBS | OXYGEN SATURATION: 98 % | BODY MASS INDEX: 33.95 KG/M2 | SYSTOLIC BLOOD PRESSURE: 102 MMHG | DIASTOLIC BLOOD PRESSURE: 64 MMHG

## 2024-09-17 DIAGNOSIS — Z34.82 PRENATAL CARE, SUBSEQUENT PREGNANCY, SECOND TRIMESTER: Primary | ICD-10-CM

## 2024-09-17 DIAGNOSIS — Z34.90 PREGNANCY, UNSPECIFIED GESTATIONAL AGE: ICD-10-CM

## 2024-09-17 LAB
ABO + RH BLD: NORMAL
BLOOD BANK CMNT PATIENT-IMP: NORMAL
BLOOD GROUP ANTIBODIES SERPL: NORMAL
BLOOD GROUP ANTIBODIES SERPL: NORMAL
C. TRACHOMATIS, EXTERNAL RESULT: NORMAL
ERYTHROCYTE [DISTWIDTH] IN BLOOD BY AUTOMATED COUNT: 13.8 % (ref 11.5–14.5)
HBV SURFACE AG SER QL: 0.37 INDEX
HBV SURFACE AG SER QL: NEGATIVE
HCT VFR BLD AUTO: 32.9 % (ref 35–47)
HCV AB SER IA-ACNC: 0.23 INDEX
HCV AB SERPL QL IA: NONREACTIVE
HEP B, EXTERNAL RESULT: NORMAL
HGB BLD-MCNC: 10.1 G/DL (ref 11.5–16)
HIV 1+2 AB+HIV1 P24 AG SERPL QL IA: NONREACTIVE
HIV 1/2 RESULT COMMENT: NORMAL
HIV, EXTERNAL RESULT: NORMAL
MCH RBC QN AUTO: 23.7 PG (ref 26–34)
MCHC RBC AUTO-ENTMCNC: 30.7 G/DL (ref 30–36.5)
MCV RBC AUTO: 77.2 FL (ref 80–99)
N. GONORRHOEAE, EXTERNAL RESULT: NORMAL
NRBC # BLD: 0 K/UL (ref 0–0.01)
NRBC BLD-RTO: 0 PER 100 WBC
PLATELET # BLD AUTO: 156 K/UL (ref 150–400)
RBC # BLD AUTO: 4.26 M/UL (ref 3.8–5.2)
RUBELLA TITER, EXTERNAL RESULT: NORMAL
RUBV IGG SERPL IA-ACNC: NORMAL IU/ML
SPECIMEN EXP DATE BLD: NORMAL
T. PALLIDUM (SYPHILIS) ANTIBODY, EXTERNAL RESULT: NORMAL
WBC # BLD AUTO: 5.5 K/UL (ref 3.6–11)

## 2024-09-17 PROCEDURE — 0500F INITIAL PRENATAL CARE VISIT: CPT | Performed by: OBSTETRICS & GYNECOLOGY

## 2024-09-18 LAB
BACTERIA SPEC CULT: NORMAL
BLD GP AB SCN TITR SERPL: NORMAL {TITER}
SERVICE CMNT-IMP: NORMAL
T PALLIDUM AB SER QL IA: NON REACTIVE
VZV IGG SER IA-ACNC: 1662 INDEX

## 2024-09-19 LAB
HGB A MFR BLD: 97 % (ref 96.4–98.8)
HGB A2 MFR BLD COLUMN CHROM: 2.5 % (ref 1.8–3.2)
HGB F MFR BLD: 0.5 % (ref 0–2)
HGB FRACT BLD-IMP: NORMAL
HGB S MFR BLD: 0 %

## 2024-09-20 LAB
C TRACH RRNA SPEC QL NAA+PROBE: NEGATIVE
N GONORRHOEA RRNA SPEC QL NAA+PROBE: NEGATIVE
SPECIMEN SOURCE: NORMAL
T VAGINALIS RRNA SPEC QL NAA+PROBE: NEGATIVE

## 2024-10-08 ENCOUNTER — ROUTINE PRENATAL (OUTPATIENT)
Age: 29
End: 2024-10-08
Payer: MEDICAID

## 2024-10-08 ENCOUNTER — LAB (OUTPATIENT)
Age: 29
End: 2024-10-08

## 2024-10-08 VITALS
TEMPERATURE: 97.7 F | HEIGHT: 65 IN | HEART RATE: 66 BPM | WEIGHT: 206 LBS | DIASTOLIC BLOOD PRESSURE: 72 MMHG | BODY MASS INDEX: 34.32 KG/M2 | SYSTOLIC BLOOD PRESSURE: 111 MMHG | OXYGEN SATURATION: 98 %

## 2024-10-08 DIAGNOSIS — Z34.82 PRENATAL CARE, SUBSEQUENT PREGNANCY, SECOND TRIMESTER: Primary | ICD-10-CM

## 2024-10-08 DIAGNOSIS — Z34.90 PREGNANCY, UNSPECIFIED GESTATIONAL AGE: ICD-10-CM

## 2024-10-08 DIAGNOSIS — Z23 IMMUNIZATION DUE: ICD-10-CM

## 2024-10-08 DIAGNOSIS — Z34.90 PREGNANCY, UNSPECIFIED GESTATIONAL AGE: Primary | ICD-10-CM

## 2024-10-08 PROCEDURE — 90471 IMMUNIZATION ADMIN: CPT | Performed by: OBSTETRICS & GYNECOLOGY

## 2024-10-08 PROCEDURE — 90715 TDAP VACCINE 7 YRS/> IM: CPT | Performed by: OBSTETRICS & GYNECOLOGY

## 2024-10-08 PROCEDURE — 0502F SUBSEQUENT PRENATAL CARE: CPT | Performed by: OBSTETRICS & GYNECOLOGY

## 2024-10-08 NOTE — PROGRESS NOTES
Tired but otherwise doing well  Reports no contact with M; calling office; their records show appt no show October 1; appt tomorrow set up and info shared using  and stressing importance  Glucola now w repeat titers- patient left office prior to lab draw; M notified

## 2024-10-09 ENCOUNTER — ROUTINE PRENATAL (OUTPATIENT)
Age: 29
End: 2024-10-09
Payer: MEDICAID

## 2024-10-09 VITALS — SYSTOLIC BLOOD PRESSURE: 104 MMHG | DIASTOLIC BLOOD PRESSURE: 68 MMHG | HEART RATE: 71 BPM

## 2024-10-09 DIAGNOSIS — Z3A.29 29 WEEKS GESTATION OF PREGNANCY: Primary | ICD-10-CM

## 2024-10-09 PROCEDURE — 99205 OFFICE O/P NEW HI 60 MIN: CPT | Performed by: STUDENT IN AN ORGANIZED HEALTH CARE EDUCATION/TRAINING PROGRAM

## 2024-10-09 PROCEDURE — 76821 MIDDLE CEREBRAL ARTERY ECHO: CPT | Performed by: STUDENT IN AN ORGANIZED HEALTH CARE EDUCATION/TRAINING PROGRAM

## 2024-10-09 PROCEDURE — 76811 OB US DETAILED SNGL FETUS: CPT | Performed by: STUDENT IN AN ORGANIZED HEALTH CARE EDUCATION/TRAINING PROGRAM

## 2024-10-09 RX ORDER — ASPIRIN 81 MG/1
81 TABLET ORAL DAILY
Qty: 30 TABLET | Refills: 0 | Status: SHIPPED | OUTPATIENT
Start: 2024-10-09

## 2024-10-09 NOTE — PROGRESS NOTES
Patient was seen 10/9/2024      Please look under media to view full consult and ultrasound report in ViewPoint.         Josefina Stokes MD   Maternal Fetal Medicine

## 2024-10-09 NOTE — PROCEDURES
and maternal anatomy visualized as stated above.  Amniotic fluid: normal.  Placenta is anterior, appropriate distance from the internal os.  Cephalic presentation.    Detailed anatomy scan performed today and appears normal. Examination was technically difficult due to late gestational age. Not all anatomic structures can be optimally  visualized today and may not be able to be seen during this pregnancy. Ultrasound cannot detect all fetal abnormalities.    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 For  PARVIN antibodies, we do not recommend titers as they are not indicative of disease progression. Patient had prior negative screens but had a PPH and transfusion in  previous pregnancy.  has not had testing done.  - Recommend the phenotype and zygosity of the father of the baby to be obtained. If he does not have the parvin antigen, the fetus will not be at risk for problems. If he is  positive, there may be a risk for fetal complications.  - Paternal testing ordered today.  - Will continue weekly MCA dopplers until test results. If he is positive for the antigen or paternal testing is not an option, recommend weekly MCA dopplers until delivery  and weekly BPP starting at 28 weeks.  - MCA normal today.    Hx of IUFD at 37 weeks / severe preE  - Pregnancy was complicated by FGR, severe preE and abruption. She had a PPH req blood transfusion.  - Rec 81mg ldASA (ordered, patient unsure if she is taking)  - baseline preE labs (CMP, Pr/Cr or 24 hour urine protein)  - APLAS testing recommended (ordered in our office)  - Given history would recommend delivery at 37.0 unless other clinical indications arise.    PMH: Dnies  PSH: Denies  Social Hx: Denies alcohol, tobacco or recreational drug use. Feels safe at

## 2024-10-22 ENCOUNTER — ROUTINE PRENATAL (OUTPATIENT)
Age: 29
End: 2024-10-22
Payer: MEDICAID

## 2024-10-22 VITALS — SYSTOLIC BLOOD PRESSURE: 103 MMHG | HEART RATE: 63 BPM | DIASTOLIC BLOOD PRESSURE: 68 MMHG

## 2024-10-22 DIAGNOSIS — Z87.59 HISTORY OF IUFD: Primary | ICD-10-CM

## 2024-10-22 DIAGNOSIS — Z3A.31 31 WEEKS GESTATION OF PREGNANCY: Primary | ICD-10-CM

## 2024-10-22 PROCEDURE — 76818 FETAL BIOPHYS PROFILE W/NST: CPT | Performed by: STUDENT IN AN ORGANIZED HEALTH CARE EDUCATION/TRAINING PROGRAM

## 2024-10-22 PROCEDURE — 76820 UMBILICAL ARTERY ECHO: CPT | Performed by: STUDENT IN AN ORGANIZED HEALTH CARE EDUCATION/TRAINING PROGRAM

## 2024-10-22 PROCEDURE — 99214 OFFICE O/P EST MOD 30 MIN: CPT | Performed by: STUDENT IN AN ORGANIZED HEALTH CARE EDUCATION/TRAINING PROGRAM

## 2024-10-22 PROCEDURE — 76821 MIDDLE CEREBRAL ARTERY ECHO: CPT | Performed by: STUDENT IN AN ORGANIZED HEALTH CARE EDUCATION/TRAINING PROGRAM

## 2024-10-22 RX ORDER — ASPIRIN 81 MG/1
81 TABLET ORAL DAILY
Qty: 30 TABLET | Refills: 4 | Status: CANCELLED | OUTPATIENT
Start: 2024-10-22

## 2024-10-22 NOTE — PROCEDURES
PATIENT: CELIA HEATH   -  : 1995   -  DOS:10/22/2024   -  INTERPRETING PROVIDER:Josefina Stokes,   Indication  ========    isoimmunization. Anatomy, Isoimmunization. isoimmunization    Method  ======    Transabdominal ultrasound examination. External Fetal Monitor. View: Sufficient    Pregnancy  =========    Alcantar pregnancy. Number of fetuses: 1    Dating  ======    Previous Ultrasound on: 2024  Type of prior assessment: GA  GA at prior assessment date 26 w + 0 d  GA by previous U/S 31 w + 0 d  ELIA by previous Ultrasound: 2024  Assigned: based on ultrasound (GA), selected on 10/9/2024  Assigned GA 31 w + 0 d  Assigned ELIA: 2024    General Evaluation  ==============    Cardiac activity present.  bpm. Fetal movements: visualized. Presentation: Cephalic  Placenta: Placental site: Anterior  Umbilical cord: Cord vessels: 3 vessel cord,    Fetal Biometry  ============    Standard  EFW by: Hadlock (BPD-HC-AC-FL)  Head / Face / Neck  Nasal bone: present  Other Structures   bpm    Amniotic Fluid Assessment  =====================    Amount of AF: normal  MVP 6.2 cm. MARY 18.0 cm. Q1 6.2 cm, Q2 4.3 cm, Q3 3.9 cm, Q4 3.6 cm    Biophysical Profile  ==============    0: Fetal breathing movements  2: Gross body movements  2: Fetal tone  2: Amniotic fluid volume  NST: reactive  8/10 Biophysical profile score  Interpretation: normal    Non Stress Test  =============    NST interpretation: reactive. Test duration 20 min. Baseline  bpm. Baseline variability: moderate. Accelerations: present. Decelerations: absent. Uterine activity:  absent. Acoustic stimulation: no    Fetal Doppler  ===========    Arterial  Umbilical artery: normal  Umbilical A sampling site: midcord  Umbilical A PI 0.98 60% Ebbing  Umbilical A RI 0.60 34% Morgan  Umbilical A PS 25.60 cm/s <1% Ebbing  Umbilical A ED 10.16 cm/s  Umbilical A EDF: positive  Umbilical A TAmax 15.76 cm/s <1% Ebbing  Umbilical A

## 2024-10-22 NOTE — PROGRESS NOTES
Patient was seen 10/22/2024      Please look under media to view full consult and ultrasound report in ViewPoint.         Josefina Stokes MD   Maternal Fetal Medicine

## 2024-10-22 NOTE — PROGRESS NOTES
Verbal order obtained from Bernie Gonzalez MD for growth ultrasound and a diagnosis of hx of iufd. Order read back to MD. Orders signed by this writer and sent for co-sign to MD.

## 2024-11-05 ENCOUNTER — ROUTINE PRENATAL (OUTPATIENT)
Age: 29
End: 2024-11-05
Payer: MEDICAID

## 2024-11-05 VITALS — HEART RATE: 74 BPM | SYSTOLIC BLOOD PRESSURE: 98 MMHG | DIASTOLIC BLOOD PRESSURE: 67 MMHG

## 2024-11-05 DIAGNOSIS — Z3A.33 33 WEEKS GESTATION OF PREGNANCY: Primary | ICD-10-CM

## 2024-11-05 PROCEDURE — 99214 OFFICE O/P EST MOD 30 MIN: CPT | Performed by: STUDENT IN AN ORGANIZED HEALTH CARE EDUCATION/TRAINING PROGRAM

## 2024-11-05 PROCEDURE — 76816 OB US FOLLOW-UP PER FETUS: CPT | Performed by: STUDENT IN AN ORGANIZED HEALTH CARE EDUCATION/TRAINING PROGRAM

## 2024-11-05 PROCEDURE — 76820 UMBILICAL ARTERY ECHO: CPT | Performed by: STUDENT IN AN ORGANIZED HEALTH CARE EDUCATION/TRAINING PROGRAM

## 2024-11-05 PROCEDURE — 76819 FETAL BIOPHYS PROFIL W/O NST: CPT | Performed by: STUDENT IN AN ORGANIZED HEALTH CARE EDUCATION/TRAINING PROGRAM

## 2024-11-05 PROCEDURE — 76821 MIDDLE CEREBRAL ARTERY ECHO: CPT | Performed by: STUDENT IN AN ORGANIZED HEALTH CARE EDUCATION/TRAINING PROGRAM

## 2024-11-05 NOTE — PROCEDURES
PATIENT: CELIA HEATH   -  : 1995   -  DOS:2024   -  INTERPRETING PROVIDER:Josefina Stokes,   Indication  ========    isoimmunization.    Method  ======    Transabdominal ultrasound examination. View: Sufficient    Pregnancy  =========    Alcantar pregnancy. Number of fetuses: 1    Dating  ======    Previous Ultrasound on: 2024  Type of prior assessment: GA  GA at prior assessment date 26 w + 0 d  GA by previous U/S 33 w + 0 d  ELIA by previous Ultrasound: 2024  Ultrasound examination on: 2024  GA by U/S based upon: AC, BPD, Femur, HC  GA by U/S 33 w + 4 d  ELIA by U/S: 2024  Assigned: based on ultrasound (GA), selected on 10/9/2024  Assigned GA 33 w + 0 d  Assigned ELIA: 2024    Fetal Biometry  ============    Standard  BPD 81.8 mm 32w 6d 40% Hadlock  .7 mm 36w 2d 98% Mundo  .4 mm 33w 6d 34% Hadlock  .3 mm 33w 6d 77% Hadlock  Femur 65.0 mm 33w 4d 53% Hadlock  EFW 2,253 g 33w 3d 62% Hadlock  EFW (lb) 4 lb  EFW (oz) 15 oz  EFW by: Hadlock (BPD-HC-AC-FL)  Head / Face / Neck  Nasal bone: present  Other Structures   bpm    General Evaluation  ==============    Cardiac activity present.  bpm. Fetal movements: visualized. Presentation: Cephalic  Placenta: Placental site: Anterior  Umbilical cord: Cord vessels: 3 vessel cord,  Amniotic fluid: Amount of AF: normal. MVP 7.6 cm. MARY 21.9 cm. Q1 3.9 cm, Q2 7.4 cm, Q3 7.6 cm, Q4 3.0 cm    Fetal Anatomy  ===========    Face  Nasal bone: present  Heart / Thorax  Ductal arch view: SUBOPTIMAL  Stomach: normal  Kidneys: normal  Bladder: normal  Lumbar spine: normal  Sacral spine: SUBOPTIMAL  Rt hand: SUBOPTIMAL  Rt fingers: SUBOPTIMAL  Lt hand: SUBOPTIMAL  Lt fingers: SUBOPTIMAL  Rt foot: normal  Rt toes: normal  Lt foot: normal  Lt toes: normal  Wants to know fetal sex: yes    Biophysical Profile  ==============    2: Fetal breathing movements  2: Gross body movements  2: Fetal tone  2: Amniotic fluid

## 2024-11-05 NOTE — PROGRESS NOTES
Patient was seen 11/5/2024      Please look under media to view full consult and ultrasound report in ViewPoint.         Josefina Stokes MD   Maternal Fetal Medicine

## 2024-11-12 ENCOUNTER — ROUTINE PRENATAL (OUTPATIENT)
Age: 29
End: 2024-11-12
Payer: MEDICAID

## 2024-11-12 ENCOUNTER — TELEPHONE (OUTPATIENT)
Age: 29
End: 2024-11-12

## 2024-11-12 DIAGNOSIS — Z87.59 HISTORY OF IUFD: ICD-10-CM

## 2024-11-12 DIAGNOSIS — Z3A.34 34 WEEKS GESTATION OF PREGNANCY: Primary | ICD-10-CM

## 2024-11-12 DIAGNOSIS — E66.811 CLASS 1 OBESITY DUE TO EXCESS CALORIES WITHOUT SERIOUS COMORBIDITY WITH BODY MASS INDEX (BMI) OF 32.0 TO 32.9 IN ADULT: ICD-10-CM

## 2024-11-12 DIAGNOSIS — O36.1130 ISOIMMUNIZATION FROM BLOOD GROUP INCOMPATIBILITY DURING PREGNANCY IN THIRD TRIMESTER, SINGLE OR UNSPECIFIED FETUS: Primary | ICD-10-CM

## 2024-11-12 DIAGNOSIS — E66.09 CLASS 1 OBESITY DUE TO EXCESS CALORIES WITHOUT SERIOUS COMORBIDITY WITH BODY MASS INDEX (BMI) OF 32.0 TO 32.9 IN ADULT: ICD-10-CM

## 2024-11-12 DIAGNOSIS — O36.1130 ISOIMMUNIZATION FROM BLOOD GROUP INCOMPATIBILITY DURING PREGNANCY IN THIRD TRIMESTER, SINGLE OR UNSPECIFIED FETUS: ICD-10-CM

## 2024-11-12 PROCEDURE — 99213 OFFICE O/P EST LOW 20 MIN: CPT

## 2024-11-12 PROCEDURE — 76821 MIDDLE CEREBRAL ARTERY ECHO: CPT | Performed by: STUDENT IN AN ORGANIZED HEALTH CARE EDUCATION/TRAINING PROGRAM

## 2024-11-12 PROCEDURE — 99213 OFFICE O/P EST LOW 20 MIN: CPT | Performed by: STUDENT IN AN ORGANIZED HEALTH CARE EDUCATION/TRAINING PROGRAM

## 2024-11-12 PROCEDURE — 76819 FETAL BIOPHYS PROFIL W/O NST: CPT | Performed by: STUDENT IN AN ORGANIZED HEALTH CARE EDUCATION/TRAINING PROGRAM

## 2024-11-12 NOTE — TELEPHONE ENCOUNTER
Contacted patient and left voicemail in regards to her contacting her OB to schedule her glucola 1 hr diabetes test. Also confirm MFM appointment with her and let her know she still needs to schedule an appointment with her OB for this test as well.

## 2024-11-12 NOTE — PROGRESS NOTES
pt)  Repeat anti E titers: Pending  ANT: weekly BPP and Growth q4 weeks   Delivery at 37.0 unless other clinical indications arise.     Mahad Phipps (:  1995) is a 29 y.o. female,Established patient, here for evaluation of the following chief complaint(s):  1. 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 Class 1 obesity due to excess calories without serious comorbidity with body mass index (BMI) of 32.0 to 32.9 in adult  3. History of IUFD    Objective   Physical Exam  Vitals reviewed.   Constitutional:       Appearance: Normal appearance.   Neurological:      Mental Status: She is alert.   Psychiatric:         Mood and Affect: Mood normal.         Judgment: Judgment normal.     On this date 2024 I have spent 25 minutes reviewing previous notes, test results and face to face with the patient discussing the diagnosis and importance of compliance with the treatment plan as well as documenting on the day of the visit.      An electronic signature was used to authenticate this note.    --ANDI Gupta - CNP

## 2024-11-12 NOTE — PROCEDURES
PATIENT: CELIA HEATH   -  : 1995   -  DOS:2024   -  INTERPRETING PROVIDER:Josefina Stokes,   Indication  ========    Isoimmunization    Method  ======    Transabdominal ultrasound examination. View: Suboptimal view: limited by late gestational age    Pregnancy  =========    Alcantar pregnancy. Number of fetuses: 1    Dating  ======    Previous Ultrasound on: 2024  Type of prior assessment: GA  GA at prior assessment date 26 w + 0 d  GA by previous U/S 34 w + 0 d  ELIA by previous Ultrasound: 2024  Assigned: based on ultrasound (GA), selected on 10/9/2024  Assigned GA 34 w + 0 d  Assigned ELIA: 2024    General Evaluation  ==============    Cardiac activity present.  bpm. Fetal movements: visualized. Presentation: Cephalic  Placenta: Placental site: Anterior  Umbilical cord: Cord vessels: 3 vessel cord,    Fetal Biometry  ============    Standard  EFW by: Hadlock (BPD-HC-AC-FL)  Head / Face / Neck  Nasal bone: present  Other Structures   bpm    Fetal Anatomy  ===========    Face  Nasal bone: present  Heart / Thorax  Ductal arch view: SUBOPTIMAL  Stomach: normal  Kidneys: normal  Bladder: normal  Sacral spine: SUBOPTIMAL  Rt hand: SUBOPTIMAL  Rt fingers: SUBOPTIMAL  Lt hand: SUBOPTIMAL  Lt fingers: SUBOPTIMAL  Wants to know fetal sex: yes    Amniotic Fluid Assessment  =====================    Amount of AF: normal  MVP 6.2 cm    Biophysical Profile  ==============    2: Fetal breathing movements  2: Gross body movements  2: Fetal tone  2: Amniotic fluid volume  8/8 Biophysical profile score    Fetal Doppler  ===========    Arterial  MCA PS 54.02 cm/s  MoM 1.11  Rt MCA PS 54.02 cm/s  Rt MCA  bpm    Findings  =======    Intrauterine Alcantar pregnancy at 34w 0d by clinical dates.  Amniotic fluid: normal.  Placenta is Anterior.  Biophysical profile score is 8/8.  Cephalic presentation.    MCA dopplers are normal.    Consultation  ==========    NP note

## 2024-11-12 NOTE — PROGRESS NOTES
Patient was seen 11/12/2024      Please look under media to view full consult and ultrasound report in ViewPoint.         Josefina Stokes MD   Maternal Fetal Medicine

## 2024-11-12 NOTE — RESULT ENCOUNTER NOTE
This patient has failed to keep her OB appts following her MFM visits; please reach out by phone again to stress importance of office fu here; can put her anywhere on schedule w me  Now 34wk; needs GBS, anti E titer and indxn scheduled for 37wk

## 2024-11-13 ENCOUNTER — TELEPHONE (OUTPATIENT)
Age: 29
End: 2024-11-13

## 2024-11-13 NOTE — TELEPHONE ENCOUNTER
I have called and spoken to the pt, she is scheduled to see us next Tuesday morning before her MFM appt.

## 2024-11-13 NOTE — TELEPHONE ENCOUNTER
----- Message from Dr. Bernie Gonzalez MD sent at 11/12/2024  3:46 PM EST -----  This patient has failed to keep her OB appts following her MFM visits; please reach out by phone again to stress importance of office fu here; can put her anywhere on schedule w me  Now 34wk; needs GBS, anti E titer and indxn scheduled for 37wk

## 2024-11-18 ENCOUNTER — TELEPHONE (OUTPATIENT)
Age: 29
End: 2024-11-18

## 2024-11-18 NOTE — TELEPHONE ENCOUNTER
Spoke with patient Spouse confirmed name and      He wanted to cx appointment for [facility] @ 10:45- Offered patient [facility] at 8:45 so that they could come following their OB appointment. They confirmed that appointment.   New appointment details are 24 @ 8:45 [facility] after appointment with Dr. Gonzalez.

## 2024-11-19 ENCOUNTER — ROUTINE PRENATAL (OUTPATIENT)
Age: 29
End: 2024-11-19

## 2024-11-19 VITALS
HEIGHT: 65 IN | DIASTOLIC BLOOD PRESSURE: 66 MMHG | HEART RATE: 68 BPM | SYSTOLIC BLOOD PRESSURE: 104 MMHG | BODY MASS INDEX: 34.49 KG/M2 | OXYGEN SATURATION: 98 % | WEIGHT: 207 LBS

## 2024-11-19 DIAGNOSIS — Z34.80 PRENATAL CARE OF MULTIGRAVIDA, ANTEPARTUM: Primary | ICD-10-CM

## 2024-11-19 LAB — GBS, EXTERNAL RESULT: POSITIVE

## 2024-11-19 PROCEDURE — 0502F SUBSEQUENT PRENATAL CARE: CPT | Performed by: OBSTETRICS & GYNECOLOGY

## 2024-11-19 NOTE — CONSULTS
Session ID: [id number]  Language: Swlissetteili   ID: #[id number]   Name: Danielage: Eva   ID: #[id number]   Name: Taisha

## 2024-11-19 NOTE — PROGRESS NOTES
Using , explained importance of seeing me as well as MFM; reviewed recommendation to proceed with indxn 37wk; posted for 12/3 pm; GBS  Antibody screen now

## 2024-11-20 LAB
APTT HEX PL PPP: 4 SEC
APTT IMM NP PPP: ABNORMAL SEC
APTT PPP 1:1 SALINE: ABNORMAL SEC
APTT PPP: 24.9 SEC
B2 GLYCOPROT1 IGA SER-ACNC: 63 SAU
B2 GLYCOPROT1 IGG SER-ACNC: <10 SGU
B2 GLYCOPROT1 IGM SER-ACNC: <10 SMU
CARDIOLIPIN IGA SER IA-ACNC: <10 APL
CARDIOLIPIN IGG SER IA-ACNC: <10 GPL
CARDIOLIPIN IGM SER IA-ACNC: <10 MPL
CONFIRM APTT: 1.1 SEC
CONFIRM DRVVT: ABNORMAL SEC
DRVVT SCREEN TO CONFIRM RATIO: ABNORMAL RATIO
LABORATORY COMMENT REPORT: ABNORMAL
PROTHROM IGG SERPL-ACNC: 4 G UNITS
PS IGG SER IA-ACNC: 0 GPS
PS IGM SER IA-ACNC: 0 MPS
SCREEN DRVVT: 32.6 SEC

## 2024-11-20 NOTE — RESULT ENCOUNTER NOTE
Patient being closely followed by MFM; FOB tested for K antigen and is negative; anti E titer of 2 stable and not concerning; delivery planned for 37th week due to hx IUFD

## 2024-11-21 LAB
BLD GP AB SCN TITR SERPL: NORMAL {TITER}
BLD GP AB SCN TITR SERPL: NORMAL {TITER}
BLOOD BANK CMNT PATIENT-IMP: NORMAL
BLOOD GROUP ANTIBODIES SERPL: NORMAL
BLOOD GROUP ANTIBODIES SERPL: NORMAL
GP B STREP DNA SPEC QL NAA+PROBE: POSITIVE
SPECIMEN SOURCE: ABNORMAL

## 2024-11-25 ENCOUNTER — TELEPHONE (OUTPATIENT)
Age: 29
End: 2024-11-25

## 2024-11-25 NOTE — TELEPHONE ENCOUNTER
Lvm w/ patient trying to confirm location of appointment tomorrow- asked to call [phone]   Retired

## 2024-11-26 ENCOUNTER — TELEPHONE (OUTPATIENT)
Age: 29
End: 2024-11-26

## 2024-11-26 ENCOUNTER — ROUTINE PRENATAL (OUTPATIENT)
Age: 29
End: 2024-11-26

## 2024-11-26 ENCOUNTER — ROUTINE PRENATAL (OUTPATIENT)
Age: 29
End: 2024-11-26
Payer: MEDICAID

## 2024-11-26 VITALS
HEART RATE: 97 BPM | OXYGEN SATURATION: 78 % | WEIGHT: 207 LBS | SYSTOLIC BLOOD PRESSURE: 109 MMHG | BODY MASS INDEX: 34.45 KG/M2 | DIASTOLIC BLOOD PRESSURE: 68 MMHG

## 2024-11-26 VITALS — DIASTOLIC BLOOD PRESSURE: 71 MMHG | SYSTOLIC BLOOD PRESSURE: 109 MMHG | HEART RATE: 77 BPM

## 2024-11-26 DIAGNOSIS — Z34.83 PRENATAL CARE, SUBSEQUENT PREGNANCY IN THIRD TRIMESTER: Primary | ICD-10-CM

## 2024-11-26 DIAGNOSIS — E66.811 CLASS 1 OBESITY DUE TO EXCESS CALORIES WITHOUT SERIOUS COMORBIDITY WITH BODY MASS INDEX (BMI) OF 32.0 TO 32.9 IN ADULT: Primary | ICD-10-CM

## 2024-11-26 DIAGNOSIS — O36.1130 ISOIMMUNIZATION FROM BLOOD GROUP INCOMPATIBILITY DURING PREGNANCY IN THIRD TRIMESTER, SINGLE OR UNSPECIFIED FETUS: Primary | ICD-10-CM

## 2024-11-26 DIAGNOSIS — Z87.59 HISTORY OF IUFD: ICD-10-CM

## 2024-11-26 DIAGNOSIS — O36.1130 ISOIMMUNIZATION FROM BLOOD GROUP INCOMPATIBILITY DURING PREGNANCY IN THIRD TRIMESTER, SINGLE OR UNSPECIFIED FETUS: ICD-10-CM

## 2024-11-26 DIAGNOSIS — E66.09 CLASS 1 OBESITY DUE TO EXCESS CALORIES WITHOUT SERIOUS COMORBIDITY WITH BODY MASS INDEX (BMI) OF 32.0 TO 32.9 IN ADULT: Primary | ICD-10-CM

## 2024-11-26 PROCEDURE — 76819 FETAL BIOPHYS PROFIL W/O NST: CPT | Performed by: STUDENT IN AN ORGANIZED HEALTH CARE EDUCATION/TRAINING PROGRAM

## 2024-11-26 PROCEDURE — 76821 MIDDLE CEREBRAL ARTERY ECHO: CPT | Performed by: STUDENT IN AN ORGANIZED HEALTH CARE EDUCATION/TRAINING PROGRAM

## 2024-11-26 PROCEDURE — 99213 OFFICE O/P EST LOW 20 MIN: CPT

## 2024-11-26 PROCEDURE — 0502F SUBSEQUENT PRENATAL CARE: CPT | Performed by: OBSTETRICS & GYNECOLOGY

## 2024-11-26 NOTE — PROGRESS NOTES
Patient was seen 11/26/2024      Please look under media to view full consult and ultrasound report in ViewPoint.         Josefina Stokes MD   Maternal Fetal Medicine

## 2024-11-26 NOTE — PROGRESS NOTES
Assessment & Plan   ASSESSMENT/PLAN:  1. 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 History of IUFD    [facility]  utilized today: [employer] #[id number]     CELIA is 29 yrs of age,  who is seen for a pregnancy complicated by:     Anti Iam Isoimmunization:   - Counseling in prior note.   - FOB is NEGATIVE for iam antigen. No increased risk for baby given FOB negative. Will stop following MCA dopplers at this time.     New Anti E titer seen on antibody screen   - A titer of 1:32 or greater, or evidence of hydrops fetalis is often associated with serious hemolytic disease. Maternal titers should be performed at least every 4 weeks.   - Will discuss with GC for paternal testing results from previous lab draw.   - Titer was 2 on . Repeat titer was 2 on .  - Given normal titers , we will stop following MCA dopplers at this time.     Hx of IUFD at 37 weeks / severe preE   - Pregnancy was complicated by FGR, severe preE and abruption. She had a PPH req blood transfusion.   - Taking ld ASA  - Recommend baseline preE labs (CMP, Pr/Cr or 24 hour urine protein)   - APLAS testing wnl; exception Beta-2 Glyco 1 IgA 63 is elevated. Recommend repeat in 12 weeks.   - Given history would recommend delivery at 37.0 unless other clinical indications arise.   - Continue weekly BPP   - Kick count instructions, PIH and PTL precautions reviewed.   - IOL scheduled 12/     1 HR GTT  -Pt reports taking 1 hr gtt, no records found in [facility] or [employer].     Recommendations  Recommend baseline preE labs (CMP, Pr/Cr or 24 hour urine protein)   APLAS testing wnl; exception Beta-2 Glyco 1 IgA 63 is elevated. Recommend repeat in 12 weeks.   ANT: weekly BPP and Growth q4 weeks   Delivery at 37.0 unless other clinical indications arise.     Patient images have been reviewed. Agree with the plan of care as outlined above.   Dr. Stokes    Please see

## 2024-11-26 NOTE — PROGRESS NOTES
Using  line reviewed MFM recommendations and why its recommended to proceed w 37wk indxn; couple in agreement  Doing well  Indxn next week

## 2024-11-26 NOTE — PROCEDURES
PATIENT: CELIA HEATH   -  : 1995   -  DOS:2024   -  INTERPRETING PROVIDER:Josefina Stokes,   Indication  ========    Isoimmunization    Method  ======    Transabdominal ultrasound examination. View: Suboptimal view: limited by maternal body habitus. Suboptimal view: limited by fetal position. Suboptimal view: limited by  late gestational age    Pregnancy  =========    Alcantar pregnancy. Number of fetuses: 1    Dating  ======    Previous Ultrasound on: 2024  Type of prior assessment: GA  GA at prior assessment date 26 w + 0 d  GA by previous U/S 36 w + 0 d  ELIA by previous Ultrasound: 2024  Assigned: based on ultrasound (GA), selected on 10/9/2024  Assigned GA 36 w + 0 d  Assigned ELIA: 2024    General Evaluation  ==============    Cardiac activity present.  bpm. Fetal movements: visualized. Presentation: Cephalic  Placenta: Placental site: Anterior  Umbilical cord: Cord vessels: 3 vessel cord    Fetal Biometry  ============    Standard  EFW by: Hadlock (BPD-HC-AC-FL)  Head / Face / Neck  Nasal bone: present  Other Structures   bpm    Fetal Anatomy  ===========    Face  Nasal bone: present  Heart / Thorax  Ductal arch view: SUBOPTIMAL  Stomach: normal  Kidneys: normal  Bladder: normal  Sacral spine: SUBOPTIMAL  Rt hand: SUBOPTIMAL  Rt fingers: SUBOPTIMAL  Lt hand: SUBOPTIMAL  Lt fingers: SUBOPTIMAL  Wants to know fetal sex: yes    Amniotic Fluid Assessment  =====================    Amount of AF: normal  MVP 8.3 cm. MARY 17.6 cm. Q1 8.1 cm, Q2 0.0 cm, Q3 1.2 cm, Q4 8.3 cm    Biophysical Profile  ==============    2: Fetal breathing movements  2: Gross body movements  2: Fetal tone  2: Amniotic fluid volume  8 Biophysical profile score    Fetal Doppler  ===========    Arterial  Mid cerebral artery: normal  MCA PI 2.15 77% Ebbing  MCA PS 54.08 cm/s  MoM 1.01  Right mid cerebral artery: normal  Rt MCA PI 2.15 77% Ebbing  Rt MCA RI 0.86 95% Bahlmann  Rt MCA PS 54.08

## 2024-11-26 NOTE — TELEPHONE ENCOUNTER
Appointment changed by 15 min from 10:45 on 12/3 to 10:15 @ [facility]    Updated paper appointment reminder given to patient with time and address

## 2024-11-29 ENCOUNTER — HOSPITAL ENCOUNTER (EMERGENCY)
Facility: HOSPITAL | Age: 29
Discharge: HOME | End: 2024-11-29
Attending: STUDENT IN AN ORGANIZED HEALTH CARE EDUCATION/TRAINING PROGRAM
Payer: MEDICAID

## 2024-11-29 ENCOUNTER — APPOINTMENT (OUTPATIENT)
Facility: HOSPITAL | Age: 29
End: 2024-11-29
Payer: MEDICAID

## 2024-11-29 VITALS
RESPIRATION RATE: 32 BRPM | TEMPERATURE: 97.4 F | SYSTOLIC BLOOD PRESSURE: 122 MMHG | OXYGEN SATURATION: 100 % | DIASTOLIC BLOOD PRESSURE: 62 MMHG | HEART RATE: 87 BPM

## 2024-11-29 DIAGNOSIS — Z3A.36 36 WEEKS GESTATION OF PREGNANCY: ICD-10-CM

## 2024-11-29 DIAGNOSIS — S93.401A SPRAIN OF RIGHT ANKLE, UNSPECIFIED LIGAMENT, INITIAL ENCOUNTER: Primary | ICD-10-CM

## 2024-11-29 PROCEDURE — 6370000000 HC RX 637 (ALT 250 FOR IP): Performed by: EMERGENCY MEDICINE

## 2024-11-29 PROCEDURE — 73600 X-RAY EXAM OF ANKLE: CPT

## 2024-11-29 PROCEDURE — 99283 EMERGENCY DEPT VISIT LOW MDM: CPT

## 2024-11-29 PROCEDURE — 73590 X-RAY EXAM OF LOWER LEG: CPT

## 2024-11-29 PROCEDURE — 73620 X-RAY EXAM OF FOOT: CPT

## 2024-11-29 RX ORDER — ACETAMINOPHEN 500 MG
1000 TABLET ORAL
Status: COMPLETED | OUTPATIENT
Start: 2024-11-29 | End: 2024-11-29

## 2024-11-29 RX ADMIN — ACETAMINOPHEN 1000 MG: 500 TABLET ORAL at 22:02

## 2024-11-29 ASSESSMENT — PAIN DESCRIPTION - LOCATION
LOCATION: LEG
LOCATION: FOOT

## 2024-11-29 ASSESSMENT — PAIN - FUNCTIONAL ASSESSMENT
PAIN_FUNCTIONAL_ASSESSMENT: 0-10
PAIN_FUNCTIONAL_ASSESSMENT: ACTIVITIES ARE NOT PREVENTED
PAIN_FUNCTIONAL_ASSESSMENT: PREVENTS OR INTERFERES SOME ACTIVE ACTIVITIES AND ADLS

## 2024-11-29 ASSESSMENT — PAIN DESCRIPTION - ORIENTATION
ORIENTATION: RIGHT
ORIENTATION: RIGHT

## 2024-11-29 ASSESSMENT — ENCOUNTER SYMPTOMS
BACK PAIN: 0
COUGH: 0
COLOR CHANGE: 0
DIARRHEA: 0
SHORTNESS OF BREATH: 0
VOMITING: 0
ABDOMINAL PAIN: 0
SORE THROAT: 0

## 2024-11-29 ASSESSMENT — PAIN SCALES - GENERAL
PAINLEVEL_OUTOF10: 7
PAINLEVEL_OUTOF10: 10

## 2024-11-29 ASSESSMENT — PAIN DESCRIPTION - PAIN TYPE: TYPE: ACUTE PAIN

## 2024-11-29 ASSESSMENT — PAIN DESCRIPTION - FREQUENCY: FREQUENCY: CONTINUOUS

## 2024-11-29 ASSESSMENT — PAIN DESCRIPTION - ONSET: ONSET: SUDDEN

## 2024-11-29 ASSESSMENT — PAIN DESCRIPTION - DESCRIPTORS: DESCRIPTORS: ACHING

## 2024-11-29 NOTE — ED PROVIDER NOTES
[facility] EMERGENCY DEP  EMERGENCY DEPARTMENT ENCOUNTER      Pt Name: Shimon Phipps  MRN: [id number]  Birthdate 1995  Date of evaluation: 11/29/2024  Provider: CHILO Smith    CHIEF COMPLAINT       Chief Complaint   Patient presents with    Leg Injury         HISTORY OF PRESENT ILLNESS   (Location/Symptom, Timing/Onset, Context/Setting, Quality, Duration, Modifying Factors, Severity)  Note limiting factors.   Shimon Phipps is a 29 y.o. female with past medical history as listed below who presents to the emergency department for evaluation after a fall while at work.  She states about 2 hours prior to arrival she was walking and slipped, causing her leg to twist underneath her.  She denies head injury.  She is currently 36 weeks pregnant, due date December 24, states that she did not fall onto her abdomen.  She denies any back pain, abdominal pain since the fall or any vaginal leakage or bleeding.  She states she has had normal fetal movement since fall.  Is receiving prenatal care.    Nursing Notes were reviewed.    REVIEW OF SYSTEMS    (2-9 systems for level 4, 10 or more for level 5)     Review of Systems   Constitutional:  Negative for fever.   HENT:  Negative for congestion and sore throat.    Eyes:  Negative for visual disturbance.   Respiratory:  Negative for cough and shortness of breath.    Cardiovascular:  Negative for chest pain.   Gastrointestinal:  Negative for abdominal pain, diarrhea and vomiting.   Genitourinary:  Negative for dysuria.   Musculoskeletal:  Positive for arthralgias. Negative for back pain and neck pain.   Skin:  Negative for color change.   Neurological:  Negative for dizziness and headaches.   Psychiatric/Behavioral:  Negative for confusion.        Except as noted above the remainder of the review of systems was reviewed and negative.       PAST MEDICAL HISTORY     Past Medical History:   Diagnosis Date    Routine screening for STI (sexually transmitted infection)

## 2024-11-29 NOTE — ED TRIAGE NOTES
She fell at work, twisting her ankle around 4:30p. She is 36 weeks pregnant. She denies falling on her abdomen. She says only her leg was injured. She rates the pain 10/10.

## 2024-12-02 ENCOUNTER — TELEPHONE (OUTPATIENT)
Age: 29
End: 2024-12-02

## 2024-12-03 ENCOUNTER — TELEPHONE (OUTPATIENT)
Age: 29
End: 2024-12-03

## 2024-12-03 NOTE — TELEPHONE ENCOUNTER
I called and left message through  line that we received her message and per Dr. Gonzalez fine to wait until Monday for induction. Call back with questions.

## 2024-12-31 ENCOUNTER — HOSPITAL ENCOUNTER (INPATIENT)
Facility: HOSPITAL | Age: 29
LOS: 5 days | Discharge: HOME | DRG: 540 | End: 2025-01-05
Attending: OBSTETRICS & GYNECOLOGY | Admitting: OBSTETRICS & GYNECOLOGY
Payer: MEDICAID

## 2024-12-31 PROBLEM — Z3A.41 41 WEEKS GESTATION OF PREGNANCY: Status: ACTIVE | Noted: 2024-12-31

## 2024-12-31 PROBLEM — O48.0 41 WEEKS GESTATION OF PREGNANCY: Status: ACTIVE | Noted: 2024-12-31

## 2024-12-31 LAB
ERYTHROCYTE [DISTWIDTH] IN BLOOD BY AUTOMATED COUNT: 15.9 % (ref 11.5–14.5)
HCT VFR BLD AUTO: 32.8 % (ref 35–47)
HGB BLD-MCNC: 10.1 G/DL (ref 11.5–16)
MCH RBC QN AUTO: 22.2 PG (ref 26–34)
MCHC RBC AUTO-ENTMCNC: 30.8 G/DL (ref 30–36.5)
MCV RBC AUTO: 72.2 FL (ref 80–99)
NRBC # BLD: 0 K/UL (ref 0–0.01)
NRBC BLD-RTO: 0 PER 100 WBC
PLATELET # BLD AUTO: 147 K/UL (ref 150–400)
RBC # BLD AUTO: 4.54 M/UL (ref 3.8–5.2)
WBC # BLD AUTO: 6.7 K/UL (ref 3.6–11)

## 2024-12-31 PROCEDURE — 2580000003 HC RX 258: Performed by: MIDWIFE

## 2024-12-31 PROCEDURE — 86920 COMPATIBILITY TEST SPIN: CPT

## 2024-12-31 PROCEDURE — 36415 COLL VENOUS BLD VENIPUNCTURE: CPT

## 2024-12-31 PROCEDURE — 85027 COMPLETE CBC AUTOMATED: CPT

## 2024-12-31 PROCEDURE — 86592 SYPHILIS TEST NON-TREP QUAL: CPT

## 2024-12-31 PROCEDURE — 2580000003 HC RX 258: Performed by: ADVANCED PRACTICE MIDWIFE

## 2024-12-31 PROCEDURE — APPNB45 APP NON BILLABLE 31-45 MINUTES: Performed by: MIDWIFE

## 2024-12-31 PROCEDURE — 86921 COMPATIBILITY TEST INCUBATE: CPT

## 2024-12-31 PROCEDURE — 6360000002 HC RX W HCPCS: Performed by: MIDWIFE

## 2024-12-31 PROCEDURE — 1100000000 HC RM PRIVATE

## 2024-12-31 PROCEDURE — 86850 RBC ANTIBODY SCREEN: CPT

## 2024-12-31 PROCEDURE — 86922 COMPATIBILITY TEST ANTIGLOB: CPT

## 2024-12-31 PROCEDURE — 10907ZC DRAINAGE OF AMNIOTIC FLUID, THERAPEUTIC FROM PRODUCTS OF CONCEPTION, VIA NATURAL OR ARTIFICIAL OPENING: ICD-10-PCS | Performed by: OBSTETRICS & GYNECOLOGY

## 2024-12-31 PROCEDURE — 86902 BLOOD TYPE ANTIGEN DONOR EA: CPT

## 2024-12-31 PROCEDURE — 6360000002 HC RX W HCPCS: Performed by: ADVANCED PRACTICE MIDWIFE

## 2024-12-31 PROCEDURE — 86870 RBC ANTIBODY IDENTIFICATION: CPT

## 2024-12-31 PROCEDURE — 7210000100 HC LABOR FEE PER 1 HR

## 2024-12-31 PROCEDURE — 86900 BLOOD TYPING SEROLOGIC ABO: CPT

## 2024-12-31 PROCEDURE — 86901 BLOOD TYPING SEROLOGIC RH(D): CPT

## 2024-12-31 RX ORDER — DOCUSATE SODIUM 100 MG/1
100 CAPSULE, LIQUID FILLED ORAL 2 TIMES DAILY
Status: DISCONTINUED | OUTPATIENT
Start: 2024-12-31 | End: 2025-01-01 | Stop reason: HOSPADM

## 2024-12-31 RX ORDER — METHYLERGONOVINE MALEATE 0.2 MG/ML
200 INJECTION INTRAVENOUS PRN
Status: DISCONTINUED | OUTPATIENT
Start: 2024-12-31 | End: 2025-01-01 | Stop reason: HOSPADM

## 2024-12-31 RX ORDER — SODIUM CHLORIDE 9 MG/ML
INJECTION, SOLUTION INTRAVENOUS PRN
Status: DISCONTINUED | OUTPATIENT
Start: 2024-12-31 | End: 2025-01-01 | Stop reason: HOSPADM

## 2024-12-31 RX ORDER — CARBOPROST TROMETHAMINE 250 UG/ML
250 INJECTION, SOLUTION INTRAMUSCULAR PRN
Status: DISCONTINUED | OUTPATIENT
Start: 2024-12-31 | End: 2025-01-01 | Stop reason: HOSPADM

## 2024-12-31 RX ORDER — SODIUM CHLORIDE, SODIUM LACTATE, POTASSIUM CHLORIDE, AND CALCIUM CHLORIDE .6; .31; .03; .02 G/100ML; G/100ML; G/100ML; G/100ML
500 INJECTION, SOLUTION INTRAVENOUS PRN
Status: DISCONTINUED | OUTPATIENT
Start: 2024-12-31 | End: 2025-01-01 | Stop reason: HOSPADM

## 2024-12-31 RX ORDER — SODIUM CHLORIDE 0.9 % (FLUSH) 0.9 %
5-40 SYRINGE (ML) INJECTION PRN
Status: DISCONTINUED | OUTPATIENT
Start: 2024-12-31 | End: 2025-01-01 | Stop reason: HOSPADM

## 2024-12-31 RX ORDER — ACETAMINOPHEN 325 MG/1
650 TABLET ORAL EVERY 4 HOURS PRN
Status: DISCONTINUED | OUTPATIENT
Start: 2024-12-31 | End: 2025-01-01 | Stop reason: HOSPADM

## 2024-12-31 RX ORDER — SODIUM CHLORIDE 0.9 % (FLUSH) 0.9 %
5-40 SYRINGE (ML) INJECTION EVERY 12 HOURS SCHEDULED
Status: DISCONTINUED | OUTPATIENT
Start: 2024-12-31 | End: 2025-01-01 | Stop reason: HOSPADM

## 2024-12-31 RX ORDER — MISOPROSTOL 200 UG/1
400 TABLET ORAL PRN
Status: DISCONTINUED | OUTPATIENT
Start: 2024-12-31 | End: 2025-01-05 | Stop reason: HOSPADM

## 2024-12-31 RX ORDER — LIDOCAINE HYDROCHLORIDE 10 MG/ML
INJECTION, SOLUTION INFILTRATION; PERINEURAL
Status: DISCONTINUED
Start: 2024-12-31 | End: 2025-01-01 | Stop reason: WASHOUT

## 2024-12-31 RX ORDER — SODIUM CHLORIDE, SODIUM LACTATE, POTASSIUM CHLORIDE, CALCIUM CHLORIDE 600; 310; 30; 20 MG/100ML; MG/100ML; MG/100ML; MG/100ML
INJECTION, SOLUTION INTRAVENOUS CONTINUOUS
Status: DISCONTINUED | OUTPATIENT
Start: 2024-12-31 | End: 2025-01-01 | Stop reason: HOSPADM

## 2024-12-31 RX ORDER — SIMETHICONE 80 MG
80 TABLET,CHEWABLE ORAL EVERY 6 HOURS PRN
Status: DISCONTINUED | OUTPATIENT
Start: 2024-12-31 | End: 2025-01-01 | Stop reason: HOSPADM

## 2024-12-31 RX ORDER — ONDANSETRON 2 MG/ML
4 INJECTION INTRAMUSCULAR; INTRAVENOUS EVERY 6 HOURS PRN
Status: DISCONTINUED | OUTPATIENT
Start: 2024-12-31 | End: 2025-01-01 | Stop reason: HOSPADM

## 2024-12-31 RX ORDER — ONDANSETRON 4 MG/1
4 TABLET, ORALLY DISINTEGRATING ORAL EVERY 6 HOURS PRN
Status: DISCONTINUED | OUTPATIENT
Start: 2024-12-31 | End: 2025-01-01 | Stop reason: HOSPADM

## 2024-12-31 RX ORDER — SODIUM CHLORIDE, SODIUM LACTATE, POTASSIUM CHLORIDE, AND CALCIUM CHLORIDE .6; .31; .03; .02 G/100ML; G/100ML; G/100ML; G/100ML
1000 INJECTION, SOLUTION INTRAVENOUS PRN
Status: DISCONTINUED | OUTPATIENT
Start: 2024-12-31 | End: 2025-01-01 | Stop reason: HOSPADM

## 2024-12-31 RX ORDER — TERBUTALINE SULFATE 1 MG/ML
0.25 INJECTION, SOLUTION SUBCUTANEOUS
Status: DISCONTINUED | OUTPATIENT
Start: 2024-12-31 | End: 2025-01-01 | Stop reason: HOSPADM

## 2024-12-31 RX ORDER — SEVOFLURANE 250 ML/250ML
1 LIQUID RESPIRATORY (INHALATION) CONTINUOUS PRN
Status: DISCONTINUED | OUTPATIENT
Start: 2024-12-31 | End: 2025-01-01 | Stop reason: HOSPADM

## 2024-12-31 RX ADMIN — OXYTOCIN 1 MILLI-UNITS/MIN: 10 INJECTION, SOLUTION INTRAMUSCULAR; INTRAVENOUS at 23:03

## 2024-12-31 RX ADMIN — PENICILLIN G POTASSIUM 5 MILLION UNITS: 5000000 INJECTION, POWDER, FOR SOLUTION INTRAMUSCULAR; INTRAVENOUS at 21:00

## 2024-12-31 RX ADMIN — SODIUM CHLORIDE: 9 INJECTION, SOLUTION INTRAVENOUS at 21:00

## 2025-01-01 ENCOUNTER — ANESTHESIA (OUTPATIENT)
Facility: HOSPITAL | Age: 30
End: 2025-01-01
Payer: MEDICAID

## 2025-01-01 ENCOUNTER — ANESTHESIA EVENT (OUTPATIENT)
Facility: HOSPITAL | Age: 30
End: 2025-01-01
Payer: MEDICAID

## 2025-01-01 LAB
ERYTHROCYTE [DISTWIDTH] IN BLOOD BY AUTOMATED COUNT: 17 % (ref 11.5–14.5)
HCT VFR BLD AUTO: 34.6 % (ref 35–47)
HGB BLD-MCNC: 10.5 G/DL (ref 11.5–16)
HISTORY CHECK: NORMAL
MCH RBC QN AUTO: 23.5 PG (ref 26–34)
MCHC RBC AUTO-ENTMCNC: 30.3 G/DL (ref 30–36.5)
MCV RBC AUTO: 77.6 FL (ref 80–99)
NRBC # BLD: 0 K/UL (ref 0–0.01)
NRBC BLD-RTO: 0 PER 100 WBC
PLATELET # BLD AUTO: 129 K/UL (ref 150–400)
RBC # BLD AUTO: 4.46 M/UL (ref 3.8–5.2)
WBC # BLD AUTO: 13.7 K/UL (ref 3.6–11)

## 2025-01-01 PROCEDURE — 2580000003 HC RX 258: Performed by: ADVANCED PRACTICE MIDWIFE

## 2025-01-01 PROCEDURE — 3700000000 HC ANESTHESIA ATTENDED CARE: Performed by: OBSTETRICS & GYNECOLOGY

## 2025-01-01 PROCEDURE — P9016 RBC LEUKOCYTES REDUCED: HCPCS

## 2025-01-01 PROCEDURE — 88307 TISSUE EXAM BY PATHOLOGIST: CPT

## 2025-01-01 PROCEDURE — 7100000000 HC PACU RECOVERY - FIRST 15 MIN: Performed by: OBSTETRICS & GYNECOLOGY

## 2025-01-01 PROCEDURE — 2500000003 HC RX 250 WO HCPCS: Performed by: ADVANCED PRACTICE MIDWIFE

## 2025-01-01 PROCEDURE — 99283 EMERGENCY DEPT VISIT LOW MDM: CPT

## 2025-01-01 PROCEDURE — APPNB45 APP NON BILLABLE 31-45 MINUTES: Performed by: MIDWIFE

## 2025-01-01 PROCEDURE — 2500000003 HC RX 250 WO HCPCS: Performed by: STUDENT IN AN ORGANIZED HEALTH CARE EDUCATION/TRAINING PROGRAM

## 2025-01-01 PROCEDURE — 85027 COMPLETE CBC AUTOMATED: CPT

## 2025-01-01 PROCEDURE — 6360000002 HC RX W HCPCS: Performed by: OBSTETRICS & GYNECOLOGY

## 2025-01-01 PROCEDURE — 2580000003 HC RX 258: Performed by: NURSE ANESTHETIST, CERTIFIED REGISTERED

## 2025-01-01 PROCEDURE — 1120000000 HC RM PRIVATE OB

## 2025-01-01 PROCEDURE — 36415 COLL VENOUS BLD VENIPUNCTURE: CPT

## 2025-01-01 PROCEDURE — 2709999900 HC NON-CHARGEABLE SUPPLY: Performed by: OBSTETRICS & GYNECOLOGY

## 2025-01-01 PROCEDURE — 3700000001 HC ADD 15 MINUTES (ANESTHESIA): Performed by: OBSTETRICS & GYNECOLOGY

## 2025-01-01 PROCEDURE — 6360000002 HC RX W HCPCS: Performed by: ADVANCED PRACTICE MIDWIFE

## 2025-01-01 PROCEDURE — 6370000000 HC RX 637 (ALT 250 FOR IP): Performed by: ADVANCED PRACTICE MIDWIFE

## 2025-01-01 PROCEDURE — 2580000003 HC RX 258: Performed by: MIDWIFE

## 2025-01-01 PROCEDURE — P9045 ALBUMIN (HUMAN), 5%, 250 ML: HCPCS | Performed by: STUDENT IN AN ORGANIZED HEALTH CARE EDUCATION/TRAINING PROGRAM

## 2025-01-01 PROCEDURE — 7100000001 HC PACU RECOVERY - ADDTL 15 MIN: Performed by: OBSTETRICS & GYNECOLOGY

## 2025-01-01 PROCEDURE — 3609079900 HC CESAREAN SECTION: Performed by: OBSTETRICS & GYNECOLOGY

## 2025-01-01 PROCEDURE — 04QE0ZZ REPAIR RIGHT INTERNAL ILIAC ARTERY, OPEN APPROACH: ICD-10-PCS | Performed by: OBSTETRICS & GYNECOLOGY

## 2025-01-01 PROCEDURE — 7210000100 HC LABOR FEE PER 1 HR

## 2025-01-01 PROCEDURE — 6360000002 HC RX W HCPCS: Performed by: STUDENT IN AN ORGANIZED HEALTH CARE EDUCATION/TRAINING PROGRAM

## 2025-01-01 RX ORDER — IBUPROFEN 400 MG/1
800 TABLET, FILM COATED ORAL EVERY 8 HOURS
Status: DISCONTINUED | OUTPATIENT
Start: 2025-01-01 | End: 2025-01-05 | Stop reason: HOSPADM

## 2025-01-01 RX ORDER — WATER 10 ML/10ML
INJECTION INTRAMUSCULAR; INTRAVENOUS; SUBCUTANEOUS
Status: DISPENSED
Start: 2025-01-01 | End: 2025-01-01

## 2025-01-01 RX ORDER — NALOXONE HYDROCHLORIDE 0.4 MG/ML
INJECTION, SOLUTION INTRAMUSCULAR; INTRAVENOUS; SUBCUTANEOUS PRN
Status: ACTIVE | OUTPATIENT
Start: 2025-01-01 | End: 2025-01-02

## 2025-01-01 RX ORDER — SIMETHICONE 80 MG
80 TABLET,CHEWABLE ORAL EVERY 6 HOURS PRN
Status: DISCONTINUED | OUTPATIENT
Start: 2025-01-01 | End: 2025-01-05 | Stop reason: HOSPADM

## 2025-01-01 RX ORDER — SODIUM CHLORIDE 9 MG/ML
INJECTION, SOLUTION INTRAVENOUS
Status: DISCONTINUED | OUTPATIENT
Start: 2025-01-01 | End: 2025-01-01 | Stop reason: SDUPTHER

## 2025-01-01 RX ORDER — SODIUM CHLORIDE 9 MG/ML
INJECTION, SOLUTION INTRAVENOUS PRN
Status: DISCONTINUED | OUTPATIENT
Start: 2025-01-01 | End: 2025-01-05 | Stop reason: HOSPADM

## 2025-01-01 RX ORDER — MODIFIED LANOLIN
OINTMENT (GRAM) TOPICAL
Status: DISCONTINUED | OUTPATIENT
Start: 2025-01-01 | End: 2025-01-05 | Stop reason: HOSPADM

## 2025-01-01 RX ORDER — DIPHENHYDRAMINE HCL 25 MG
25 CAPSULE ORAL EVERY 6 HOURS PRN
Status: ACTIVE | OUTPATIENT
Start: 2025-01-01 | End: 2025-01-02

## 2025-01-01 RX ORDER — OXYCODONE HYDROCHLORIDE 5 MG/1
10 TABLET ORAL EVERY 4 HOURS PRN
Status: ACTIVE | OUTPATIENT
Start: 2025-01-01 | End: 2025-01-02

## 2025-01-01 RX ORDER — ONDANSETRON 2 MG/ML
4 INJECTION INTRAMUSCULAR; INTRAVENOUS EVERY 6 HOURS PRN
Status: DISCONTINUED | OUTPATIENT
Start: 2025-01-01 | End: 2025-01-05 | Stop reason: HOSPADM

## 2025-01-01 RX ORDER — FENTANYL CITRATE 50 UG/ML
INJECTION, SOLUTION INTRAMUSCULAR; INTRAVENOUS
Status: DISCONTINUED | OUTPATIENT
Start: 2025-01-01 | End: 2025-01-01 | Stop reason: SDUPTHER

## 2025-01-01 RX ORDER — POLYETHYLENE GLYCOL 3350 17 G/17G
17 POWDER, FOR SOLUTION ORAL DAILY PRN
Status: DISCONTINUED | OUTPATIENT
Start: 2025-01-01 | End: 2025-01-05 | Stop reason: HOSPADM

## 2025-01-01 RX ORDER — OXYCODONE HYDROCHLORIDE 5 MG/1
5 TABLET ORAL EVERY 4 HOURS PRN
Status: ACTIVE | OUTPATIENT
Start: 2025-01-01 | End: 2025-01-02

## 2025-01-01 RX ORDER — ACETAMINOPHEN 325 MG/1
650 TABLET ORAL EVERY 4 HOURS PRN
Status: ACTIVE | OUTPATIENT
Start: 2025-01-01 | End: 2025-01-02

## 2025-01-01 RX ORDER — SODIUM CHLORIDE 0.9 % (FLUSH) 0.9 %
5-40 SYRINGE (ML) INJECTION PRN
Status: DISCONTINUED | OUTPATIENT
Start: 2025-01-01 | End: 2025-01-05 | Stop reason: HOSPADM

## 2025-01-01 RX ORDER — OXYCODONE HYDROCHLORIDE 5 MG/1
5 TABLET ORAL EVERY 4 HOURS PRN
Status: DISCONTINUED | OUTPATIENT
Start: 2025-01-02 | End: 2025-01-05 | Stop reason: HOSPADM

## 2025-01-01 RX ORDER — SUCCINYLCHOLINE/SOD CL,ISO/PF 200MG/10ML
SYRINGE (ML) INTRAVENOUS
Status: DISCONTINUED | OUTPATIENT
Start: 2025-01-01 | End: 2025-01-01 | Stop reason: SDUPTHER

## 2025-01-01 RX ORDER — CEFAZOLIN SODIUM 1 G/3ML
INJECTION, POWDER, FOR SOLUTION INTRAMUSCULAR; INTRAVENOUS
Status: DISCONTINUED | OUTPATIENT
Start: 2025-01-01 | End: 2025-01-01 | Stop reason: SDUPTHER

## 2025-01-01 RX ORDER — DIPHENHYDRAMINE HYDROCHLORIDE 50 MG/ML
25 INJECTION INTRAMUSCULAR; INTRAVENOUS EVERY 6 HOURS PRN
Status: ACTIVE | OUTPATIENT
Start: 2025-01-01 | End: 2025-01-02

## 2025-01-01 RX ORDER — KETOROLAC TROMETHAMINE 30 MG/ML
30 INJECTION, SOLUTION INTRAMUSCULAR; INTRAVENOUS EVERY 6 HOURS
Status: DISPENSED | OUTPATIENT
Start: 2025-01-01 | End: 2025-01-01

## 2025-01-01 RX ORDER — OXYCODONE HYDROCHLORIDE 5 MG/1
10 TABLET ORAL EVERY 4 HOURS PRN
Status: DISCONTINUED | OUTPATIENT
Start: 2025-01-02 | End: 2025-01-05 | Stop reason: HOSPADM

## 2025-01-01 RX ORDER — DEXAMETHASONE SODIUM PHOSPHATE 4 MG/ML
INJECTION, SOLUTION INTRA-ARTICULAR; INTRALESIONAL; INTRAMUSCULAR; INTRAVENOUS; SOFT TISSUE
Status: DISCONTINUED | OUTPATIENT
Start: 2025-01-01 | End: 2025-01-01 | Stop reason: SDUPTHER

## 2025-01-01 RX ORDER — MISOPROSTOL 200 UG/1
800 TABLET ORAL PRN
Status: DISCONTINUED | OUTPATIENT
Start: 2025-01-01 | End: 2025-01-05 | Stop reason: HOSPADM

## 2025-01-01 RX ORDER — DIPHENHYDRAMINE HYDROCHLORIDE 50 MG/ML
25 INJECTION INTRAMUSCULAR; INTRAVENOUS EVERY 6 HOURS PRN
Status: DISCONTINUED | OUTPATIENT
Start: 2025-01-01 | End: 2025-01-05 | Stop reason: HOSPADM

## 2025-01-01 RX ORDER — SODIUM CHLORIDE, SODIUM LACTATE, POTASSIUM CHLORIDE, CALCIUM CHLORIDE 600; 310; 30; 20 MG/100ML; MG/100ML; MG/100ML; MG/100ML
INJECTION, SOLUTION INTRAVENOUS
Status: DISCONTINUED | OUTPATIENT
Start: 2025-01-01 | End: 2025-01-01 | Stop reason: SDUPTHER

## 2025-01-01 RX ORDER — HYDROMORPHONE HYDROCHLORIDE 2 MG/ML
INJECTION, SOLUTION INTRAMUSCULAR; INTRAVENOUS; SUBCUTANEOUS
Status: DISCONTINUED | OUTPATIENT
Start: 2025-01-01 | End: 2025-01-01 | Stop reason: SDUPTHER

## 2025-01-01 RX ORDER — ALBUMIN HUMAN 50 G/1000ML
SOLUTION INTRAVENOUS
Status: DISCONTINUED | OUTPATIENT
Start: 2025-01-01 | End: 2025-01-01 | Stop reason: SDUPTHER

## 2025-01-01 RX ORDER — ONDANSETRON 4 MG/1
4 TABLET, ORALLY DISINTEGRATING ORAL EVERY 8 HOURS PRN
Status: DISCONTINUED | OUTPATIENT
Start: 2025-01-01 | End: 2025-01-05 | Stop reason: HOSPADM

## 2025-01-01 RX ORDER — ACETAMINOPHEN 500 MG
1000 TABLET ORAL EVERY 8 HOURS SCHEDULED
Status: DISCONTINUED | OUTPATIENT
Start: 2025-01-01 | End: 2025-01-05 | Stop reason: HOSPADM

## 2025-01-01 RX ORDER — TRANEXAMIC ACID 100 MG/ML
INJECTION, SOLUTION INTRAVENOUS
Status: DISPENSED
Start: 2025-01-01 | End: 2025-01-01

## 2025-01-01 RX ORDER — 0.9 % SODIUM CHLORIDE 0.9 %
1000 INTRAVENOUS SOLUTION INTRAVENOUS ONCE
Status: COMPLETED | OUTPATIENT
Start: 2025-01-01 | End: 2025-01-01

## 2025-01-01 RX ORDER — SODIUM CHLORIDE 9 MG/ML
INJECTION, SOLUTION INTRAVENOUS PRN
Status: DISCONTINUED | OUTPATIENT
Start: 2025-01-01 | End: 2025-01-02

## 2025-01-01 RX ORDER — SODIUM CHLORIDE 0.9 % (FLUSH) 0.9 %
5-40 SYRINGE (ML) INJECTION EVERY 12 HOURS SCHEDULED
Status: DISCONTINUED | OUTPATIENT
Start: 2025-01-01 | End: 2025-01-05 | Stop reason: HOSPADM

## 2025-01-01 RX ORDER — SODIUM CHLORIDE, SODIUM LACTATE, POTASSIUM CHLORIDE, CALCIUM CHLORIDE 600; 310; 30; 20 MG/100ML; MG/100ML; MG/100ML; MG/100ML
INJECTION, SOLUTION INTRAVENOUS CONTINUOUS
Status: DISPENSED | OUTPATIENT
Start: 2025-01-01 | End: 2025-01-01

## 2025-01-01 RX ORDER — KETOROLAC TROMETHAMINE 30 MG/ML
30 INJECTION, SOLUTION INTRAMUSCULAR; INTRAVENOUS ONCE
Status: COMPLETED | OUTPATIENT
Start: 2025-01-01 | End: 2025-01-01

## 2025-01-01 RX ORDER — DOCUSATE SODIUM 100 MG/1
100 CAPSULE, LIQUID FILLED ORAL 2 TIMES DAILY PRN
Status: DISCONTINUED | OUTPATIENT
Start: 2025-01-01 | End: 2025-01-05 | Stop reason: HOSPADM

## 2025-01-01 RX ADMIN — FENTANYL CITRATE 100 MCG: 50 INJECTION INTRAMUSCULAR; INTRAVENOUS at 01:48

## 2025-01-01 RX ADMIN — KETOROLAC TROMETHAMINE 30 MG: 30 INJECTION, SOLUTION INTRAMUSCULAR at 23:33

## 2025-01-01 RX ADMIN — FENTANYL CITRATE 100 MCG: 50 INJECTION INTRAMUSCULAR; INTRAVENOUS at 01:58

## 2025-01-01 RX ADMIN — Medication: at 05:42

## 2025-01-01 RX ADMIN — CEFAZOLIN 2 G: 330 INJECTION, POWDER, FOR SOLUTION INTRAMUSCULAR; INTRAVENOUS at 01:43

## 2025-01-01 RX ADMIN — ALBUMIN (HUMAN) 12.5 G: 12.5 INJECTION, SOLUTION INTRAVENOUS at 02:05

## 2025-01-01 RX ADMIN — TRANEXAMIC ACID 1000 MG: 100 INJECTION, SOLUTION INTRAVENOUS at 02:09

## 2025-01-01 RX ADMIN — ALBUMIN (HUMAN) 12.5 G: 12.5 INJECTION, SOLUTION INTRAVENOUS at 02:28

## 2025-01-01 RX ADMIN — FENTANYL CITRATE 50 MCG: 50 INJECTION INTRAMUSCULAR; INTRAVENOUS at 02:14

## 2025-01-01 RX ADMIN — Medication: at 03:54

## 2025-01-01 RX ADMIN — Medication 909 ML/HR: at 01:49

## 2025-01-01 RX ADMIN — PROPOFOL 200 MG: 10 INJECTION, EMULSION INTRAVENOUS at 01:44

## 2025-01-01 RX ADMIN — Medication 200 MG: at 01:44

## 2025-01-01 RX ADMIN — DEXAMETHASONE SODIUM PHOSPHATE 4 MG: 4 INJECTION INTRA-ARTICULAR; INTRALESIONAL; INTRAMUSCULAR; INTRAVENOUS; SOFT TISSUE at 01:58

## 2025-01-01 RX ADMIN — SODIUM CHLORIDE: 9 INJECTION, SOLUTION INTRAVENOUS at 02:30

## 2025-01-01 RX ADMIN — SODIUM CHLORIDE 2.5 MILLION UNITS: 9 INJECTION, SOLUTION INTRAVENOUS at 00:30

## 2025-01-01 RX ADMIN — PROPOFOL 50 MG: 10 INJECTION, EMULSION INTRAVENOUS at 02:25

## 2025-01-01 RX ADMIN — FENTANYL CITRATE 50 MCG: 50 INJECTION INTRAMUSCULAR; INTRAVENOUS at 02:46

## 2025-01-01 RX ADMIN — SODIUM CHLORIDE 1000 ML: 9 INJECTION, SOLUTION INTRAVENOUS at 15:16

## 2025-01-01 RX ADMIN — SODIUM CHLORIDE, POTASSIUM CHLORIDE, SODIUM LACTATE AND CALCIUM CHLORIDE: 600; 310; 30; 20 INJECTION, SOLUTION INTRAVENOUS at 01:34

## 2025-01-01 RX ADMIN — KETOROLAC TROMETHAMINE 30 MG: 30 INJECTION, SOLUTION INTRAMUSCULAR at 10:06

## 2025-01-01 RX ADMIN — FENTANYL CITRATE 50 MCG: 50 INJECTION INTRAMUSCULAR; INTRAVENOUS at 02:28

## 2025-01-01 RX ADMIN — ONDANSETRON 4 MG: 4 TABLET, ORALLY DISINTEGRATING ORAL at 02:28

## 2025-01-01 RX ADMIN — SODIUM CHLORIDE, POTASSIUM CHLORIDE, SODIUM LACTATE AND CALCIUM CHLORIDE: 600; 310; 30; 20 INJECTION, SOLUTION INTRAVENOUS at 02:51

## 2025-01-01 RX ADMIN — ONDANSETRON 4 MG: 2 INJECTION INTRAMUSCULAR; INTRAVENOUS at 10:07

## 2025-01-01 RX ADMIN — HYDROMORPHONE HYDROCHLORIDE 1 MG: 2 INJECTION INTRAMUSCULAR; INTRAVENOUS; SUBCUTANEOUS at 03:06

## 2025-01-01 RX ADMIN — FENTANYL CITRATE 50 MCG: 50 INJECTION INTRAMUSCULAR; INTRAVENOUS at 02:06

## 2025-01-01 RX ADMIN — KETOROLAC TROMETHAMINE 30 MG: 30 INJECTION, SOLUTION INTRAMUSCULAR at 17:10

## 2025-01-01 ASSESSMENT — PAIN SCALES - GENERAL
PAINLEVEL_OUTOF10: 10
PAINLEVEL_OUTOF10: 6
PAINLEVEL_OUTOF10: 7

## 2025-01-01 ASSESSMENT — PAIN DESCRIPTION - LOCATION
LOCATION: ABDOMEN;INCISION
LOCATION: ABDOMEN;INCISION

## 2025-01-01 ASSESSMENT — PAIN DESCRIPTION - ORIENTATION
ORIENTATION: LOWER
ORIENTATION: LOWER

## 2025-01-01 ASSESSMENT — PAIN DESCRIPTION - DESCRIPTORS
DESCRIPTORS: ACHING
DESCRIPTORS: SORE

## 2025-01-01 NOTE — ANESTHESIA PRE PROCEDURE
Department of Anesthesiology  Preprocedure Note       Name:  Shimon Phipps   Age:  29 y.o.  :  1995                                          MRN:  [id number]         Date:  2025      Surgeon: Surgeon(s):  Sukhi Gomez MD Liao, Andy C, DO    Procedure: Procedure(s):   SECTION    Medications prior to admission:   Prior to Admission medications    Medication Sig Start Date End Date Taking? Authorizing Provider   Prenatal Vit w/Kk-Otoncysqx-AZ (PNV PO) Take 1 tablet by mouth daily   Yes Provider, MD Matt   aspirin 81 MG EC tablet Take 1 tablet by mouth daily  Patient not taking: Reported on 2024 10/9/24   Josefina Stokes MD       Current medications:    Current Facility-Administered Medications   Medication Dose Route Frequency Provider Last Rate Last Admin    sterile water injection             azithromycin (ZITHROMAX) 500 mg in sodium chloride 0.9 % 250 mL IVPB ([id number])  500 mg IntraVENous Once Sukhi Gomez MD        0.9 % sodium chloride infusion   IntraVENous PRN Sukhi Gomez MD        tranexamic acid (CYKLOKAPRON) 1000 MG/10ML injection             HYDROmorphone (DILAUDID) 1 mg/mL PCA   IntraVENous Continuous Sukhi Gomez MD        oxytocin (PITOCIN) 30 units in 500 mL infusion  87.3 teresita-units/min IntraVENous Continuous PRN Bel Jimenez APRN - CNM 87.3 mL/hr at 25 0200 87.3 mL/hr at 25 0200    And    oxytocin (PITOCIN) 10 unit bolus from the bag  10 Units IntraVENous PRN Bel Jimenez APRN - CNM        miSOPROStol (CYTOTEC) tablet 400 mcg  400 mcg Buccal PRN Bel Jimenez APRN - CNM        lidocaine 1 % injection             oxytocin (PITOCIN) 30 units in 500 mL infusion  1-20 teresita-units/min IntraVENous Continuous Senait Romeo APRN - CNM   Stopped at 25 0018     Facility-Administered Medications Ordered in Other Encounters   Medication Dose Route Frequency Provider Last Rate Last Admin    propofol infusion

## 2025-01-01 NOTE — OP NOTE
Operative Note    Name: Shimon Phipps   Medical Record Number: [id number]   YOB: 1995  Today's Date: 2025      Pre-operative Diagnosis: NRFM, with Prolonged Fetal Decelerations                                             Arrest Dilation @ 9cm    Post-operative Diagnosis: HODA    Operation: Low Cervical Transverse Procedure(s):   SECTION    Surgeon(s):  Sukhi Gomez MD Liao, Andy C, DO    Anesthesia: General Anesthesia    Prophylactic Antibiotics: Azithromycin and  Ancef  DVT Prophylaxis: Sequential Compression Devices         Fetal Description: rodrigues     Birth Information:   Information for the patient's :  Phoenix Phipps [[id number]]   @[id number]@    Umbilical Cord: Nuchal Cord x  1    Placenta:  spontaneous    Specimens: Placenta Intact           Complications:  Hemorrhage due to uterine artery laceration requiring Alisha stitch    Procedure Detail:      After proper patient identification and consent, the patient was taken to the operating room, where general anesthesia was administered and found to be adequate. Alcantara catheter had been placed using sterile technique.  The patient was prepped and draped in the normal sterile fashion.The abdomen was entered using the Pfannenstiel technique. The peritoneum was entered sharply well superior to the bladder without any apparent injury. The bladder flap was created without difficulty. A low transverse uterine incision was made with the scalpel and extended with blunt finger dissection. Amniotomy was performed and the fluid was small amount clear.  The baby’s head was then delivered atraumatically. The nose and mouth were suctioned. The cord was clamped and cut and the baby was handed off to  staff in attendance. Placenta was then removed from the uterus. The uterus was curettaged with a moist lap pad and cleared of all clots and debris. Right side of hysterotomy extended with uterine artery

## 2025-01-01 NOTE — OP NOTE
Brief Postoperative Note    Patient: Shimon Phipps  YOB: 1995  MRN: [id number]    Date of Procedure: 2025    Pre-Op Diagnosis: NRFM    Post-Op Diagnosis: Same as preoperative diagnosis.      Procedure(s):   SECTION    Surgeon(s):  Sukhi Gomez MD Liao, Andy C, DO    Surgical Assistant: None    Anesthesia: * No anesthesia type entered *     Quantitative Blood Loss (mL) at Procedure End: see QBL    Complications: Postpartum Hemorrhage due to uterine artery bleeding requiring Alisha Stitch    Specimens:   ID Type Source Tests Collected by Time Destination   A : 41wk 1 day gestation  section, prolonged fetal decelerations, potential abruption? Tissue Placenta SURGICAL PATHOLOGY Sukhi Gomez MD 2025 0243         Implants: * No implants in log *    Drains:   Urinary Catheter 25 Alcantara (Active)       Findings: VMI with loose nuchal cord x 1    Electronically Signed by Sukhi Gomez MD on 2025 at 2:56 AM

## 2025-01-01 NOTE — L&D DELIVERY NOTE
Phoenix Phipps [[id number]]      Labor Events     Labor: No   Steroids: None  Cervical Ripening Date/Time:      Antibiotics Received during Labor: Yes  Rupture Date/Time:  24 22:16:00   Rupture Type: AROM  Fluid Color: Meconium  Meconium Consistency: Thin  Fluid Odor: None  Fluid Volume: Moderate  Induction: None  Augmentation: AROM, Oxytocin       Anesthesia    Method: None       Labor Event Times      Labor onset date/time:  24 19:00:00     Dilation complete date/time:        Start pushing date/time:     Decision date/time (emergent ):            Delivery Details      Delivery Date: 25 Delivery Time: 01:47:00   Delivery Type: , Low Transverse  Trial of Labor?: Yes   Categorization: Primary   Priority: emergent               Presentation    Presentation: Vertex       Cord                  Placenta           Lacerations           Vaginal Counts    Initial Count Personnel: SRIKANTH CUEVAS RN  Initial Count Verified By: CHANDRIKA ROSS RN  Intial Sponge Count: Correct  Intial Instruments Count: Correct          Blood Loss  Mother: Shimon Phipps #[id number]     Start of Mother's Information      Delivery Blood Loss   Intrapartum & Postpartum: 24 - 25 025    Delivery Admission: 24 - 25 025         Intrapartum & Postpartum Delivery Admission    None                  End of Mother's Information  Mother: Shimon Phipps #[id number]                Delivery Providers    Delivering clinician: Senait Romeo APRN - CNM     Provider Role     Obstetrician    Tiffanie Cuevas, RN Primary Nurse     Primary  Nurse     NICU Nurse     Neonatologist     Anesthesiologist     Nurse Anesthetist     Nurse Practitioner    Senait Romeo APRN - CNM Midwife     Nursery Nurse     Respiratory Therapist     Scrub Tech     Assistant Surgeon              Montalba Assessment    Living Status: Living        Skin Color:

## 2025-01-01 NOTE — H&P
Living Expenses: Not hard at all   Food Insecurity: Not on file (2023)   Transportation Needs: Not on file   Physical Activity: Not on file   Stress: Not on file   Social Connections: Not on file   Intimate Partner Violence: Not on file   Housing Stability: Not on file       OBJECTIVE:    Temp (24hrs), Av.4 °F (36.9 °C), Min:98.4 °F (36.9 °C), Max:98.4 °F (36.9 °C)    /76   Pulse 85   Temp 98.4 °F (36.9 °C) (Oral)   Resp 18   Wt 93.9 kg (207 lb)   LMP  (LMP Unknown)   BMI 34.45 kg/m²     FHR baseline 140s, accels present, non repetitive variable decel (1), moderate variability  [place] q4-5 minutes, strong    Exam:  General: alert & oriented x3  HEENT:  normal   Lungs:  clear  Cor:  RRR  Abdomen:  Soft, non-tender                    Clinical EFW 7.5lbs  Cervix:  7/100/0, vtx per SRIKANTH Cuevas RN  No fluid noted on exam or perineum   Fluid:  Bulging  Pelvimetry:  AP-good                      Arch- adequate                      Sidewalls- adequate                      Pelvis feels adequate for fetus.  Extremities:  normal, no edema       Impression:  at 41w0d IUP  Labor  Cat 2 tracing  GBS positive  Anemia  Hx Anti E and Za on antibody screen; prior neg screen prior to PPH transfusion; FOB [place] negative  Late prenatal care @ 26w  Hx IUFD @37w- abruption/IUGR/preE w/ SF, PPH with bakri and blood transfusion         Plan:  Admit to L&D for labor  Review and sign consents  CBC, T&S  Continuous monitoring  Reviewed prenatal records    Reviewed plan with patient/partner, all questions asked/answered and they agree with plan  Reviewed with CHRIS Romeo CNM and she is assuming care of patient    ANDI Benitez CNM  9:29 PM

## 2025-01-01 NOTE — CONSULTS
Session ID: [id number]  Language: Swahili   ID: #[id number]   Name: Jarrodage: Eva   ID: #[id number]   Name: Saturnino

## 2025-01-02 LAB
BASOPHILS # BLD: 0 K/UL (ref 0–0.1)
BASOPHILS NFR BLD: 0 % (ref 0–1)
DIFFERENTIAL METHOD BLD: ABNORMAL
EOSINOPHIL # BLD: 0 K/UL (ref 0–0.4)
EOSINOPHIL NFR BLD: 0 % (ref 0–7)
ERYTHROCYTE [DISTWIDTH] IN BLOOD BY AUTOMATED COUNT: 16.8 % (ref 11.5–14.5)
ERYTHROCYTE [DISTWIDTH] IN BLOOD BY AUTOMATED COUNT: 16.8 % (ref 11.5–14.5)
HCT VFR BLD AUTO: 23.5 % (ref 35–47)
HCT VFR BLD AUTO: 25.4 % (ref 35–47)
HGB BLD-MCNC: 7.4 G/DL (ref 11.5–16)
HGB BLD-MCNC: 7.9 G/DL (ref 11.5–16)
IMM GRANULOCYTES # BLD AUTO: 0 K/UL (ref 0–0.04)
IMM GRANULOCYTES NFR BLD AUTO: 1 % (ref 0–0.5)
LYMPHOCYTES # BLD: 0.9 K/UL (ref 0.8–3.5)
LYMPHOCYTES NFR BLD: 11 % (ref 12–49)
MCH RBC QN AUTO: 23.3 PG (ref 26–34)
MCH RBC QN AUTO: 23.6 PG (ref 26–34)
MCHC RBC AUTO-ENTMCNC: 31.1 G/DL (ref 30–36.5)
MCHC RBC AUTO-ENTMCNC: 31.5 G/DL (ref 30–36.5)
MCV RBC AUTO: 74.9 FL (ref 80–99)
MCV RBC AUTO: 75.1 FL (ref 80–99)
MONOCYTES # BLD: 0.4 K/UL (ref 0–1)
MONOCYTES NFR BLD: 5 % (ref 5–13)
NEUTS SEG # BLD: 6.9 K/UL (ref 1.8–8)
NEUTS SEG NFR BLD: 83 % (ref 32–75)
NRBC # BLD: 0 K/UL (ref 0–0.01)
NRBC # BLD: 0 K/UL (ref 0–0.01)
NRBC BLD-RTO: 0 PER 100 WBC
NRBC BLD-RTO: 0 PER 100 WBC
PLATELET # BLD AUTO: 111 K/UL (ref 150–400)
PLATELET # BLD AUTO: 157 K/UL (ref 150–400)
PMV BLD AUTO: 12.2 FL (ref 8.9–12.9)
RBC # BLD AUTO: 3.13 M/UL (ref 3.8–5.2)
RBC # BLD AUTO: 3.39 M/UL (ref 3.8–5.2)
RPR SER QL: NONREACTIVE
WBC # BLD AUTO: 6.5 K/UL (ref 3.6–11)
WBC # BLD AUTO: 8.3 K/UL (ref 3.6–11)

## 2025-01-02 PROCEDURE — 6360000002 HC RX W HCPCS: Performed by: STUDENT IN AN ORGANIZED HEALTH CARE EDUCATION/TRAINING PROGRAM

## 2025-01-02 PROCEDURE — 6370000000 HC RX 637 (ALT 250 FOR IP): Performed by: OBSTETRICS & GYNECOLOGY

## 2025-01-02 PROCEDURE — 36415 COLL VENOUS BLD VENIPUNCTURE: CPT

## 2025-01-02 PROCEDURE — 85025 COMPLETE CBC W/AUTO DIFF WBC: CPT

## 2025-01-02 PROCEDURE — 1120000000 HC RM PRIVATE OB

## 2025-01-02 PROCEDURE — 85027 COMPLETE CBC AUTOMATED: CPT

## 2025-01-02 RX ADMIN — ACETAMINOPHEN 1000 MG: 500 TABLET ORAL at 03:58

## 2025-01-02 RX ADMIN — OXYCODONE HYDROCHLORIDE 10 MG: 5 TABLET ORAL at 08:46

## 2025-01-02 RX ADMIN — DOCUSATE SODIUM 100 MG: 100 CAPSULE, LIQUID FILLED ORAL at 22:30

## 2025-01-02 RX ADMIN — OXYCODONE HYDROCHLORIDE 5 MG: 5 TABLET ORAL at 18:18

## 2025-01-02 RX ADMIN — IBUPROFEN 800 MG: 400 TABLET, FILM COATED ORAL at 06:04

## 2025-01-02 RX ADMIN — ACETAMINOPHEN 1000 MG: 500 TABLET ORAL at 22:31

## 2025-01-02 RX ADMIN — DOCUSATE SODIUM 100 MG: 100 CAPSULE, LIQUID FILLED ORAL at 08:46

## 2025-01-02 RX ADMIN — IBUPROFEN 800 MG: 400 TABLET, FILM COATED ORAL at 22:31

## 2025-01-02 RX ADMIN — ACETAMINOPHEN 1000 MG: 500 TABLET ORAL at 13:02

## 2025-01-02 RX ADMIN — IBUPROFEN 800 MG: 400 TABLET, FILM COATED ORAL at 14:14

## 2025-01-02 RX ADMIN — HYDROMORPHONE HYDROCHLORIDE 0.25 MG: 1 INJECTION, SOLUTION INTRAMUSCULAR; INTRAVENOUS; SUBCUTANEOUS at 00:02

## 2025-01-02 RX ADMIN — OXYCODONE HYDROCHLORIDE 10 MG: 5 TABLET ORAL at 04:29

## 2025-01-02 ASSESSMENT — PAIN DESCRIPTION - LOCATION
LOCATION: ABDOMEN;INCISION
LOCATION: ABDOMEN;INCISION
LOCATION: ABDOMEN
LOCATION: ABDOMEN;INCISION
LOCATION: ABDOMEN;INCISION
LOCATION: INCISION

## 2025-01-02 ASSESSMENT — PAIN - FUNCTIONAL ASSESSMENT
PAIN_FUNCTIONAL_ASSESSMENT: ACTIVITIES ARE NOT PREVENTED

## 2025-01-02 ASSESSMENT — PAIN DESCRIPTION - DESCRIPTORS
DESCRIPTORS: SORE

## 2025-01-02 ASSESSMENT — PAIN DESCRIPTION - ORIENTATION
ORIENTATION: LOWER

## 2025-01-02 ASSESSMENT — PAIN SCALES - GENERAL
PAINLEVEL_OUTOF10: 4
PAINLEVEL_OUTOF10: 4
PAINLEVEL_OUTOF10: 8
PAINLEVEL_OUTOF10: 10
PAINLEVEL_OUTOF10: 4
PAINLEVEL_OUTOF10: 7
PAINLEVEL_OUTOF10: 4

## 2025-01-02 NOTE — ANESTHESIA POSTPROCEDURE EVALUATION
Department of Anesthesiology  Postprocedure Note    Patient: Shimon Phipps  MRN: [id number]  YOB: 1995  Date of evaluation: 2025    Procedure Summary       Date: 25 Room / Location: [facility] L&D OR    Anesthesia Start: 134 Anesthesia Stop: 317    Procedure:  SECTION Diagnosis:     Surgeons: Sukhi Gomez MD Responsible Provider: Aguilar Jansen DO    Anesthesia Type: General ASA Status: 2 - Emergent            Anesthesia Type: General    Stephanie Phase I:      Stephanie Phase II:      Anesthesia Post Evaluation    Patient location during evaluation: bedside  Patient participation: complete - patient participated  Level of consciousness: awake  Pain score: 0  Airway patency: patent  Nausea & Vomiting: no nausea and no vomiting  Cardiovascular status: blood pressure returned to baseline  Respiratory status: acceptable  Hydration status: euvolemic  Comments: /73   Pulse 70   Temp 98.1 °F (36.7 °C) (Oral)   Resp 16   Wt 93.9 kg (207 lb)   SpO2 96%   Breastfeeding Unknown   BMI 34.45 kg/m²     Pain management: adequate        No notable events documented.

## 2025-01-02 NOTE — DISCHARGE INSTRUCTIONS
Postpartum Support Groups  We know that all of us are dealing with a tremendous amount of uncertainty, confusion and disruption to our daily lives, which may result in increased anxiety, depression and fear. If you are feeling unsettled or worse, please know that we are here to help. During this time of increased caution and care for one another, [facility] [place] ([facility]) is offering virtual support groups to ALL MOTHERS in [place] regardless of the age of your child/children as a way to help weather this emotional storm together. Social support is an important part of self-care during this time of physical distancing.  Virtual postpartum support group meetings available at www.postpartumva.org  Warm Line: 372.206.9576    Breastfeeding Support Groups   1st and 3rd Wednesday of each month at [place] from 11a-12 2nd and 4th Tuesday of each month at [place] from 10-11a      [email or web address]-events    Swahili Breastfeeding Education Videos  [email or web address]

## 2025-01-02 NOTE — LACTATION NOTE
. Mother states that she nursed her first 6 babies for 6 months each and also supplemented with formula. Mother states that she wants to nurse and supplement baby. Mother had baby latched during the consult. Audible swallows noted and rhythmic suck observed. Educated mother about maintaining  milk supply. Brought mother a hand pump and showed her how to use. Mother will continue to feed baby on demand and not limit time at the breasts.    Feeding Plan:  Mother will keep baby skin to skin as often as possible, feed on demand, 8-12x/day , respond to feeding cues, obtain latch, listen for audible swallowing, be aware of signs of oxytocin release/ cramping,thirst,sleepiness while breastfeeding, offer both breasts,and will not limit feedings.  Mother agrees to utilize breast massage while nursing to facilitate lactogenesis.

## 2025-01-03 LAB
BASOPHILS # BLD: 0 K/UL (ref 0–0.1)
BASOPHILS NFR BLD: 0 % (ref 0–1)
DIFFERENTIAL METHOD BLD: ABNORMAL
EOSINOPHIL # BLD: 0.2 K/UL (ref 0–0.4)
EOSINOPHIL NFR BLD: 3 % (ref 0–7)
ERYTHROCYTE [DISTWIDTH] IN BLOOD BY AUTOMATED COUNT: 16.9 % (ref 11.5–14.5)
ERYTHROCYTE [DISTWIDTH] IN BLOOD BY AUTOMATED COUNT: 17.9 % (ref 11.5–14.5)
HCT VFR BLD AUTO: 23.5 % (ref 35–47)
HCT VFR BLD AUTO: 26 % (ref 35–47)
HGB BLD-MCNC: 7.3 G/DL (ref 11.5–16)
HGB BLD-MCNC: 8.3 G/DL (ref 11.5–16)
HISTORY CHECK: NORMAL
IMM GRANULOCYTES # BLD AUTO: 0 K/UL (ref 0–0.04)
IMM GRANULOCYTES NFR BLD AUTO: 1 % (ref 0–0.5)
LYMPHOCYTES # BLD: 1.4 K/UL (ref 0.8–3.5)
LYMPHOCYTES NFR BLD: 23 % (ref 12–49)
MCH RBC QN AUTO: 23.7 PG (ref 26–34)
MCH RBC QN AUTO: 24.6 PG (ref 26–34)
MCHC RBC AUTO-ENTMCNC: 31.1 G/DL (ref 30–36.5)
MCHC RBC AUTO-ENTMCNC: 31.9 G/DL (ref 30–36.5)
MCV RBC AUTO: 76.3 FL (ref 80–99)
MCV RBC AUTO: 77.2 FL (ref 80–99)
MONOCYTES # BLD: 0.4 K/UL (ref 0–1)
MONOCYTES NFR BLD: 6 % (ref 5–13)
NEUTS SEG # BLD: 4.2 K/UL (ref 1.8–8)
NEUTS SEG NFR BLD: 67 % (ref 32–75)
NRBC # BLD: 0 K/UL (ref 0–0.01)
NRBC # BLD: 0 K/UL (ref 0–0.01)
NRBC BLD-RTO: 0 PER 100 WBC
NRBC BLD-RTO: 0 PER 100 WBC
PLATELET # BLD AUTO: 144 K/UL (ref 150–400)
PLATELET # BLD AUTO: 145 K/UL (ref 150–400)
PMV BLD AUTO: 11.6 FL (ref 8.9–12.9)
PMV BLD AUTO: 12.6 FL (ref 8.9–12.9)
RBC # BLD AUTO: 3.08 M/UL (ref 3.8–5.2)
RBC # BLD AUTO: 3.37 M/UL (ref 3.8–5.2)
WBC # BLD AUTO: 6 K/UL (ref 3.6–11)
WBC # BLD AUTO: 6.2 K/UL (ref 3.6–11)

## 2025-01-03 PROCEDURE — 97166 OT EVAL MOD COMPLEX 45 MIN: CPT

## 2025-01-03 PROCEDURE — 36430 TRANSFUSION BLD/BLD COMPNT: CPT

## 2025-01-03 PROCEDURE — 36415 COLL VENOUS BLD VENIPUNCTURE: CPT

## 2025-01-03 PROCEDURE — 97162 PT EVAL MOD COMPLEX 30 MIN: CPT

## 2025-01-03 PROCEDURE — 97116 GAIT TRAINING THERAPY: CPT

## 2025-01-03 PROCEDURE — 85025 COMPLETE CBC W/AUTO DIFF WBC: CPT

## 2025-01-03 PROCEDURE — 85027 COMPLETE CBC AUTOMATED: CPT

## 2025-01-03 PROCEDURE — P9016 RBC LEUKOCYTES REDUCED: HCPCS

## 2025-01-03 PROCEDURE — 97530 THERAPEUTIC ACTIVITIES: CPT

## 2025-01-03 PROCEDURE — 1120000000 HC RM PRIVATE OB

## 2025-01-03 PROCEDURE — 6370000000 HC RX 637 (ALT 250 FOR IP): Performed by: OBSTETRICS & GYNECOLOGY

## 2025-01-03 PROCEDURE — APPNB30 APP NON BILLABLE TIME 0-30 MINS: Performed by: ADVANCED PRACTICE MIDWIFE

## 2025-01-03 PROCEDURE — 97535 SELF CARE MNGMENT TRAINING: CPT

## 2025-01-03 RX ORDER — SODIUM CHLORIDE 9 MG/ML
INJECTION, SOLUTION INTRAVENOUS PRN
Status: DISCONTINUED | OUTPATIENT
Start: 2025-01-03 | End: 2025-01-05 | Stop reason: HOSPADM

## 2025-01-03 RX ADMIN — ACETAMINOPHEN 1000 MG: 500 TABLET ORAL at 07:21

## 2025-01-03 RX ADMIN — OXYCODONE HYDROCHLORIDE 10 MG: 5 TABLET ORAL at 21:45

## 2025-01-03 RX ADMIN — OXYCODONE HYDROCHLORIDE 10 MG: 5 TABLET ORAL at 12:01

## 2025-01-03 RX ADMIN — IBUPROFEN 800 MG: 400 TABLET, FILM COATED ORAL at 06:05

## 2025-01-03 RX ADMIN — IBUPROFEN 800 MG: 400 TABLET, FILM COATED ORAL at 14:54

## 2025-01-03 RX ADMIN — ACETAMINOPHEN 1000 MG: 500 TABLET ORAL at 14:54

## 2025-01-03 RX ADMIN — OXYCODONE HYDROCHLORIDE 5 MG: 5 TABLET ORAL at 04:02

## 2025-01-03 RX ADMIN — OXYCODONE HYDROCHLORIDE 10 MG: 5 TABLET ORAL at 16:12

## 2025-01-03 ASSESSMENT — PAIN SCALES - GENERAL
PAINLEVEL_OUTOF10: 8
PAINLEVEL_OUTOF10: 7
PAINLEVEL_OUTOF10: 4
PAINLEVEL_OUTOF10: 7
PAINLEVEL_OUTOF10: 8
PAINLEVEL_OUTOF10: 6
PAINLEVEL_OUTOF10: 6

## 2025-01-03 ASSESSMENT — PAIN DESCRIPTION - LOCATION
LOCATION: INCISION
LOCATION: ABDOMEN;INCISION
LOCATION: INCISION
LOCATION: ABDOMEN;INCISION
LOCATION: INCISION
LOCATION: ABDOMEN;INCISION
LOCATION: ABDOMEN;INCISION

## 2025-01-03 ASSESSMENT — PAIN DESCRIPTION - ORIENTATION
ORIENTATION: LOWER
ORIENTATION: ANTERIOR
ORIENTATION: LOWER

## 2025-01-03 ASSESSMENT — PAIN - FUNCTIONAL ASSESSMENT
PAIN_FUNCTIONAL_ASSESSMENT: ACTIVITIES ARE NOT PREVENTED
PAIN_FUNCTIONAL_ASSESSMENT: PREVENTS OR INTERFERES SOME ACTIVE ACTIVITIES AND ADLS

## 2025-01-03 ASSESSMENT — PAIN DESCRIPTION - DESCRIPTORS
DESCRIPTORS: ACHING;SHOOTING
DESCRIPTORS: SORE

## 2025-01-04 LAB
ABO + RH BLD: NORMAL
ANTIGENS PRESENT RBC DONR: NORMAL
BLD PROD TYP BPU: NORMAL
BLOOD BANK BLOOD PRODUCT EXPIRATION DATE: NORMAL
BLOOD BANK BLOOD PRODUCT EXPIRATION DATE: NORMAL
BLOOD BANK CMNT PATIENT-IMP: NORMAL
BLOOD BANK DISPENSE STATUS: NORMAL
BLOOD BANK ISBT PRODUCT BLOOD TYPE: 5100
BLOOD BANK ISBT PRODUCT BLOOD TYPE: 5100
BLOOD BANK PRODUCT CODE: NORMAL
BLOOD BANK PRODUCT CODE: NORMAL
BLOOD BANK UNIT TYPE AND RH: NORMAL
BLOOD BANK UNIT TYPE AND RH: NORMAL
BLOOD GROUP ANTIBODIES SERPL: NORMAL
BLOOD GROUP ANTIBODIES SERPL: NORMAL
BPU ID: NORMAL
CROSSMATCH RESULT: NORMAL
ERYTHROCYTE [DISTWIDTH] IN BLOOD BY AUTOMATED COUNT: 17.6 % (ref 11.5–14.5)
HCT VFR BLD AUTO: 25.3 % (ref 35–47)
HGB BLD-MCNC: 8.1 G/DL (ref 11.5–16)
MCH RBC QN AUTO: 24.5 PG (ref 26–34)
MCHC RBC AUTO-ENTMCNC: 32 G/DL (ref 30–36.5)
MCV RBC AUTO: 76.7 FL (ref 80–99)
NRBC # BLD: 0 K/UL (ref 0–0.01)
NRBC BLD-RTO: 0 PER 100 WBC
PLATELET # BLD AUTO: 141 K/UL (ref 150–400)
PMV BLD AUTO: 10.7 FL (ref 8.9–12.9)
RBC # BLD AUTO: 3.3 M/UL (ref 3.8–5.2)
SPECIMEN EXP DATE BLD: NORMAL
UNIT DIVISION: 0
UNIT ISSUE DATE/TIME: NORMAL
UNIT ISSUE DATE/TIME: NORMAL
WBC # BLD AUTO: 5 K/UL (ref 3.6–11)

## 2025-01-04 PROCEDURE — 97116 GAIT TRAINING THERAPY: CPT

## 2025-01-04 PROCEDURE — 6370000000 HC RX 637 (ALT 250 FOR IP)

## 2025-01-04 PROCEDURE — 1120000000 HC RM PRIVATE OB

## 2025-01-04 PROCEDURE — 6370000000 HC RX 637 (ALT 250 FOR IP): Performed by: OBSTETRICS & GYNECOLOGY

## 2025-01-04 PROCEDURE — 85027 COMPLETE CBC AUTOMATED: CPT

## 2025-01-04 PROCEDURE — 97530 THERAPEUTIC ACTIVITIES: CPT

## 2025-01-04 PROCEDURE — 36415 COLL VENOUS BLD VENIPUNCTURE: CPT

## 2025-01-04 PROCEDURE — 2500000003 HC RX 250 WO HCPCS: Performed by: OBSTETRICS & GYNECOLOGY

## 2025-01-04 PROCEDURE — APPNB30 APP NON BILLABLE TIME 0-30 MINS

## 2025-01-04 RX ORDER — IBUPROFEN 800 MG/1
800 TABLET, FILM COATED ORAL EVERY 8 HOURS
Qty: 120 TABLET | Refills: 3 | Status: SHIPPED | OUTPATIENT
Start: 2025-01-04

## 2025-01-04 RX ORDER — POLYETHYLENE GLYCOL 3350 17 G/17G
17 POWDER, FOR SOLUTION ORAL DAILY PRN
Qty: 30 PACKET | Refills: 0 | Status: SHIPPED | OUTPATIENT
Start: 2025-01-04 | End: 2025-02-03

## 2025-01-04 RX ORDER — FERROUS SULFATE 325(65) MG
325 TABLET ORAL 2 TIMES DAILY WITH MEALS
Status: DISCONTINUED | OUTPATIENT
Start: 2025-01-04 | End: 2025-01-05 | Stop reason: HOSPADM

## 2025-01-04 RX ORDER — PSEUDOEPHEDRINE HCL 30 MG
100 TABLET ORAL 2 TIMES DAILY PRN
Qty: 30 CAPSULE | Refills: 0 | Status: SHIPPED | OUTPATIENT
Start: 2025-01-04

## 2025-01-04 RX ORDER — OXYCODONE HYDROCHLORIDE 5 MG/1
5 TABLET ORAL EVERY 4 HOURS PRN
Qty: 5 TABLET | Refills: 0 | Status: SHIPPED | OUTPATIENT
Start: 2025-01-04 | End: 2025-01-05

## 2025-01-04 RX ADMIN — ACETAMINOPHEN 1000 MG: 500 TABLET ORAL at 02:02

## 2025-01-04 RX ADMIN — SODIUM CHLORIDE, PRESERVATIVE FREE 10 ML: 5 INJECTION INTRAVENOUS at 16:33

## 2025-01-04 RX ADMIN — DOCUSATE SODIUM 100 MG: 100 CAPSULE, LIQUID FILLED ORAL at 00:30

## 2025-01-04 RX ADMIN — DOCUSATE SODIUM 100 MG: 100 CAPSULE, LIQUID FILLED ORAL at 17:55

## 2025-01-04 RX ADMIN — ACETAMINOPHEN 1000 MG: 500 TABLET ORAL at 16:30

## 2025-01-04 RX ADMIN — POLYETHYLENE GLYCOL 3350 17 G: 17 POWDER, FOR SOLUTION ORAL at 00:30

## 2025-01-04 RX ADMIN — IBUPROFEN 800 MG: 400 TABLET, FILM COATED ORAL at 02:03

## 2025-01-04 RX ADMIN — IBUPROFEN 800 MG: 400 TABLET, FILM COATED ORAL at 16:32

## 2025-01-04 RX ADMIN — OXYCODONE HYDROCHLORIDE 5 MG: 5 TABLET ORAL at 21:42

## 2025-01-04 RX ADMIN — FERROUS SULFATE TAB 325 MG (65 MG ELEMENTAL FE) 325 MG: 325 (65 FE) TAB at 16:33

## 2025-01-04 RX ADMIN — OXYCODONE HYDROCHLORIDE 5 MG: 5 TABLET ORAL at 16:32

## 2025-01-04 ASSESSMENT — PAIN DESCRIPTION - DESCRIPTORS
DESCRIPTORS: ACHING;SORE
DESCRIPTORS: CRAMPING
DESCRIPTORS: SORE;ACHING

## 2025-01-04 ASSESSMENT — PAIN DESCRIPTION - ORIENTATION
ORIENTATION: LOWER
ORIENTATION: LOWER;MID

## 2025-01-04 ASSESSMENT — PAIN SCALES - GENERAL
PAINLEVEL_OUTOF10: 6
PAINLEVEL_OUTOF10: 5
PAINLEVEL_OUTOF10: 5

## 2025-01-04 ASSESSMENT — PAIN DESCRIPTION - LOCATION
LOCATION: ABDOMEN;INCISION
LOCATION: ABDOMEN;INCISION
LOCATION: INCISION;ABDOMEN

## 2025-01-04 ASSESSMENT — PAIN - FUNCTIONAL ASSESSMENT
PAIN_FUNCTIONAL_ASSESSMENT: ACTIVITIES ARE NOT PREVENTED

## 2025-01-04 NOTE — CONSULTS
Session ID: [id number]  Language: [place]   ID: #[id number]   Name: Flowerge: Eva   ID: #[id number]   Name: Johnny

## 2025-01-04 NOTE — DISCHARGE SUMMARY
Obstetrical Discharge Summary     Name: Shimon Phipps MRN: [id number]  SSN: [id number]    YOB: 1995  Age: 29 y.o.  Sex: female      Admit Date: 2024    Discharge Date: 2025     Admitting Physician: Sukhi Gomez MD     Attending Physician:  Bernie Gonzalez MD     Admission Diagnoses: 41 weeks gestation of pregnancy [O48.0, Z3A.41]    Discharge Diagnoses:   Information for the patient's :  Phoenix Phipps [[id number]]   @[id number]@     Additional Diagnoses:  No components found for: \"OBEXTABORH\", \"OBEXTABSCRN\", \"OBEXTRUBELLA\", \"OBEXTGRBS\"    Hospital Course: Postpartum course was complicated by acute blood loss anemia, which added 0 days to the patient's length of stay.      Disposition at Discharge: Home or self care    Discharged Condition: Stable    Patient Instructions:   Current Discharge Medication List        START taking these medications    Details   oxyCODONE (ROXICODONE) 5 MG immediate release tablet Take 1 tablet by mouth every 4 hours as needed for Pain for up to 3 days. Max Daily Amount: 30 mg  Qty: 5 tablet, Refills: 0    Comments: Reduce doses taken as pain becomes manageable  Associated Diagnoses: Delivery of pregnancy by  section      ibuprofen (ADVIL;MOTRIN) 800 MG tablet Take 1 tablet by mouth in the morning and 1 tablet at noon and 1 tablet in the evening.  Qty: 120 tablet, Refills: 3      docusate sodium (COLACE, DULCOLAX) 100 MG CAPS Take 100 mg by mouth 2 times daily as needed for Constipation  Qty: 30 capsule, Refills: 0      polyethylene glycol (GLYCOLAX) 17 g packet Take 1 packet by mouth daily as needed for Constipation  Qty: 30 packet, Refills: 0           CONTINUE these medications which have NOT CHANGED    Details   Prenatal Vit w/Up-Rgsqnoqyq-DG (PNV PO) Take 1 tablet by mouth daily           STOP taking these medications       aspirin 81 MG EC tablet Comments:   Reason for Stopping:               Reference my discharge

## 2025-01-05 VITALS
DIASTOLIC BLOOD PRESSURE: 79 MMHG | RESPIRATION RATE: 16 BRPM | TEMPERATURE: 99.7 F | SYSTOLIC BLOOD PRESSURE: 137 MMHG | BODY MASS INDEX: 34.45 KG/M2 | WEIGHT: 207 LBS | OXYGEN SATURATION: 99 % | HEART RATE: 87 BPM

## 2025-01-05 PROCEDURE — APPNB45 APP NON BILLABLE 31-45 MINUTES

## 2025-01-05 PROCEDURE — 6370000000 HC RX 637 (ALT 250 FOR IP): Performed by: OBSTETRICS & GYNECOLOGY

## 2025-01-05 PROCEDURE — 97530 THERAPEUTIC ACTIVITIES: CPT

## 2025-01-05 PROCEDURE — 97116 GAIT TRAINING THERAPY: CPT

## 2025-01-05 PROCEDURE — 6370000000 HC RX 637 (ALT 250 FOR IP)

## 2025-01-05 PROCEDURE — 97535 SELF CARE MNGMENT TRAINING: CPT

## 2025-01-05 RX ORDER — OXYCODONE HYDROCHLORIDE 5 MG/1
5 TABLET ORAL EVERY 4 HOURS PRN
Qty: 5 TABLET | Refills: 0 | Status: SHIPPED | OUTPATIENT
Start: 2025-01-05 | End: 2025-01-08

## 2025-01-05 RX ADMIN — FERROUS SULFATE TAB 325 MG (65 MG ELEMENTAL FE) 325 MG: 325 (65 FE) TAB at 10:50

## 2025-01-05 RX ADMIN — ACETAMINOPHEN 1000 MG: 500 TABLET ORAL at 10:49

## 2025-01-05 RX ADMIN — IBUPROFEN 800 MG: 400 TABLET, FILM COATED ORAL at 10:48

## 2025-01-05 RX ADMIN — DOCUSATE SODIUM 100 MG: 100 CAPSULE, LIQUID FILLED ORAL at 10:49

## 2025-01-05 RX ADMIN — OXYCODONE HYDROCHLORIDE 5 MG: 5 TABLET ORAL at 05:40

## 2025-01-05 RX ADMIN — IBUPROFEN 800 MG: 400 TABLET, FILM COATED ORAL at 00:48

## 2025-01-05 RX ADMIN — OXYCODONE HYDROCHLORIDE 5 MG: 5 TABLET ORAL at 10:49

## 2025-01-05 RX ADMIN — ACETAMINOPHEN 1000 MG: 500 TABLET ORAL at 00:48

## 2025-01-05 ASSESSMENT — PAIN SCALES - GENERAL
PAINLEVEL_OUTOF10: 5
PAINLEVEL_OUTOF10: 7
PAINLEVEL_OUTOF10: 6

## 2025-01-05 ASSESSMENT — PAIN DESCRIPTION - LOCATION
LOCATION: INCISION
LOCATION: ABDOMEN;INCISION

## 2025-01-05 ASSESSMENT — PAIN DESCRIPTION - ORIENTATION
ORIENTATION: LOWER;MID
ORIENTATION: LOWER;MID

## 2025-01-05 ASSESSMENT — PAIN - FUNCTIONAL ASSESSMENT
PAIN_FUNCTIONAL_ASSESSMENT: ACTIVITIES ARE NOT PREVENTED
PAIN_FUNCTIONAL_ASSESSMENT: ACTIVITIES ARE NOT PREVENTED

## 2025-01-05 ASSESSMENT — PAIN DESCRIPTION - DESCRIPTORS
DESCRIPTORS: SORE;ACHING
DESCRIPTORS: SORE

## 2025-01-05 NOTE — DISCHARGE SUMMARY
Obstetrical Discharge Summary     Name: Shimon Phipps MRN: [id number]  SSN: [id number]    YOB: 1995  Age: 29 y.o.  Sex: female      Admit Date: 2024    Discharge Date: 2025     Admitting Physician: Sukhi Gomez MD     Attending Physician:  Bernie Gonzalez MD     Admission Diagnoses: 41 weeks gestation of pregnancy [O48.0, Z3A.41]    Discharge Diagnoses:   Information for the patient's :  Phoenix Phipps [[id number]]   @[id number]@     Additional Diagnoses:  No components found for: \"OBEXTABORH\", \"OBEXTABSCRN\", \"OBEXTRUBELLA\", \"OBEXTGRBS\"    Hospital Course: Normal hospital course following the delivery.    Disposition at Discharge: Home or self care    Discharged Condition: Stable    Patient Instructions:   Current Discharge Medication List        START taking these medications    Details   oxyCODONE (ROXICODONE) 5 MG immediate release tablet Take 1 tablet by mouth every 4 hours as needed for Pain for up to 3 days. Max Daily Amount: 30 mg  Qty: 5 tablet, Refills: 0    Comments: Reduce doses taken as pain becomes manageable  Associated Diagnoses: Delivery of pregnancy by  section      ibuprofen (ADVIL;MOTRIN) 800 MG tablet Take 1 tablet by mouth in the morning and 1 tablet at noon and 1 tablet in the evening.  Qty: 120 tablet, Refills: 3      docusate sodium (COLACE, DULCOLAX) 100 MG CAPS Take 100 mg by mouth 2 times daily as needed for Constipation  Qty: 30 capsule, Refills: 0      polyethylene glycol (GLYCOLAX) 17 g packet Take 1 packet by mouth daily as needed for Constipation  Qty: 30 packet, Refills: 0           CONTINUE these medications which have NOT CHANGED    Details   Prenatal Vit w/Wl-Unqumcguv-TZ (PNV PO) Take 1 tablet by mouth daily           STOP taking these medications       aspirin 81 MG EC tablet Comments:   Reason for Stopping:               Reference my discharge instructions.  PT/OT to see pt prior to discharge.       Signed By:  Albin FIERRO

## 2025-01-05 NOTE — CARE COORDINATION
Care Management - Received call from nursing regarding order for rolling walker for patient.     Met with patient in her room. CNM was in room on  line with patient. Spoke with patient using same phone call.     Patient has not had a walker in past. She gave permission for CM to order the walker.     11:20 AM Order placed in [place]. MD signed order. Will deliver to patient.     DAVID STEWART    11:40 Addendum: Delivered walker.   DAVID STEWART

## 2025-01-05 NOTE — PLAN OF CARE
Problem: Occupational Therapy - Adult  Goal: By Discharge: Performs self-care activities at highest level of function for planned discharge setting.  See evaluation for individualized goals.  Description: FUNCTIONAL STATUS PRIOR TO ADMISSION:  Patient was ambulatory without use of DME.     HOME SUPPORT: Patient lived with her , mother-in-law, and six children. She was independent and active, working for [employer].    Occupational Therapy Goals:  Initiated 1/3/2025  1.  Patient will perform standing grooming routine with Supervision within 7 day(s).  2.  Patient will perform upper and lower body bathing, using AE PRN, with Supervision within 7 day(s).  3.  Patient will perform upper and lower body dressing, using AE PRN, with Supervision within 7 day(s).  4.  Patient will perform toilet transfers with Supervision  within 7 day(s).  5.  Patient will perform all aspects of toileting with Supervision within 7 day(s).  6.  Patient will utilize energy conservation techniques during functional activities with verbal cues within 7 day(s).    Outcome: Progressing     OCCUPATIONAL THERAPY TREATMENT    Patient: Shimon Phipps (29 y.o. female)  Date: 2025  Primary Diagnosis: 41 weeks gestation of pregnancy [O48.0, Z3A.41]  Procedure(s) (LRB):   SECTION (N/A) 4 Days Post-Op   Precautions:  (log roll technique due to abdominal surgery)                Chart, occupational therapy assessment, plan of care, and goals were reviewed.    ASSESSMENT  Patient continues to benefit from skilled OT services and is progressing towards goals. Pt's performance of ADL/IADL tasks continues to be limited by impaired standing balance, activity tolerance, and expected post-operative pain. Pt received long-sitting in bed and agreeable to participation in therapy session. She required verbal cues and re-education on use of log roll technique to complete bed mobility and min A to successfully transfer to EOB d/t 
  Problem: Physical Therapy - Adult  Goal: By Discharge: Performs mobility at highest level of function for planned discharge setting.  See evaluation for individualized goals.  Description: FUNCTIONAL STATUS PRIOR TO ADMISSION: Patient was independent and active without use of DME[place]    HOME SUPPORT PRIOR TO ADMISSION: The patient lived with her , her mother in law and her 7 children.    Physical Therapy Goals  Initiated 1/3/2025  1.  Patient will move from supine to sit and sit to supine, scoot up and down, and roll side to side in bed with independence within 7 day(s).    2.  Patient will perform sit to stand with independence within 7 day(s).  3.  Patient will transfer from bed to chair and chair to bed with independence using the least restrictive device within 7 day(s).  4.  Patient will ambulate with independence for 150 feet with the least restrictive device within 7 day(s).   5.  Patient will ascend/descend 12 stairs with one handrail(s) with independence within 7 day(s).   Outcome: Progressing     PHYSICAL THERAPY EVALUATION    Patient: Shimon Phipps (29 y.o. female)  Date: 1/3/2025  Primary Diagnosis: 41 weeks gestation of pregnancy [O48.0, Z3A.41]  Procedure(s) (LRB):   SECTION (N/A) 2 Days Post-Op   Precautions: Restrictions/Precautions:  (log roll technique due to abdominal surgery)                      ASSESSMENT :   DEFICITS/IMPAIRMENTS:   The patient is limited by decreased functional mobility, independence in ADLs, high-level IADLs, ROM, strength, body mechanics, activity tolerance, balance, increased pain levels. This is a 29 year old female who is POD2 s/p emergent  section. Received sitting on EOB with high levels of pain, utilized  for session. Patient with intermittent dizziness however vitals are stable. Required several seated rest breaks. Overall lethargy observed. Of note, Hgb 7.3 and awaiting blood transfusion. Overall she requires 
  Problem: Physical Therapy - Adult  Goal: By Discharge: Performs mobility at highest level of function for planned discharge setting.  See evaluation for individualized goals.  Description: FUNCTIONAL STATUS PRIOR TO ADMISSION: Patient was independent and active without use of DME[place]    HOME SUPPORT PRIOR TO ADMISSION: The patient lived with her , her mother in law and her 7 children.    Physical Therapy Goals  Initiated 1/3/2025  1.  Patient will move from supine to sit and sit to supine, scoot up and down, and roll side to side in bed with independence within 7 day(s).    2.  Patient will perform sit to stand with independence within 7 day(s).  3.  Patient will transfer from bed to chair and chair to bed with independence using the least restrictive device within 7 day(s).  4.  Patient will ambulate with independence for 150 feet with the least restrictive device within 7 day(s).   5.  Patient will ascend/descend 12 stairs with one handrail(s) with independence within 7 day(s).   Outcome: Progressing     PHYSICAL THERAPY TREATMENT    Patient: Shimon Phipps (29 y.o. female)  Date: 2025  Diagnosis: 41 weeks gestation of pregnancy [O48.0, Z3A.41] 41 weeks gestation of pregnancy  Procedure(s) (LRB):   SECTION (N/A) 3 Days Post-Op  Precautions:  (log roll technique due to abdominal surgery)                      ASSESSMENT:  Patient continues to benefit from skilled PT services and is slowly progressing towards goals. Pt received in bed agreeable to treatment. Utilized  for communication however  connection was intermittent and spotty therefore had to demonstrate a lot of movements. Patient requiring min A for bed mobility via log  roll technique. Assisted to properly alejandra abdominal binder for increased support. Ambulated to bathroom with RW, able to doff clothing/underwear while sitting on toilet and change sanitary pad independently, required min A to 
education regarding recommendation for use of RW for mobility for extra support due to pain levels as well as for safety. She was also educated on recommendation to utilize first floor setup at home and activity pacing/energy conservation. She expressed understanding of all education this date. Acute PT will continue to follow to maximize functional independence, however patient is cleared from a PT perspective for discharge given current functional level and support available at home.      Session ID: [id number]  Language: Swahili   ID: #[id number]   Name: Julian       PLAN:  Patient continues to benefit from skilled intervention to address the above impairments.  Continue treatment per established plan of care.    Recommendations for staff mobility and toileting assistance:  Recommend that staff completes patient mobility with assist x1 using gait belt and rolling walker. and Recommend toileting using  recommended toilet device: the bathroom.      Recommendation for discharge: (in order for the patient to meet his/her long term goals):   Intermittent physical therapy up to 2-3x/week in previous living setting with additional support needed for bed mobility    Other factors to consider for discharge:  pain levels; good support system at home    IF patient discharges home will need the following DME:  RW has been delivered for discharge       SUBJECTIVE:   Patient stated, \"Thank you for your help.\"    OBJECTIVE DATA SUMMARY:   Critical Behavior:  Orientation  Overall Orientation Status: Within Normal Limits  Orientation Level: Oriented X4  Cognition  Overall Cognitive Status: WNL    Functional Mobility Training:  Bed Mobility:  Bed Mobility Training  Bed Mobility Training: Yes  Interventions: Safety awareness training;Tactile cues;Verbal cues  Supine to Sit: Minimum assistance;Additional time  Scooting: Contact-guard assistance;Additional time  Transfers:  Transfer Training  Transfer Training: 
Normal  Sensation: Intact          Functional Mobility and Transfers for ADLs:    Bed Mobility:     Bed Mobility Training  Bed Mobility Training: Yes  Sit to Supine: Minimum assistance;Assist X2;Additional time  Scooting: Contact-guard assistance    Transfers:      Transfer Training  Transfer Training: Yes  Sit to Stand: Minimum assistance;Assist X2  Stand to Sit: Minimum assistance;Assist X2  Toilet Transfer: Minimum assistance;Assist X2                     Balance:      Balance  Sitting: Intact  Standing: Impaired  Standing - Static: Good;Fair (improved to good w/ support of RW)  Standing - Dynamic: Fair (improved to good w/ wupport of RW)      ADL Assessment:          Feeding: Independent       Grooming: Contact guard assistance       UE Bathing: Minimal assistance            LE Bathing: Maximum assistance       UE Dressing: Minimal assistance       LE Dressing: Maximum assistance       Toileting: Maximum assistance                         ADL Intervention and task modifications:      [facility] \"6 Clicks\"                                                       Daily Activity Inpatient Short Form  AM-PAC Daily Activity - Inpatient   How much help is needed for putting on and taking off regular lower body clothing?: A Lot  How much help is needed for bathing (which includes washing, rinsing, drying)?: A Lot  How much help is needed for toileting (which includes using toilet, bedpan, or urinal)?: A Lot  How much help is needed for putting on and taking off regular upper body clothing?: A Little  How much help is needed for taking care of personal grooming?: A Little  How much help for eating meals?: None  [facility] Inpatient Daily Activity Raw Score: 16  AM-PAC Inpatient ADL T-Scale Score : 35.96  ADL Inpatient CMS 0-100% Score: 53.32  ADL Inpatient CMS G-Code Modifier : CK     Interpretation of Tool:  Represents clinically-significant functional categories (i.e. Activities of daily living).  Cutoff

## 2025-01-05 NOTE — PROGRESS NOTES
0900 pt up and walked with 2 nurses to bathroom   pt has a sprain on her right knee and was able to walk to bathroom  pt stated she had been walking on her knee    Dr Gonzalez ordered pt evaluation and stated to take out PIVs  1005 pt called out stated she felt dizzy and lightheaded  VS taken   on language line talking with   no other symptoms  Called placed for Dr Gonzalez  1020  pt stated she felt better and talking with her family on phone   1025  Dr Gonzalez notified about dizzy spell   CBC ordered for am   will continue to monitor  1050  pt stated she feels much better and talking on her phone   will continue to monitor  1248  pt refused to use bedside commode  pt walked to bathroom with 2 nurse assist   pt had some difficulty walking on right knee per nurse  pt voided large amount  washed hands and walked back to bed  pt stated she was dizzy/lightheaded   pt SOB  VS taken   CBC obtained and sent to lab     will continue to monitor  1400 pt up and walked with one nurse to bathroom   pt seems to be walking better   says no to dizziness /lightheadedness     1600  pt up and walked to bathroom with stand by assist     
1930: Patient stood to bedside commode with RN assist x2. Large void at this time, a large clot noted. Patient returned to bed after hermann care. Fundal assessment is firm, midline, at U, scant bleeding. Patient tolerated fairly well. Pain 10/10 with movement per patient.     2015: During assessment, patient on phone with family.  used at this time. Patient denies questions or concerns. Discussed plan to discontinue PCA around 0313. Patient agrees and indicates understanding.     2330: Patient ambulated to bedside commode at this time with RN assist x2. She is very tearful with slow movements. Patient able to void a large amount. Small bleeding noted. Pad changed and hermann care performed. Patient returned to bed and ice pack was placed on her incision. Patient describes pain a 10/10 with movement despite pressing PCA button prior to movement. Per patient, when sitting, pain is 4-6/10, but with movement it is a 10/10. Infant taken to nursery per mother's request due to her pain and inability to ambulate.    2345: Call to CHRIS Romeo CNM regarding patient's pain management. Ok to give dose of 0.25 mg of Dilaudid IV for breakthrough pain.     0002: Dose of 0.25mg of Dilaudid IV given for breakthrough pain. Patient still tearful in bed due to pain. Ice pack replaced.    0035: Patient in bed resting with eyes closed. Respiratory rate 14.    0330: This RN woke patient to ambulate to bedside commode with RN assist x2. Patient again very tearful and states that she is in pain. She describes the pain as sharp pain that is in the incision area only. She states it is constant, however is unbearable with movement. Movements are slow and unsteady. Patient able to void. Fundal assessment is firm, midline, at U with small bleeding. Patient assisted back to bed. Patient still very tearful. She states that her pain is 10/10 with movement despite pressing her PCA button prior to movement. Ice pack replaced on incision. Will 
CBC drawn and sent to lab  
Labor Progress Note  Patient seen, fetal heart rate and contraction pattern evaluated, patient examined.  /76   Pulse 85   Temp 98.4 °F (36.9 °C) (Oral)   Resp 18   Wt 93.9 kg (207 lb)   LMP  (LMP Unknown)   BMI 34.45 kg/m²     Physical Exam:  Cervical Exam:  9 cm dilated    100% effaced    -2 station    Presenting Part: cephalic  Membranes:  Intact  Uterine Activity: Frequency: Every 3 minutes, Duration: 60 seconds, and Intensity: strong  Fetal Heart Rate: Baseline: 140 per minute  Variability: moderate  Accelerations: no  Decelerations: variable and early    Assessment/Plan:  AROM performed for large amount of meconium fluid.    Anticipate              
Labor Progress Note  Patient seen, fetal heart rate and contraction pattern evaluated, patient examined.  BP (!) 155/72   Pulse 75   Temp 98.4 °F (36.9 °C) (Oral)   Resp 18   Wt 93.9 kg (207 lb)   LMP  (LMP Unknown)   BMI 34.45 kg/m²     Physical Exam:  Cervical Exam:  9 cm dilated    100% effaced    -2 station    Presenting Part: cephalic  Membranes:  Artificial Rupture of Membranes; Amniotic Fluid: large amount of thin meconium fluid  Uterine Activity: Frequency: Every 1-4 minutes, Duration: 60 seconds, and Intensity: strong  Fetal Heart Rate: Baseline: 120 per minute  Variability: moderate  Accelerations: no  Decelerations: prolonged    Assessment/Plan:     @ 41.1  Hx of term fetal demise, with placental abruption  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 Johnson called to bedside due to prolonged decelerations into 80s.  Recommended delivery by  section.  Care transferred.                       
OBHospitalist Labor Progress Note:  CTSP by CNM due to Failure to Progress Beyond 9 CM and concern for Bloody Show with h/o Previous abruption and fetal demise  Patient seen, fetal heart rate and contraction pattern evaluated.     Physical Exam:  Cervical Exam: 9cm  Membranes:  ruptured with large amount of bloody show  Uterine Activity: Frequency: regular  Fetal Heart Rate: 110 with persistent prolonged decelerations  Accelerations: no  Decelerations: variable  Uterine contractions: irregular    Assessment/Plan:  Reassuring fetal status. Will continue to observe overnight for labor.    D/C antibiotics after 2nd dose of IV antibiotics.    Sukhi Gomez MD          
Physical Therapy 1/3/25    Orders received, chart reviewed and patient evaluated by physical therapy. Pending progression with skilled acute physical therapy, recommend:    Outpatient physical therapy for post surgical rehab when medically cleared by MD    Recommend with nursing OOB to chair 3x/day and walking daily with one assist and rolling walker.   Please utilize log roll technique for all transfers in and out of bed.   Sky abdominal binder for support prior to mobility.    Thank you for completing as able in order to maintain patient strength, endurance and independence.     Full evaluation to follow.      Thank you for your consideration,    Brielle Oh, PT, DPT   
Post-Operative  Day 2    Shimon [facility]     Assessment:   Post-Op day 2  Desires early discharge home yes no: No   #[id number]    Hgb down from 10.5 on admission to 7.3 - pt complains of feeling dizzy and weak    Plan:   1. Routine post-operative care  2. Blood transfusion discussed with pt in detail with  pt in agreement, consent reviewed with  and pt signed.     Information for the patient's :  Mwenebatu, Boy Shimon [[id number]]   , Low Transverse     Patient doing well without significant complaint  Ambulating and voiding without difficulty   Nausea and vomiting resolved  Tolerating PO and PO meds well  Passing flatus  BM yes no: No  Breastfeeding well established      Vitals:  /83   Pulse 81   Temp 98.3 °F (36.8 °C) (Oral)   Resp 16   Wt 93.9 kg (207 lb)   LMP  (LMP Unknown)   SpO2 99%   Breastfeeding Unknown   BMI 34.45 kg/m²   Temp (24hrs), Av.1 °F (36.7 °C), Min:97.9 °F (36.6 °C), Max:98.3 °F (36.8 °C)        Exam:    A,A&Ox3      Patient without distress  Breasts soft, NT  Abdomen soft expected tenderness  FF scant Lochia Rubra  Wound incision clean, dry and intact  Lower extremities are negative for swelling, cords or tenderness.    Labs:   Lab Results   Component Value Date/Time    WBC 6.2 2025 06:10 AM    WBC 8.3 2025 12:43 PM    WBC 6.5 2025 03:40 AM    WBC 13.7 2025 03:33 AM    WBC 6.7 2024 08:28 PM    WBC 5.5 2024 10:01 AM    WBC 5.7 10/13/2023 07:06 AM    WBC 5.0 2023 09:02 AM    WBC 5.4 2023 06:32 PM    WBC 6.0 2023 08:58 PM    WBC 3.8 2022 09:53 AM    WBC 4.3 2022 01:30 AM    WBC 4.0 2022 12:11 PM    WBC 5.2 10/08/2021 11:19 AM    WBC 5.9 10/04/2021 01:01 AM    WBC 7.2 10/02/2021 07:24 PM    WBC 7.6 10/02/2021 05:29 AM    WBC 7.4 10/01/2021 05:31 PM    WBC 7.6 10/01/2021 09:12 AM    WBC 11.5 2021 10:06 PM    WBC 14.2 2021 11:49 AM    WBC 9.8 
Post-Operative  Day 3    Shimon [facility]     Assessment/Plan: Post-Op day 3  Pt continues to struggle to ambulate. Had injury to left knee early in December and was seen at hospital cleared and diagnosed with strain.   Saw PT today, recommending further support in hospital  She is unsteady and unable to walk without a walker.   She notes some dizziness, /64 pulse 84. Hgb 8.3.  She notes she feels very dehydrated which may be contributing to ligtheadedness.   Plan for PO iron, increasing PO fluids and snacking.   PT is ordering a walker and will meet with pt tomorrow to develop discharge plan.   STAT CBC  Only c/o dizziness, no other complaints besides incision and knee pain.   Working with case management to obtain supportive devices for discharge   RN team notes pt is appropriate, able to care for baby, just difficulty ambulating.   Plan for D/c tomorrow pending PT approval    Plan:   1. Routine post-operative care    Information for the patient's :  [facility] [[id number]]   , Low Transverse       Vitals:  /64   Pulse 84   Temp 98.2 °F (36.8 °C) (Oral)   Resp 14   Wt 93.9 kg (207 lb)   LMP  (LMP Unknown)   SpO2 98%   Breastfeeding Unknown   BMI 34.45 kg/m²   Temp (24hrs), Av.1 °F (36.7 °C), Min:98 °F (36.7 °C), Max:98.3 °F (36.8 °C)        Exam:    A,A&Ox3      Patient without distress  Breasts soft, NT  Abdomen soft expected tenderness  FF scant Lochia Rubra  Wound incision clean, dry and intact  Lower extremities are negative for swelling, cords or tenderness.    Labs:   Lab Results   Component Value Date/Time    WBC 6.0 2025 09:47 PM    WBC 6.2 2025 06:10 AM    WBC 8.3 2025 12:43 PM    WBC 6.5 2025 03:40 AM    WBC 13.7 2025 03:33 AM    WBC 6.7 2024 08:28 PM    WBC 5.5 2024 10:01 AM    WBC 5.7 10/13/2023 07:06 AM    WBC 5.0 2023 09:02 AM    WBC 5.4 2023 06:32 PM    WBC 6.0 2023 08:58 PM    
[facility]  Day 3    [facility]     Assessment: Post-Op day 3, doing well  Desires dicharge    Plan:   1. Routine post-operative care  2. PT consult    Information for the patient's :  Desire, [place] [[id number]]   , Low Transverse     Patient doing well without significant complaint  Nausea and vomiting resolved  Tolerating PO and PO meds well  Passing flatus  BM yes no: no  Breastfeeding well established      Vitals:  /71   Pulse 72   Temp 98 °F (36.7 °C) (Oral)   Resp 16   Wt 93.9 kg (207 lb)   LMP  (LMP Unknown)   SpO2 99%   Breastfeeding Unknown   BMI 34.45 kg/m²   Temp (24hrs), Av.2 °F (36.8 °C), Min:98 °F (36.7 °C), Max:98.4 °F (36.9 °C)        Exam:    A,A&Ox3      Patient without distress  Breasts soft, NT  Abdomen soft expected tenderness  FF scant Lochia Rubra  Wound incision clean, dry and intact  Lower extremities are negative for swelling, cords or tenderness.    Labs:   Lab Results   Component Value Date/Time    WBC 6.0 2025 09:47 PM    WBC 6.2 2025 06:10 AM    WBC 8.3 2025 12:43 PM    WBC 6.5 2025 03:40 AM    WBC 13.7 2025 03:33 AM    WBC 6.7 2024 08:28 PM    WBC 5.5 2024 10:01 AM    WBC 5.7 10/13/2023 07:06 AM    WBC 5.0 2023 09:02 AM    WBC 5.4 2023 06:32 PM    WBC 6.0 2023 08:58 PM    WBC 3.8 2022 09:53 AM    WBC 4.3 2022 01:30 AM    WBC 4.0 2022 12:11 PM    WBC 5.2 10/08/2021 11:19 AM    WBC 5.9 10/04/2021 01:01 AM    WBC 7.2 10/02/2021 07:24 PM    WBC 7.6 10/02/2021 05:29 AM    WBC 7.4 10/01/2021 05:31 PM    WBC 7.6 10/01/2021 09:12 AM    WBC 11.5 2021 10:06 PM    WBC 14.2 2021 11:49 AM    WBC 9.8 2021 08:24 AM    WBC 11.6 2021 04:28 AM    WBC 5.3 2021 10:00 AM    WBC 4.0 2021 11:50 AM    HGB 8.3 2025 09:47 PM    HGB 7.3 2025 06:10 AM    HGB 7.9 2025 12:43 PM    HGB 7.4 2025 03:40 AM    HGB 10.5 
Post-Operative  Day 4    Shimon [facility]     Assessment:   Post-Op day 4, doing well  Pt feeling much better.  Feels she is getting around better, able to care for baby.  PT supportive of pt going home with walker.   Case management at bedside organizing discharge.    Plan:   1. Routine post-operative care    Information for the patient's :  [facility], Boy Shimon [[id number]]   , Low Transverse     Patient doing well without significant complaint  Ambulating and voiding without difficulty   Nausea and vomiting resolved  Tolerating PO and PO meds well  Passing flatus  BM yes no: Yes  Breastfeeding well established      Vitals:  /67   Pulse 87   Temp 98.7 °F (37.1 °C) (Oral)   Resp 16   Wt 93.9 kg (207 lb)   LMP  (LMP Unknown)   SpO2 98%   Breastfeeding Unknown   BMI 34.45 kg/m²   Temp (24hrs), Av.5 °F (36.9 °C), Min:98.3 °F (36.8 °C), Max:98.7 °F (37.1 °C)        Exam:    A,A&Ox3      Patient without distress  Breasts soft, NT  Abdomen soft expected tenderness  FF scant Lochia Rubra  Wound incision clean, dry and intact  Lower extremities are negative for swelling, cords or tenderness.    Labs:   Lab Results   Component Value Date/Time    WBC 5.0 2025 04:52 PM    WBC 6.0 2025 09:47 PM    WBC 6.2 2025 06:10 AM    WBC 8.3 2025 12:43 PM    WBC 6.5 2025 03:40 AM    WBC 13.7 2025 03:33 AM    WBC 6.7 2024 08:28 PM    WBC 5.5 2024 10:01 AM    WBC 5.7 10/13/2023 07:06 AM    WBC 5.0 2023 09:02 AM    WBC 5.4 2023 06:32 PM    WBC 6.0 2023 08:58 PM    WBC 3.8 2022 09:53 AM    WBC 4.3 2022 01:30 AM    WBC 4.0 2022 12:11 PM    WBC 5.2 10/08/2021 11:19 AM    WBC 5.9 10/04/2021 01:01 AM    WBC 7.2 10/02/2021 07:24 PM    WBC 7.6 10/02/2021 05:29 AM    WBC 7.4 10/01/2021 05:31 PM    WBC 7.6 10/01/2021 09:12 AM    WBC 11.5 2021 10:06 PM    WBC 14.2 2021 11:49 AM    WBC 9.8 2021 08:24 AM    
Pt states she fell and injured her right knee at work at the beginning of December.  She was seen and evaluated at an emergency room.  She had been able to ambulate with some pain at home prior to delivery.  Since delivery, the knee pain combined with post surgical pain, weakness and dizziness have contributed to her difficulties with ambulation.  
The risks and benefits of the circumcision  procedure and anesthesia including: bleeding, infection, variability of cosmetic results were discussed at length with the mother. She is aware that future repeat procedures may be necessary. She gives informed consent to proceed as noted and her questions are answered.  Consent obtained and Lidocaine orders entered.    
To whom it may concern,         Shimon Phipps was admitted to the hospital on 12/31 for the delivery of an infant. She is being discharged home the evening of 1/5. Her  was present with her during this time providing support. For more information, please contact Rony MCDUFFIE at [facility]. This note is written per the patient and her 's request.     Albin Gomez, ANDI Shah CNM   
09/17/2024 10:01 AM    WBC 5.7 10/13/2023 07:06 AM    WBC 5.0 08/08/2023 09:02 AM    WBC 5.4 07/13/2023 06:32 PM    WBC 6.0 04/07/2023 08:58 PM    WBC 3.8 11/21/2022 09:53 AM    WBC 4.3 08/12/2022 01:30 AM    WBC 4.0 07/24/2022 12:11 PM    WBC 5.2 10/08/2021 11:19 AM    WBC 5.9 10/04/2021 01:01 AM    WBC 7.2 10/02/2021 07:24 PM    WBC 7.6 10/02/2021 05:29 AM    WBC 7.4 10/01/2021 05:31 PM    WBC 7.6 10/01/2021 09:12 AM    WBC 11.5 09/30/2021 10:06 PM    WBC 14.2 09/30/2021 11:49 AM    WBC 9.8 09/30/2021 08:24 AM    WBC 11.6 09/30/2021 04:28 AM    WBC 5.3 07/26/2021 10:00 AM    WBC 4.0 06/28/2021 11:50 AM    HGB 7.4 01/02/2025 03:40 AM    HGB 10.5 01/01/2025 03:33 AM    HGB 10.1 12/31/2024 08:28 PM    HGB 10.1 09/17/2024 10:01 AM    HGB 9.7 10/13/2023 07:06 AM    HGB 9.0 08/08/2023 09:02 AM    HGB 9.3 07/13/2023 06:32 PM    HGB 10.9 04/07/2023 08:58 PM    HGB 10.6 11/21/2022 09:53 AM    HGB 11.1 08/12/2022 01:30 AM    HGB 11.2 07/24/2022 12:11 PM    HGB 9.0 10/08/2021 11:19 AM    HGB 8.6 10/04/2021 01:01 AM    HGB 8.5 10/02/2021 07:24 PM    HGB 7.1 10/02/2021 05:29 AM    HGB 7.4 10/01/2021 05:31 PM    HGB 7.8 10/01/2021 09:12 AM    HGB 8.9 09/30/2021 10:06 PM    HGB 9.0 09/30/2021 11:49 AM    HGB 6.4 09/30/2021 08:24 AM    HGB 9.2 09/30/2021 04:28 AM    HGB 10.3 07/26/2021 10:00 AM    HGB 10.5 06/28/2021 11:50 AM    HCT 23.5 01/02/2025 03:40 AM    HCT 34.6 01/01/2025 03:33 AM    HCT 32.8 12/31/2024 08:28 PM    HCT 32.9 09/17/2024 10:01 AM    HCT 32.7 10/13/2023 07:06 AM    HCT 30.4 08/08/2023 09:02 AM    HCT 31.0 07/13/2023 06:32 PM    HCT 35.6 04/07/2023 08:58 PM    HCT 36.5 11/21/2022 09:53 AM    HCT 35.4 08/12/2022 01:30 AM    HCT 35.2 07/24/2022 12:11 PM    HCT 29.3 10/08/2021 11:19 AM    HCT 26.3 10/04/2021 01:01 AM    HCT 25.4 10/02/2021 07:24 PM    HCT 21.3 10/02/2021 05:29 AM    HCT 21.9 10/01/2021 05:31 PM    HCT 22.8 10/01/2021 09:12 AM    HCT 26.1 09/30/2021 10:06 PM    HCT 27.5 09/30/2021 11:49 AM    HCT 
Antibody Ident NO ADDITIONAL ANTIBODIES DETECTED     Blood Bank Comment Previously identified anti E and anti K     Unit Number [id number]     Product Code Blood Bank  LR     Unit Divison 00     Dispense Status Blood Bank ISSUED     Unit Issue Date/Time [id number]     Product Code Blood Bank M6861K21     Blood Bank Unit Type and Rh O POS     Blood Bank ISBT Product Blood Type 5100     Blood Bank Blood Product Expiration Date 321023874815     Antigen/Antibody E NEGATIVE,  K NEGATIVE,       Crossmatch Result Compatible     Unit Number [id number]     Product Code Blood Bank  LR     Unit Divison 00     Dispense Status Blood Bank ALLOCATED     Antigen/Antibody E NEGATIVE,  K NEGATIVE,       Crossmatch Result Compatible    PREPARE RBC (CROSSMATCH), 2 Units    Collection Time: 01/01/25  2:00 AM   Result Value Ref Range    History Check Historical check performed    CBC    Collection Time: 01/01/25  3:33 AM   Result Value Ref Range    WBC 13.7 (H) 3.6 - 11.0 K/uL    RBC 4.46 3.80 - 5.20 M/uL    Hemoglobin 10.5 (L) 11.5 - 16.0 g/dL    Hematocrit 34.6 (L) 35.0 - 47.0 %    MCV 77.6 (L) 80.0 - 99.0 FL    MCH 23.5 (L) 26.0 - 34.0 PG    MCHC 30.3 30.0 - 36.5 g/dL    RDW 17.0 (H) 11.5 - 14.5 %    Platelets 129 (L) 150 - 400 K/uL    Nucleated RBCs 0.0 0  WBC    nRBC 0.00 0.00 - 0.01 K/uL       No components found for: \"OBEXTABORH\", \"OBEXTABSCRN\", \"OBEXTRUBELLA\", \"OBEXTGRBS\", \"OBEXTHBSAG\", \"OBEXTHIV\", \"OBEXTRPR\", \"OBEXTGONORR\", \"OBEXTCHLAM\"    Assessment and Plan:  Postoperative day #0S/P C/S.  Doing well.   - Continue routine post-op care and maternal education.     - encourage ambulation   - advance diet as tolerated

## 2025-02-04 NOTE — PROGRESS NOTES
Shimon Phipps is a 29 y.o. female returns for a routine post-partum follow-up visit     Chief Complaint   Patient presents with    Postpartum Care       Postpartum Depression: Not on file (10/14/2023)           [place]  Depression Scale as of 25      Current EPDS Total Score: 0      Current EPDS Self Harm Result: Never     Type of delivery: primary  section  Date of Delivery: 25  Breastfeeding: yes  Bleeding Resolved: yes  Birth Control: None  Last Pap:  23,NILM        Problems: Patient has no complaints at this time.      1. Have you been to the ER, urgent care clinic, or hospitalized since your last visit? Yes- When: 24. Where: [facility].  Reason for visit: Delivery  2. Have you seen or consulted any other health care providers outside of the [facility] since your last visit? No      She declines  a chaperone during the gynecologic exam today.

## 2025-02-05 ENCOUNTER — ROUTINE PRENATAL (OUTPATIENT)
Age: 30
End: 2025-02-05

## 2025-02-05 VITALS
BODY MASS INDEX: 34.31 KG/M2 | TEMPERATURE: 98.2 F | SYSTOLIC BLOOD PRESSURE: 133 MMHG | RESPIRATION RATE: 16 BRPM | DIASTOLIC BLOOD PRESSURE: 82 MMHG | WEIGHT: 206.2 LBS | HEART RATE: 63 BPM | OXYGEN SATURATION: 98 %

## 2025-02-05 PROCEDURE — 0503F POSTPARTUM CARE VISIT: CPT | Performed by: OBSTETRICS & GYNECOLOGY

## 2025-02-05 NOTE — PROGRESS NOTES
Postpartum evaluation    Shimon Phipps is a 29 y.o. female who presents for a postpartum exam.     She is now six weeks post primary  section for nonreassuring fetal status and fetal intolerance of labor. She underwent general anesthesia and had a stat section performed by Dr. Gomez. She reports that she is healing well and pain is now well controlled.    Mom and baby Quique are doing well.     Concerns: None    Mood stable, denies anxiety/depression, EDS 0.    Incision/Perineum healing well.     Bleeding/lochia appropriate. Has not yet had menses.    The patient is breastfeeding without difficulty.     The patient does not want anything for menstrual control.     She is currently taking the following medications: none    Last pap: 2023- NILM      /82   Pulse 63   Temp 98.2 °F (36.8 °C) (Oral)   Resp 16   Wt 93.5 kg (206 lb 3.2 oz)   LMP  (LMP Unknown)   SpO2 98%   Breastfeeding Yes   BMI 34.31 kg/m²     PHYSICAL EXAMINATION    Constitutional  Appearance: well-nourished, well developed, alert, in no acute distress    HENT  Head and Face: appears normal    Gastrointestinal  Abdominal Examination: abdomen non-tender to palpation, normal bowel sounds, no masses present, incision well healed  Liver and spleen: no hepatomegaly present, spleen not palpable  Hernias: no hernias identified    Neurologic/Psychiatric  Mental Status:  Orientation: grossly oriented to person, place and time  Mood and Affect: mood normal, affect appropriate    Assessment:  Normal postpartum check    Plan:  Declines contraception. She reports she has not done well with contraception before. Aware of risks of early pregnancy given that she has now had a c/s  Next pap due in     RTC for AE or sooner jon Rodriguez MD  2025  10:55 AM

## 2025-02-18 ENCOUNTER — HOSPITAL ENCOUNTER (EMERGENCY)
Facility: HOSPITAL | Age: 30
Discharge: HOME | End: 2025-02-18
Attending: EMERGENCY MEDICINE
Payer: MEDICAID

## 2025-02-18 VITALS
SYSTOLIC BLOOD PRESSURE: 129 MMHG | BODY MASS INDEX: 34.82 KG/M2 | TEMPERATURE: 98 F | DIASTOLIC BLOOD PRESSURE: 76 MMHG | HEART RATE: 72 BPM | RESPIRATION RATE: 18 BRPM | OXYGEN SATURATION: 100 % | WEIGHT: 209.22 LBS

## 2025-02-18 DIAGNOSIS — H10.32 ACUTE CONJUNCTIVITIS OF LEFT EYE, UNSPECIFIED ACUTE CONJUNCTIVITIS TYPE: Primary | ICD-10-CM

## 2025-02-18 PROCEDURE — 99283 EMERGENCY DEPT VISIT LOW MDM: CPT

## 2025-02-18 PROCEDURE — 6370000000 HC RX 637 (ALT 250 FOR IP)

## 2025-02-18 RX ORDER — DIPHENHYDRAMINE HCL 25 MG
25 CAPSULE ORAL EVERY 6 HOURS PRN
Qty: 12 CAPSULE | Refills: 0 | Status: SHIPPED | OUTPATIENT
Start: 2025-02-18 | End: 2025-02-21

## 2025-02-18 RX ORDER — TETRACAINE HYDROCHLORIDE 5 MG/ML
1 SOLUTION OPHTHALMIC
Status: COMPLETED | OUTPATIENT
Start: 2025-02-18 | End: 2025-02-18

## 2025-02-18 RX ORDER — ERYTHROMYCIN 5 MG/G
OINTMENT OPHTHALMIC
Qty: 1 G | Refills: 0 | Status: SHIPPED | OUTPATIENT
Start: 2025-02-18 | End: 2025-02-28

## 2025-02-18 RX ADMIN — TETRACAINE HYDROCHLORIDE 1 DROP: 5 SOLUTION OPHTHALMIC at 09:21

## 2025-02-18 RX ADMIN — FLUORESCEIN SODIUM 1 MG: 1 STRIP OPHTHALMIC at 09:21

## 2025-02-18 ASSESSMENT — PAIN SCALES - GENERAL
PAINLEVEL_OUTOF10: 2
PAINLEVEL_OUTOF10: 8

## 2025-02-18 ASSESSMENT — PAIN DESCRIPTION - PAIN TYPE
TYPE: ACUTE PAIN
TYPE: ACUTE PAIN

## 2025-02-18 ASSESSMENT — PAIN DESCRIPTION - ORIENTATION
ORIENTATION: LEFT
ORIENTATION: LEFT

## 2025-02-18 ASSESSMENT — PAIN - FUNCTIONAL ASSESSMENT: PAIN_FUNCTIONAL_ASSESSMENT: 0-10

## 2025-02-18 ASSESSMENT — PAIN DESCRIPTION - LOCATION
LOCATION: EYE
LOCATION: EYE

## 2025-02-18 NOTE — ED NOTES
Patient left ED in no acute distress, alert and oriented x4. Patient was encouraged to come back if symptoms get worse. Patient was provided with discharge instructions and prescriptions. All questions were answered. Patient left ambulatory.    Patient does not have an IV.

## 2025-02-18 NOTE — ED PROVIDER NOTES
[facility] EMERGENCY DEPARTMENT  EMERGENCY DEPARTMENT ENCOUNTER      Date: 2025  Patient Name: Shimon Phipps  MRN: [id number]  Birthdate 1995  Date of evaluation: 2025  Provider: ANDI Pradhan NP   Note Started: 8:46 AM EST 25    CHIEF COMPLAINT     Chief Complaint   Patient presents with    Eye Pain       HISTORY OF PRESENT ILLNESS  (Onset, Location, Duration, Character, Alleviating/Aggravating, Radiation, Timing, Severity)   Note limiting factors.   History Provided By: Patient and     HPI: Shimon Phipps is a 29 y.o. female with a history of  section presents with left eye pain, redness, and drainage.  Onset yesterday.  Denies trauma or suspicion for foreign body.  Also endorses itching.  No OTC treatment PTA.  Denies fevers, nausea, vomiting, diarrhea, sore throat, cough, congestion, runny nose, loss of vision.      Nursing Notes and triage vitals were reviewed.  PCP: Priti Sheffield APRN - NP      PAST MEDICAL HISTORY   Past Medical History:  Past Medical History:   Diagnosis Date    Routine screening for STI (sexually transmitted infection) 2020    Health Dept labs:  Hep B testing with immunity to hepatitis B (reactive sAb, with negative sAg and total core Ab); HIV non-reactive; T pallidum/syphilis non-reactive; HCV negative.  GC/CT negative.    Screening for iron deficiency anemia 2020    Health Dept labs:  Hgb 11.9 with MCV 77.  Remainder of H18 normal.  Chem 8 normal except CO2 18.      Screening for tuberculosis 2020    Health Dept labs:  T-spot negative.       Past Surgical History:  Past Surgical History:   Procedure Laterality Date     SECTION N/A 2025     SECTION performed by Sukhi Gomez MD at [facility] L&D OR       Family History:  Family History   Problem Relation Age of Onset    No Known Problems Father     No Known Problems Mother        Social History:  Social History     Tobacco Use    Smoking

## 2025-02-18 NOTE — DISCHARGE INSTRUCTIONS
Thank you for allowing us to provide you with medical care today.  We realize that you have many choices for your emergency care needs.  We thank you for choosing [facility].  Please choose us in the future for any continued health care needs.     We hope we addressed all of your medical concerns. We strive to provide excellent quality care in the Emergency Department.  Anything less than excellent does not meet our expectations.     The exam and treatment you received in the Emergency Department were for an emergent problem and are not intended as complete care. It is important that you follow up with a doctor, nurse practitioner, or physician’s assistant for ongoing care. If your symptoms worsen or you do not improve as expected and you are unable to reach your usual health care provider, you should return to the Emergency Department. We are available 24 hours a day.     Take this sheet with you when you go to your follow-up visit.     If you have any problem arranging the follow-up visit, contact the Emergency Department immediately.     Make an appointment your family doctor for follow up of this visit. Return to the ER if you are unable to be seen in a timely manner.     Below is a summary of your results.    Labs  No results found for this or any previous visit (from the past 12 hour(s)).    Radiologic Studies  No orders to display

## 2025-02-19 ASSESSMENT — VISUAL ACUITY: OU: 1

## (undated) DEVICE — 1200 GUARD II KIT W/5MM TUBE W/O VAC TUBE: Brand: GUARDIAN

## (undated) DEVICE — Z DISCONTINUED PACK PROCEDURE SURG C SECT KT SMH

## (undated) DEVICE — SUT CHRMC 2-0 27IN CT1 BRN --

## (undated) DEVICE — COVERALLS PROTCT 2XL WHT SMS ANTISTATIC PREM KNIT CUF FULL

## (undated) DEVICE — BAKRI POSTPARTUM BALLOON WITH RAPID INSTILLATION COMPONENTS: Brand: BAKRI

## (undated) DEVICE — SOLUTION IRRIG 1000ML 0.9% SOD CHL USP POUR PLAS BTL

## (undated) DEVICE — DEVICE ES L3M EDGE COAT HEX LOK BLDE ELECTRD HOLSTER FORC

## (undated) DEVICE — DRESSING SIL W4XL5IN ANTIBACT GELLING FBR CYTOFORM

## (undated) DEVICE — KENDALL SCD EXPRESS SLEEVES, KNEE LENGTH, MEDIUM: Brand: KENDALL SCD

## (undated) DEVICE — 3000CC GUARDIAN II: Brand: GUARDIAN

## (undated) DEVICE — SOLUTION IRRIG 1000ML STRL H2O USP PLAS POUR BTL

## (undated) DEVICE — WOUND RETRACTOR AND PROTECTOR: Brand: ALEXIS WOUND PROTECTOR-RETRACTOR

## (undated) DEVICE — SUTURE MONOCRYL SZ 3-0 L27IN ABSRB UD L60MM KS STR REV CUT Y523H

## (undated) DEVICE — Z DISCONTINUED NO SUB SUTURE PLN GUT SZ 2-0 L27IN ABSRB YELLOWISH TAN L70MM XLH 53T

## (undated) DEVICE — CANISTER, RIGID, 3000CC: Brand: MEDLINE INDUSTRIES, INC.

## (undated) DEVICE — GLOVE SURG SZ 8 L12IN FNGR THK79MIL GRN LTX FREE

## (undated) DEVICE — SUTURE VICRYL SZ 2-0 L36IN ABSRB VLT L36MM CT-1 1/2 CIR J345H

## (undated) DEVICE — Z DISCONTINUED USE 2220179 SUTURE VCRL SZ 0 L36IN ABSRB VLT L40MM CT 1/2 CIR J358H

## (undated) DEVICE — GOWN,SIRUS,NONRNF,SETINSLV,2XL,18/CS: Brand: MEDLINE

## (undated) DEVICE — APPLICATOR MEDICATED 26 CC SOLUTION HI LT ORNG CHLORAPREP

## (undated) DEVICE — ATTACHMENT SMK 3/8INX10FT VALLEYLAB

## (undated) DEVICE — Z DUP USE 2271313 SOLIDIFIER FLUID 3000 CC ABSORB

## (undated) DEVICE — GLOVE ORANGE PI 8   MSG9080